# Patient Record
Sex: MALE | Race: WHITE | Employment: OTHER | URBAN - METROPOLITAN AREA
[De-identification: names, ages, dates, MRNs, and addresses within clinical notes are randomized per-mention and may not be internally consistent; named-entity substitution may affect disease eponyms.]

---

## 2018-01-01 ENCOUNTER — HOSPITAL ENCOUNTER (EMERGENCY)
Age: 73
Discharge: HOME | End: 2018-11-19
Attending: EMERGENCY MEDICINE
Payer: COMMERCIAL

## 2018-01-01 ENCOUNTER — APPOINTMENT (OUTPATIENT)
Dept: GENERAL RADIOLOGY | Age: 73
DRG: 432 | End: 2018-01-01
Attending: EMERGENCY MEDICINE
Payer: COMMERCIAL

## 2018-01-01 ENCOUNTER — APPOINTMENT (OUTPATIENT)
Dept: INTERVENTIONAL RADIOLOGY/VASCULAR | Age: 73
DRG: 432 | End: 2018-01-01
Attending: HOSPITALIST
Payer: COMMERCIAL

## 2018-01-01 ENCOUNTER — APPOINTMENT (OUTPATIENT)
Dept: ULTRASOUND IMAGING | Age: 73
DRG: 432 | End: 2018-01-01
Attending: HOSPITALIST
Payer: COMMERCIAL

## 2018-01-01 ENCOUNTER — HOSPITAL ENCOUNTER (INPATIENT)
Age: 73
LOS: 1 days | Discharge: ANOTHER HOSPITAL | DRG: 432 | End: 2018-12-29
Attending: EMERGENCY MEDICINE | Admitting: HOSPITALIST
Payer: COMMERCIAL

## 2018-01-01 ENCOUNTER — HOSPITAL ENCOUNTER (INPATIENT)
Age: 73
LOS: 3 days | Discharge: HOME | DRG: 432 | End: 2019-01-01
Attending: FAMILY MEDICINE | Admitting: INTERNAL MEDICINE
Payer: COMMERCIAL

## 2018-01-01 ENCOUNTER — APPOINTMENT (OUTPATIENT)
Dept: GENERAL RADIOLOGY | Age: 73
End: 2018-01-01
Attending: EMERGENCY MEDICINE
Payer: COMMERCIAL

## 2018-01-01 ENCOUNTER — HOSPITAL ENCOUNTER (OUTPATIENT)
Dept: LAB | Age: 73
Discharge: HOME | End: 2018-12-04
Payer: SUBSIDIZED

## 2018-01-01 VITALS
DIASTOLIC BLOOD PRESSURE: 55 MMHG | HEART RATE: 87 BPM | BODY MASS INDEX: 33.47 KG/M2 | SYSTOLIC BLOOD PRESSURE: 97 MMHG | TEMPERATURE: 98.1 F | RESPIRATION RATE: 18 BRPM | HEIGHT: 69 IN | WEIGHT: 226 LBS | OXYGEN SATURATION: 93 %

## 2018-01-01 VITALS
BODY MASS INDEX: 29.62 KG/M2 | DIASTOLIC BLOOD PRESSURE: 74 MMHG | WEIGHT: 200 LBS | TEMPERATURE: 99.1 F | OXYGEN SATURATION: 98 % | RESPIRATION RATE: 16 BRPM | HEART RATE: 62 BPM | SYSTOLIC BLOOD PRESSURE: 121 MMHG | HEIGHT: 69 IN

## 2018-01-01 DIAGNOSIS — K70.11 ASCITES DUE TO ALCOHOLIC HEPATITIS: ICD-10-CM

## 2018-01-01 DIAGNOSIS — R05.9 COUGH: ICD-10-CM

## 2018-01-01 DIAGNOSIS — R07.9 CHEST PAIN, UNSPECIFIED TYPE: Primary | ICD-10-CM

## 2018-01-01 DIAGNOSIS — R07.2 PRECORDIAL PAIN: ICD-10-CM

## 2018-01-01 DIAGNOSIS — E87.1 HYPONATREMIA: Primary | ICD-10-CM

## 2018-01-01 DIAGNOSIS — R01.1 MURMUR, CARDIAC: ICD-10-CM

## 2018-01-01 DIAGNOSIS — J18.9 PNEUMONIA OF LEFT LOWER LOBE DUE TO INFECTIOUS ORGANISM: ICD-10-CM

## 2018-01-01 DIAGNOSIS — K74.69 OTHER CIRRHOSIS OF LIVER (HCC): ICD-10-CM

## 2018-01-01 LAB
ALBUMIN SERPL-MCNC: 2.8 G/DL (ref 3.2–4.6)
ALBUMIN SERPL-MCNC: 2.8 G/DL (ref 3.2–4.6)
ALBUMIN SERPL-MCNC: 2.9 G/DL (ref 3.2–4.6)
ALBUMIN SERPL-MCNC: 3 G/DL (ref 3.2–4.6)
ALBUMIN SERPL-MCNC: 3.3 G/DL (ref 3.2–4.6)
ALBUMIN/GLOB SERPL: 0.6 {RATIO}
ALBUMIN/GLOB SERPL: 0.7 {RATIO}
ALBUMIN/GLOB SERPL: 0.8 {RATIO}
ALBUMIN/GLOB SERPL: 0.8 {RATIO}
ALBUMIN/GLOB SERPL: 0.9 {RATIO}
ALBUMIN/GLOB SERPL: 0.9 {RATIO} (ref 1.2–3.5)
ALBUMIN/GLOB SERPL: 1 {RATIO} (ref 1.2–3.5)
ALP SERPL-CCNC: 111 U/L (ref 50–136)
ALP SERPL-CCNC: 123 U/L (ref 50–136)
ALP SERPL-CCNC: 125 U/L (ref 50–136)
ALP SERPL-CCNC: 172 U/L (ref 50–136)
ALP SERPL-CCNC: 97 U/L (ref 50–136)
ALP SERPL-CCNC: 99 U/L (ref 50–136)
ALP SERPL-CCNC: 99 U/L (ref 50–136)
ALT SERPL-CCNC: 28 U/L (ref 12–65)
ALT SERPL-CCNC: 30 U/L (ref 12–65)
ALT SERPL-CCNC: 36 U/L (ref 12–65)
ALT SERPL-CCNC: 36 U/L (ref 12–65)
ALT SERPL-CCNC: 38 U/L (ref 12–65)
AMMONIA PLAS-SCNC: 17 UMOL/L (ref 24.9–68)
ANION GAP SERPL CALC-SCNC: 11 MMOL/L
ANION GAP SERPL CALC-SCNC: 11 MMOL/L
ANION GAP SERPL CALC-SCNC: 13 MMOL/L
ANION GAP SERPL CALC-SCNC: 7 MMOL/L (ref 7–16)
ANION GAP SERPL CALC-SCNC: 7 MMOL/L (ref 7–16)
ANION GAP SERPL CALC-SCNC: 8 MMOL/L
ANION GAP SERPL CALC-SCNC: 8 MMOL/L (ref 7–16)
ANION GAP SERPL CALC-SCNC: 9 MMOL/L
AST SERPL-CCNC: 31 U/L (ref 15–37)
AST SERPL-CCNC: 34 U/L (ref 15–37)
AST SERPL-CCNC: 34 U/L (ref 15–37)
AST SERPL-CCNC: 37 U/L (ref 15–37)
AST SERPL-CCNC: 48 U/L (ref 15–37)
AST SERPL-CCNC: 49 U/L (ref 15–37)
AST SERPL-CCNC: 50 U/L (ref 15–37)
ATRIAL RATE: 58 BPM
ATRIAL RATE: 72 BPM
BASOPHILS # BLD: 0 K/UL (ref 0–0.2)
BASOPHILS # BLD: 0 K/UL (ref 0–0.2)
BASOPHILS NFR BLD: 0 % (ref 0–2)
BASOPHILS NFR BLD: 0 % (ref 0–2)
BILIRUB SERPL-MCNC: 0.6 MG/DL (ref 0.2–1.1)
BILIRUB SERPL-MCNC: 0.7 MG/DL (ref 0.2–1.1)
BILIRUB SERPL-MCNC: 0.8 MG/DL (ref 0.2–1.1)
BILIRUB SERPL-MCNC: 0.9 MG/DL (ref 0.2–1.1)
BILIRUB SERPL-MCNC: 1 MG/DL (ref 0.2–1.1)
BILIRUB SERPL-MCNC: 1.2 MG/DL (ref 0.2–1.1)
BILIRUB SERPL-MCNC: 1.3 MG/DL (ref 0.2–1.1)
BNP SERPL-MCNC: 72 PG/ML
BUN SERPL-MCNC: 11 MG/DL (ref 8–23)
BUN SERPL-MCNC: 12 MG/DL (ref 8–23)
BUN SERPL-MCNC: 13 MG/DL (ref 8–23)
BUN SERPL-MCNC: 14 MG/DL (ref 8–23)
BUN SERPL-MCNC: 21 MG/DL (ref 8–23)
CALCIUM SERPL-MCNC: 7.8 MG/DL (ref 8.3–10.4)
CALCIUM SERPL-MCNC: 7.8 MG/DL (ref 8.3–10.4)
CALCIUM SERPL-MCNC: 8 MG/DL (ref 8.3–10.4)
CALCIUM SERPL-MCNC: 8.2 MG/DL (ref 8.3–10.4)
CALCIUM SERPL-MCNC: 8.5 MG/DL (ref 8.3–10.4)
CALCULATED P AXIS, ECG09: 10 DEGREES
CALCULATED P AXIS, ECG09: 70 DEGREES
CALCULATED R AXIS, ECG10: 31 DEGREES
CALCULATED R AXIS, ECG10: 9 DEGREES
CALCULATED T AXIS, ECG11: 117 DEGREES
CALCULATED T AXIS, ECG11: 28 DEGREES
CHLORIDE SERPL-SCNC: 83 MMOL/L (ref 98–107)
CHLORIDE SERPL-SCNC: 86 MMOL/L (ref 98–107)
CHLORIDE SERPL-SCNC: 87 MMOL/L (ref 98–107)
CHLORIDE SERPL-SCNC: 88 MMOL/L (ref 98–107)
CHLORIDE SERPL-SCNC: 89 MMOL/L (ref 98–107)
CHLORIDE SERPL-SCNC: 89 MMOL/L (ref 98–107)
CHLORIDE SERPL-SCNC: 92 MMOL/L (ref 98–107)
CHLORIDE SERPL-SCNC: 95 MMOL/L (ref 98–107)
CHLORIDE UR-SCNC: 86 MMOL/L
CHOLEST SERPL-MCNC: 160 MG/DL
CO2 SERPL-SCNC: 21 MMOL/L (ref 21–32)
CO2 SERPL-SCNC: 21 MMOL/L (ref 21–32)
CO2 SERPL-SCNC: 23 MMOL/L (ref 21–32)
CO2 SERPL-SCNC: 24 MMOL/L (ref 21–32)
CO2 SERPL-SCNC: 25 MMOL/L (ref 21–32)
CO2 SERPL-SCNC: 25 MMOL/L (ref 21–32)
CO2 SERPL-SCNC: 27 MMOL/L (ref 21–32)
CO2 SERPL-SCNC: 27 MMOL/L (ref 21–32)
CREAT SERPL-MCNC: 0.83 MG/DL (ref 0.8–1.5)
CREAT SERPL-MCNC: 0.87 MG/DL (ref 0.8–1.5)
CREAT SERPL-MCNC: 0.95 MG/DL (ref 0.8–1.5)
CREAT SERPL-MCNC: 0.97 MG/DL (ref 0.8–1.5)
CREAT SERPL-MCNC: 1.04 MG/DL (ref 0.8–1.5)
CREAT SERPL-MCNC: 1.08 MG/DL (ref 0.8–1.5)
CREAT SERPL-MCNC: 1.18 MG/DL (ref 0.8–1.5)
CREAT SERPL-MCNC: 1.35 MG/DL (ref 0.8–1.5)
CREAT UR-MCNC: 20 MG/DL
DIAGNOSIS, 93000: NORMAL
DIAGNOSIS, 93000: NORMAL
DIFFERENTIAL METHOD BLD: ABNORMAL
DIFFERENTIAL METHOD BLD: ABNORMAL
EOSINOPHIL # BLD: 0.1 K/UL (ref 0–0.8)
EOSINOPHIL # BLD: 0.1 K/UL (ref 0–0.8)
EOSINOPHIL #/AREA URNS HPF: NEGATIVE /[HPF]
EOSINOPHIL NFR BLD: 1 % (ref 0.5–7.8)
EOSINOPHIL NFR BLD: 2 % (ref 0.5–7.8)
ERYTHROCYTE [DISTWIDTH] IN BLOOD BY AUTOMATED COUNT: 19 % (ref 11.9–14.6)
ERYTHROCYTE [DISTWIDTH] IN BLOOD BY AUTOMATED COUNT: 19 % (ref 11.9–14.6)
ERYTHROCYTE [DISTWIDTH] IN BLOOD BY AUTOMATED COUNT: 19.2 % (ref 11.9–14.6)
ERYTHROCYTE [DISTWIDTH] IN BLOOD BY AUTOMATED COUNT: 20.1 %
GLOBULIN SER CALC-MCNC: 3.2 G/DL (ref 2.3–3.5)
GLOBULIN SER CALC-MCNC: 3.3 G/DL (ref 2.3–3.5)
GLOBULIN SER CALC-MCNC: 3.3 G/DL (ref 2.3–3.5)
GLOBULIN SER CALC-MCNC: 3.4 G/DL (ref 2.3–3.5)
GLOBULIN SER CALC-MCNC: 3.8 G/DL (ref 2.3–3.5)
GLOBULIN SER CALC-MCNC: 4.1 G/DL (ref 2.3–3.5)
GLOBULIN SER CALC-MCNC: 4.5 G/DL (ref 2.3–3.5)
GLUCOSE SERPL-MCNC: 121 MG/DL (ref 65–100)
GLUCOSE SERPL-MCNC: 131 MG/DL (ref 65–100)
GLUCOSE SERPL-MCNC: 133 MG/DL (ref 65–100)
GLUCOSE SERPL-MCNC: 133 MG/DL (ref 65–100)
GLUCOSE SERPL-MCNC: 139 MG/DL (ref 65–100)
GLUCOSE SERPL-MCNC: 173 MG/DL (ref 65–100)
GLUCOSE SERPL-MCNC: 98 MG/DL (ref 65–100)
GLUCOSE SERPL-MCNC: 99 MG/DL (ref 65–100)
HCT VFR BLD AUTO: 26 % (ref 41.1–50.3)
HCT VFR BLD AUTO: 26.4 % (ref 41.1–50.3)
HCT VFR BLD AUTO: 31.7 % (ref 41.1–50.3)
HCT VFR BLD AUTO: 32.7 % (ref 41.1–50.3)
HDLC SERPL-MCNC: 71 MG/DL (ref 40–60)
HDLC SERPL: 2.3 {RATIO}
HGB BLD-MCNC: 10.6 G/DL (ref 13.6–17.2)
HGB BLD-MCNC: 10.8 G/DL (ref 13.6–17.2)
HGB BLD-MCNC: 9.1 G/DL (ref 13.6–17.2)
HGB BLD-MCNC: 9.2 G/DL (ref 13.6–17.2)
IMM GRANULOCYTES # BLD: 0 K/UL (ref 0–0.5)
IMM GRANULOCYTES # BLD: 0.1 K/UL (ref 0–0.5)
IMM GRANULOCYTES NFR BLD AUTO: 1 % (ref 0–5)
IMM GRANULOCYTES NFR BLD AUTO: 1 % (ref 0–5)
INR PPP: 1.4
LDLC SERPL CALC-MCNC: 83.8 MG/DL
LIPID PROFILE,FLP: ABNORMAL
LYMPHOCYTES # BLD: 0.6 K/UL (ref 0.5–4.6)
LYMPHOCYTES # BLD: 1 K/UL (ref 0.5–4.6)
LYMPHOCYTES NFR BLD: 10 % (ref 13–44)
LYMPHOCYTES NFR BLD: 20 % (ref 13–44)
MCH RBC QN AUTO: 26.2 PG (ref 26.1–32.9)
MCH RBC QN AUTO: 28.6 PG (ref 26.1–32.9)
MCH RBC QN AUTO: 28.7 PG (ref 26.1–32.9)
MCH RBC QN AUTO: 29.8 PG (ref 26.1–32.9)
MCHC RBC AUTO-ENTMCNC: 32.4 G/DL (ref 31.4–35)
MCHC RBC AUTO-ENTMCNC: 34.1 G/DL (ref 31.4–35)
MCHC RBC AUTO-ENTMCNC: 34.8 G/DL (ref 31.4–35)
MCHC RBC AUTO-ENTMCNC: 35 G/DL (ref 31.4–35)
MCV RBC AUTO: 80.7 FL (ref 79.6–97.8)
MCV RBC AUTO: 82.2 FL (ref 79.6–97.8)
MCV RBC AUTO: 84.1 FL (ref 79.6–97.8)
MCV RBC AUTO: 85.2 FL (ref 79.6–97.8)
MONOCYTES # BLD: 0.9 K/UL (ref 0.1–1.3)
MONOCYTES # BLD: 1 K/UL (ref 0.1–1.3)
MONOCYTES NFR BLD: 16 % (ref 4–12)
MONOCYTES NFR BLD: 18 % (ref 4–12)
NEUTS SEG # BLD: 3 K/UL (ref 1.7–8.2)
NEUTS SEG # BLD: 4.2 K/UL (ref 1.7–8.2)
NEUTS SEG NFR BLD: 60 % (ref 43–78)
NEUTS SEG NFR BLD: 72 % (ref 43–78)
NRBC # BLD: 0 K/UL (ref 0–0.2)
OSMOLALITY UR: 238 MOSM/KG H2O
P-R INTERVAL, ECG05: 208 MS
P-R INTERVAL, ECG05: 210 MS
PLATELET # BLD AUTO: 118 K/UL (ref 150–450)
PLATELET # BLD AUTO: 119 K/UL (ref 150–450)
PLATELET # BLD AUTO: 121 K/UL (ref 150–450)
PLATELET # BLD AUTO: 144 K/UL (ref 150–450)
PMV BLD AUTO: 10.6 FL (ref 9.4–12.3)
PMV BLD AUTO: 11.1 FL (ref 9.4–12.3)
PMV BLD AUTO: 11.7 FL (ref 9.4–12.3)
PMV BLD AUTO: 12 FL (ref 9.4–12.3)
POTASSIUM SERPL-SCNC: 3.5 MMOL/L (ref 3.5–5.1)
POTASSIUM SERPL-SCNC: 3.8 MMOL/L (ref 3.5–5.1)
POTASSIUM SERPL-SCNC: 3.8 MMOL/L (ref 3.5–5.1)
POTASSIUM SERPL-SCNC: 4.1 MMOL/L (ref 3.5–5.1)
POTASSIUM SERPL-SCNC: 4.1 MMOL/L (ref 3.5–5.1)
POTASSIUM SERPL-SCNC: 4.4 MMOL/L (ref 3.5–5.1)
POTASSIUM SERPL-SCNC: 4.8 MMOL/L (ref 3.5–5.1)
POTASSIUM SERPL-SCNC: 4.9 MMOL/L (ref 3.5–5.1)
POTASSIUM UR-SCNC: 11 MMOL/L
PROCALCITONIN SERPL-MCNC: 0.1 NG/ML
PROT SERPL-MCNC: 6.2 G/DL
PROT SERPL-MCNC: 6.2 G/DL (ref 6.3–8.2)
PROT SERPL-MCNC: 6.3 G/DL
PROT SERPL-MCNC: 6.6 G/DL (ref 6.3–8.2)
PROT SERPL-MCNC: 6.8 G/DL
PROT SERPL-MCNC: 7 G/DL
PROT SERPL-MCNC: 7.3 G/DL
PROTHROMBIN TIME: 16.5 SEC (ref 11.7–14.5)
Q-T INTERVAL, ECG07: 366 MS
Q-T INTERVAL, ECG07: 418 MS
QRS DURATION, ECG06: 82 MS
QRS DURATION, ECG06: 84 MS
QTC CALCULATION (BEZET), ECG08: 400 MS
QTC CALCULATION (BEZET), ECG08: 410 MS
RBC # BLD AUTO: 3.05 M/UL (ref 4.23–5.6)
RBC # BLD AUTO: 3.21 M/UL (ref 4.23–5.6)
RBC # BLD AUTO: 3.77 M/UL (ref 4.23–5.6)
RBC # BLD AUTO: 4.05 M/UL (ref 4.23–5.6)
SODIUM SERPL-SCNC: 116 MMOL/L (ref 136–145)
SODIUM SERPL-SCNC: 118 MMOL/L (ref 136–145)
SODIUM SERPL-SCNC: 121 MMOL/L (ref 136–145)
SODIUM SERPL-SCNC: 122 MMOL/L (ref 136–145)
SODIUM SERPL-SCNC: 122 MMOL/L (ref 136–145)
SODIUM SERPL-SCNC: 126 MMOL/L (ref 136–145)
SODIUM SERPL-SCNC: 127 MMOL/L (ref 136–145)
SODIUM SERPL-SCNC: 131 MMOL/L (ref 136–145)
SODIUM UR-SCNC: 78 MMOL/L
TRIGL SERPL-MCNC: 26 MG/DL (ref 35–150)
TROPONIN I BLD-MCNC: 0 NG/ML (ref 0.02–0.05)
TROPONIN I BLD-MCNC: 0 NG/ML (ref 0.02–0.05)
TROPONIN I BLD-MCNC: 0.26 NG/ML (ref 0.02–0.05)
TROPONIN I SERPL-MCNC: <0.02 NG/ML (ref 0.02–0.05)
TROPONIN I SERPL-MCNC: <0.02 NG/ML (ref 0.02–0.05)
VENTRICULAR RATE, ECG03: 58 BPM
VENTRICULAR RATE, ECG03: 72 BPM
VLDLC SERPL CALC-MCNC: 5.2 MG/DL (ref 6–23)
WBC # BLD AUTO: 4.8 K/UL (ref 4.3–11.1)
WBC # BLD AUTO: 4.9 K/UL (ref 4.3–11.1)
WBC # BLD AUTO: 5 K/UL (ref 4.3–11.1)
WBC # BLD AUTO: 5.8 K/UL (ref 4.3–11.1)

## 2018-01-01 PROCEDURE — 85025 COMPLETE CBC W/AUTO DIFF WBC: CPT

## 2018-01-01 PROCEDURE — 97165 OT EVAL LOW COMPLEX 30 MIN: CPT

## 2018-01-01 PROCEDURE — 85027 COMPLETE CBC AUTOMATED: CPT

## 2018-01-01 PROCEDURE — 94640 AIRWAY INHALATION TREATMENT: CPT

## 2018-01-01 PROCEDURE — 97161 PT EVAL LOW COMPLEX 20 MIN: CPT

## 2018-01-01 PROCEDURE — 77030011256 HC DRSG MEPILEX <16IN NO BORD MOLN -A

## 2018-01-01 PROCEDURE — 74011000258 HC RX REV CODE- 258: Performed by: INTERNAL MEDICINE

## 2018-01-01 PROCEDURE — 82570 ASSAY OF URINE CREATININE: CPT

## 2018-01-01 PROCEDURE — 80053 COMPREHEN METABOLIC PANEL: CPT

## 2018-01-01 PROCEDURE — 71045 X-RAY EXAM CHEST 1 VIEW: CPT

## 2018-01-01 PROCEDURE — 87040 BLOOD CULTURE FOR BACTERIA: CPT

## 2018-01-01 PROCEDURE — 74011250637 HC RX REV CODE- 250/637: Performed by: HOSPITALIST

## 2018-01-01 PROCEDURE — 49083 ABD PARACENTESIS W/IMAGING: CPT

## 2018-01-01 PROCEDURE — 74011000250 HC RX REV CODE- 250: Performed by: HOSPITALIST

## 2018-01-01 PROCEDURE — 97530 THERAPEUTIC ACTIVITIES: CPT

## 2018-01-01 PROCEDURE — 85610 PROTHROMBIN TIME: CPT

## 2018-01-01 PROCEDURE — 74011000258 HC RX REV CODE- 258: Performed by: HOSPITALIST

## 2018-01-01 PROCEDURE — 74011250637 HC RX REV CODE- 250/637: Performed by: INTERNAL MEDICINE

## 2018-01-01 PROCEDURE — 77030020256 HC SOL INJ NACL 0.9%  500ML

## 2018-01-01 PROCEDURE — 82436 ASSAY OF URINE CHLORIDE: CPT

## 2018-01-01 PROCEDURE — 71046 X-RAY EXAM CHEST 2 VIEWS: CPT

## 2018-01-01 PROCEDURE — 65270000029 HC RM PRIVATE

## 2018-01-01 PROCEDURE — 76700 US EXAM ABDOM COMPLETE: CPT

## 2018-01-01 PROCEDURE — 77030037400 HC ADH TISS HI VISC EXOFIN CHMP -B

## 2018-01-01 PROCEDURE — 0W9G3ZZ DRAINAGE OF PERITONEAL CAVITY, PERCUTANEOUS APPROACH: ICD-10-PCS | Performed by: RADIOLOGY

## 2018-01-01 PROCEDURE — 74011250636 HC RX REV CODE- 250/636: Performed by: INTERNAL MEDICINE

## 2018-01-01 PROCEDURE — 77030012893

## 2018-01-01 PROCEDURE — P9047 ALBUMIN (HUMAN), 25%, 50ML: HCPCS | Performed by: PHYSICIAN ASSISTANT

## 2018-01-01 PROCEDURE — 83880 ASSAY OF NATRIURETIC PEPTIDE: CPT

## 2018-01-01 PROCEDURE — 74011250636 HC RX REV CODE- 250/636: Performed by: EMERGENCY MEDICINE

## 2018-01-01 PROCEDURE — 93005 ELECTROCARDIOGRAM TRACING: CPT | Performed by: EMERGENCY MEDICINE

## 2018-01-01 PROCEDURE — 36415 COLL VENOUS BLD VENIPUNCTURE: CPT

## 2018-01-01 PROCEDURE — 80048 BASIC METABOLIC PNL TOTAL CA: CPT

## 2018-01-01 PROCEDURE — 74011250637 HC RX REV CODE- 250/637: Performed by: PHYSICIAN ASSISTANT

## 2018-01-01 PROCEDURE — 87205 SMEAR GRAM STAIN: CPT

## 2018-01-01 PROCEDURE — 84484 ASSAY OF TROPONIN QUANT: CPT

## 2018-01-01 PROCEDURE — 74011250636 HC RX REV CODE- 250/636: Performed by: HOSPITALIST

## 2018-01-01 PROCEDURE — 82140 ASSAY OF AMMONIA: CPT

## 2018-01-01 PROCEDURE — C1729 CATH, DRAINAGE: HCPCS

## 2018-01-01 PROCEDURE — 83935 ASSAY OF URINE OSMOLALITY: CPT

## 2018-01-01 PROCEDURE — 94760 N-INVAS EAR/PLS OXIMETRY 1: CPT

## 2018-01-01 PROCEDURE — 99284 EMERGENCY DEPT VISIT MOD MDM: CPT | Performed by: EMERGENCY MEDICINE

## 2018-01-01 PROCEDURE — 74011250636 HC RX REV CODE- 250/636: Performed by: PHYSICIAN ASSISTANT

## 2018-01-01 PROCEDURE — P9047 ALBUMIN (HUMAN), 25%, 50ML: HCPCS | Performed by: HOSPITALIST

## 2018-01-01 PROCEDURE — 84295 ASSAY OF SERUM SODIUM: CPT

## 2018-01-01 PROCEDURE — 97164 PT RE-EVAL EST PLAN CARE: CPT

## 2018-01-01 PROCEDURE — 96374 THER/PROPH/DIAG INJ IV PUSH: CPT | Performed by: EMERGENCY MEDICINE

## 2018-01-01 PROCEDURE — 84133 ASSAY OF URINE POTASSIUM: CPT

## 2018-01-01 PROCEDURE — 74011000258 HC RX REV CODE- 258: Performed by: EMERGENCY MEDICINE

## 2018-01-01 PROCEDURE — 74011000302 HC RX REV CODE- 302: Performed by: HOSPITALIST

## 2018-01-01 PROCEDURE — 99285 EMERGENCY DEPT VISIT HI MDM: CPT | Performed by: EMERGENCY MEDICINE

## 2018-01-01 PROCEDURE — 97535 SELF CARE MNGMENT TRAINING: CPT

## 2018-01-01 PROCEDURE — 84300 ASSAY OF URINE SODIUM: CPT

## 2018-01-01 PROCEDURE — 86580 TB INTRADERMAL TEST: CPT | Performed by: HOSPITALIST

## 2018-01-01 PROCEDURE — 80061 LIPID PANEL: CPT

## 2018-01-01 PROCEDURE — 77030032490 HC SLV COMPR SCD KNE COVD -B

## 2018-01-01 PROCEDURE — 84145 PROCALCITONIN (PCT): CPT

## 2018-01-01 PROCEDURE — 77030034849

## 2018-01-01 RX ORDER — SODIUM CHLORIDE TAB 1 GM 1 G
1 TAB MISCELLANEOUS 4 TIMES DAILY
Status: DISCONTINUED | OUTPATIENT
Start: 2018-01-01 | End: 2018-01-01 | Stop reason: HOSPADM

## 2018-01-01 RX ORDER — ACETAMINOPHEN 325 MG/1
650 TABLET ORAL
Status: CANCELLED | OUTPATIENT
Start: 2018-01-01

## 2018-01-01 RX ORDER — SODIUM CHLORIDE 0.9 % (FLUSH) 0.9 %
5-10 SYRINGE (ML) INJECTION AS NEEDED
Status: DISCONTINUED | OUTPATIENT
Start: 2018-01-01 | End: 2018-01-01 | Stop reason: HOSPADM

## 2018-01-01 RX ORDER — NALOXONE HYDROCHLORIDE 0.4 MG/ML
0.4 INJECTION, SOLUTION INTRAMUSCULAR; INTRAVENOUS; SUBCUTANEOUS AS NEEDED
Status: DISCONTINUED | OUTPATIENT
Start: 2018-01-01 | End: 2019-01-01 | Stop reason: HOSPADM

## 2018-01-01 RX ORDER — SPIRONOLACTONE 25 MG/1
100 TABLET ORAL DAILY
Status: DISCONTINUED | OUTPATIENT
Start: 2018-01-01 | End: 2018-01-01 | Stop reason: HOSPADM

## 2018-01-01 RX ORDER — SODIUM CHLORIDE 0.9 % (FLUSH) 0.9 %
5-10 SYRINGE (ML) INJECTION AS NEEDED
Status: DISCONTINUED | OUTPATIENT
Start: 2018-01-01 | End: 2019-01-01 | Stop reason: HOSPADM

## 2018-01-01 RX ORDER — MORPHINE SULFATE 2 MG/ML
1 INJECTION, SOLUTION INTRAMUSCULAR; INTRAVENOUS
Status: CANCELLED | OUTPATIENT
Start: 2018-01-01

## 2018-01-01 RX ORDER — SPIRONOLACTONE 100 MG/1
200 TABLET, FILM COATED ORAL DAILY
Status: DISCONTINUED | OUTPATIENT
Start: 2018-01-01 | End: 2019-01-01 | Stop reason: HOSPADM

## 2018-01-01 RX ORDER — PHENAZOPYRIDINE HYDROCHLORIDE 95 MG/1
95 TABLET ORAL 3 TIMES DAILY
Status: DISCONTINUED | OUTPATIENT
Start: 2018-01-01 | End: 2018-01-01 | Stop reason: HOSPADM

## 2018-01-01 RX ORDER — LOSARTAN POTASSIUM 50 MG/1
50 TABLET ORAL DAILY
Status: CANCELLED | OUTPATIENT
Start: 2018-01-01

## 2018-01-01 RX ORDER — SODIUM CHLORIDE 0.9 % (FLUSH) 0.9 %
5-10 SYRINGE (ML) INJECTION EVERY 8 HOURS
Status: DISCONTINUED | OUTPATIENT
Start: 2018-01-01 | End: 2018-01-01 | Stop reason: HOSPADM

## 2018-01-01 RX ORDER — DOXAZOSIN 4 MG/1
4 TABLET ORAL DAILY
Status: DISCONTINUED | OUTPATIENT
Start: 2018-01-01 | End: 2018-01-01 | Stop reason: HOSPADM

## 2018-01-01 RX ORDER — SODIUM CHLORIDE 0.9 % (FLUSH) 0.9 %
5-10 SYRINGE (ML) INJECTION AS NEEDED
Status: CANCELLED | OUTPATIENT
Start: 2018-01-01

## 2018-01-01 RX ORDER — FUROSEMIDE 10 MG/ML
40 INJECTION INTRAMUSCULAR; INTRAVENOUS 2 TIMES DAILY
Status: DISCONTINUED | OUTPATIENT
Start: 2018-01-01 | End: 2018-01-01

## 2018-01-01 RX ORDER — SODIUM CHLORIDE TAB 1 GM 1 G
1 TAB MISCELLANEOUS 4 TIMES DAILY
Status: DISCONTINUED | OUTPATIENT
Start: 2018-01-01 | End: 2018-01-01

## 2018-01-01 RX ORDER — PANTOPRAZOLE SODIUM 40 MG/1
40 TABLET, DELAYED RELEASE ORAL EVERY 24 HOURS
Status: DISCONTINUED | OUTPATIENT
Start: 2018-01-01 | End: 2019-01-01 | Stop reason: HOSPADM

## 2018-01-01 RX ORDER — ONDANSETRON 2 MG/ML
4 INJECTION INTRAMUSCULAR; INTRAVENOUS
Status: DISCONTINUED | OUTPATIENT
Start: 2018-01-01 | End: 2018-01-01 | Stop reason: HOSPADM

## 2018-01-01 RX ORDER — ENOXAPARIN SODIUM 100 MG/ML
40 INJECTION SUBCUTANEOUS EVERY 24 HOURS
Status: DISCONTINUED | OUTPATIENT
Start: 2018-01-01 | End: 2019-01-01 | Stop reason: HOSPADM

## 2018-01-01 RX ORDER — ACETAMINOPHEN 325 MG/1
650 TABLET ORAL
Status: DISCONTINUED | OUTPATIENT
Start: 2018-01-01 | End: 2018-01-01 | Stop reason: HOSPADM

## 2018-01-01 RX ORDER — ALBUMIN HUMAN 250 G/1000ML
12.5 SOLUTION INTRAVENOUS EVERY 6 HOURS
Status: DISCONTINUED | OUTPATIENT
Start: 2018-01-01 | End: 2018-01-01 | Stop reason: HOSPADM

## 2018-01-01 RX ORDER — GUAIFENESIN/DEXTROMETHORPHAN 100-10MG/5
5 SYRUP ORAL
Status: CANCELLED | OUTPATIENT
Start: 2018-01-01

## 2018-01-01 RX ORDER — ALBUMIN HUMAN 250 G/1000ML
25 SOLUTION INTRAVENOUS ONCE
Status: COMPLETED | OUTPATIENT
Start: 2018-01-01 | End: 2018-01-01

## 2018-01-01 RX ORDER — GABAPENTIN 400 MG/1
400 CAPSULE ORAL 3 TIMES DAILY
Status: DISCONTINUED | OUTPATIENT
Start: 2018-01-01 | End: 2019-01-01 | Stop reason: HOSPADM

## 2018-01-01 RX ORDER — PANTOPRAZOLE SODIUM 40 MG/1
40 TABLET, DELAYED RELEASE ORAL EVERY 24 HOURS
Status: CANCELLED | OUTPATIENT
Start: 2018-01-01

## 2018-01-01 RX ORDER — NALOXONE HYDROCHLORIDE 0.4 MG/ML
0.4 INJECTION, SOLUTION INTRAMUSCULAR; INTRAVENOUS; SUBCUTANEOUS AS NEEDED
Status: DISCONTINUED | OUTPATIENT
Start: 2018-01-01 | End: 2018-01-01 | Stop reason: HOSPADM

## 2018-01-01 RX ORDER — DOXAZOSIN 4 MG/1
4 TABLET ORAL DAILY
Status: DISCONTINUED | OUTPATIENT
Start: 2018-01-01 | End: 2018-01-01

## 2018-01-01 RX ORDER — FUROSEMIDE 10 MG/ML
40 INJECTION INTRAMUSCULAR; INTRAVENOUS DAILY
Status: CANCELLED | OUTPATIENT
Start: 2018-01-01

## 2018-01-01 RX ORDER — LANOLIN ALCOHOL/MO/W.PET/CERES
27 CREAM (GRAM) TOPICAL
COMMUNITY

## 2018-01-01 RX ORDER — ALBUTEROL SULFATE 0.83 MG/ML
2.5 SOLUTION RESPIRATORY (INHALATION)
Status: CANCELLED | OUTPATIENT
Start: 2018-01-01

## 2018-01-01 RX ORDER — SODIUM CHLORIDE 0.9 % (FLUSH) 0.9 %
5-10 SYRINGE (ML) INJECTION EVERY 8 HOURS
Status: DISCONTINUED | OUTPATIENT
Start: 2018-01-01 | End: 2019-01-01 | Stop reason: HOSPADM

## 2018-01-01 RX ORDER — PHENAZOPYRIDINE HYDROCHLORIDE 95 MG/1
95 TABLET ORAL 3 TIMES DAILY
Status: DISCONTINUED | OUTPATIENT
Start: 2018-01-01 | End: 2019-01-01 | Stop reason: HOSPADM

## 2018-01-01 RX ORDER — SODIUM CHLORIDE TAB 1 GM 1 G
1 TAB MISCELLANEOUS 4 TIMES DAILY
Status: CANCELLED | OUTPATIENT
Start: 2018-01-01

## 2018-01-01 RX ORDER — POTASSIUM CHLORIDE 20 MEQ/1
40 TABLET, EXTENDED RELEASE ORAL DAILY
Status: DISCONTINUED | OUTPATIENT
Start: 2018-01-01 | End: 2019-01-01 | Stop reason: HOSPADM

## 2018-01-01 RX ORDER — FUROSEMIDE 10 MG/ML
40 INJECTION INTRAMUSCULAR; INTRAVENOUS DAILY
Status: DISCONTINUED | OUTPATIENT
Start: 2018-01-01 | End: 2018-01-01

## 2018-01-01 RX ORDER — LIDOCAINE HYDROCHLORIDE 10 MG/ML
1-20 INJECTION INFILTRATION; PERINEURAL ONCE
Status: ACTIVE | OUTPATIENT
Start: 2018-01-01 | End: 2018-01-01

## 2018-01-01 RX ORDER — PHENAZOPYRIDINE HYDROCHLORIDE 95 MG/1
95 TABLET ORAL 3 TIMES DAILY
Status: CANCELLED | OUTPATIENT
Start: 2018-01-01

## 2018-01-01 RX ORDER — ONDANSETRON 2 MG/ML
4 INJECTION INTRAMUSCULAR; INTRAVENOUS
Status: CANCELLED | OUTPATIENT
Start: 2018-01-01

## 2018-01-01 RX ORDER — MORPHINE SULFATE 2 MG/ML
1 INJECTION, SOLUTION INTRAMUSCULAR; INTRAVENOUS
Status: DISCONTINUED | OUTPATIENT
Start: 2018-01-01 | End: 2018-01-01 | Stop reason: HOSPADM

## 2018-01-01 RX ORDER — FUROSEMIDE 10 MG/ML
40 INJECTION INTRAMUSCULAR; INTRAVENOUS DAILY
Status: DISCONTINUED | OUTPATIENT
Start: 2018-01-01 | End: 2018-01-01 | Stop reason: HOSPADM

## 2018-01-01 RX ORDER — LIDOCAINE HYDROCHLORIDE 20 MG/ML
1-10 INJECTION, SOLUTION EPIDURAL; INFILTRATION; INTRACAUDAL; PERINEURAL ONCE
Status: COMPLETED | OUTPATIENT
Start: 2018-01-01 | End: 2018-01-01

## 2018-01-01 RX ORDER — FUROSEMIDE 40 MG/1
40 TABLET ORAL DAILY
Status: CANCELLED | OUTPATIENT
Start: 2018-01-01

## 2018-01-01 RX ORDER — FUROSEMIDE 40 MG/1
40 TABLET ORAL DAILY
Status: DISCONTINUED | OUTPATIENT
Start: 2018-01-01 | End: 2018-01-01

## 2018-01-01 RX ORDER — GABAPENTIN 400 MG/1
400 CAPSULE ORAL 3 TIMES DAILY
Status: CANCELLED | OUTPATIENT
Start: 2018-01-01

## 2018-01-01 RX ORDER — ALBUTEROL SULFATE 0.83 MG/ML
2.5 SOLUTION RESPIRATORY (INHALATION)
Status: DISCONTINUED | OUTPATIENT
Start: 2018-01-01 | End: 2019-01-01 | Stop reason: HOSPADM

## 2018-01-01 RX ORDER — FUROSEMIDE 10 MG/ML
40 INJECTION INTRAMUSCULAR; INTRAVENOUS ONCE
Status: COMPLETED | OUTPATIENT
Start: 2018-01-01 | End: 2018-01-01

## 2018-01-01 RX ORDER — MORPHINE SULFATE 2 MG/ML
1 INJECTION, SOLUTION INTRAMUSCULAR; INTRAVENOUS
Status: DISCONTINUED | OUTPATIENT
Start: 2018-01-01 | End: 2019-01-01 | Stop reason: HOSPADM

## 2018-01-01 RX ORDER — LEVALBUTEROL INHALATION SOLUTION 0.63 MG/3ML
0.63 SOLUTION RESPIRATORY (INHALATION)
Status: DISCONTINUED | OUTPATIENT
Start: 2018-01-01 | End: 2018-01-01 | Stop reason: CLARIF

## 2018-01-01 RX ORDER — DOXAZOSIN 4 MG/1
2 TABLET ORAL DAILY
Status: DISCONTINUED | OUTPATIENT
Start: 2018-01-01 | End: 2019-01-01 | Stop reason: HOSPADM

## 2018-01-01 RX ORDER — ALBUMIN HUMAN 250 G/1000ML
12.5 SOLUTION INTRAVENOUS EVERY 6 HOURS
Status: CANCELLED | OUTPATIENT
Start: 2018-01-01 | End: 2018-01-01

## 2018-01-01 RX ORDER — ALBUMIN HUMAN 250 G/1000ML
12.5 SOLUTION INTRAVENOUS EVERY 6 HOURS
Status: DISCONTINUED | OUTPATIENT
Start: 2018-01-01 | End: 2018-01-01

## 2018-01-01 RX ORDER — FUROSEMIDE 10 MG/ML
40 INJECTION INTRAMUSCULAR; INTRAVENOUS
Status: COMPLETED | OUTPATIENT
Start: 2018-01-01 | End: 2018-01-01

## 2018-01-01 RX ORDER — ACETAMINOPHEN 325 MG/1
650 TABLET ORAL
Status: DISCONTINUED | OUTPATIENT
Start: 2018-01-01 | End: 2019-01-01 | Stop reason: HOSPADM

## 2018-01-01 RX ORDER — NALOXONE HYDROCHLORIDE 0.4 MG/ML
0.4 INJECTION, SOLUTION INTRAMUSCULAR; INTRAVENOUS; SUBCUTANEOUS AS NEEDED
Status: CANCELLED | OUTPATIENT
Start: 2018-01-01

## 2018-01-01 RX ORDER — GUAIFENESIN/DEXTROMETHORPHAN 100-10MG/5
5 SYRUP ORAL
Status: DISCONTINUED | OUTPATIENT
Start: 2018-01-01 | End: 2019-01-01 | Stop reason: HOSPADM

## 2018-01-01 RX ORDER — SODIUM CHLORIDE 0.9 % (FLUSH) 0.9 %
5-10 SYRINGE (ML) INJECTION EVERY 8 HOURS
Status: CANCELLED | OUTPATIENT
Start: 2018-01-01

## 2018-01-01 RX ORDER — SPIRONOLACTONE 25 MG/1
100 TABLET ORAL DAILY
Status: CANCELLED | OUTPATIENT
Start: 2018-01-01

## 2018-01-01 RX ORDER — GUAIFENESIN/DEXTROMETHORPHAN 100-10MG/5
5 SYRUP ORAL
Status: DISCONTINUED | OUTPATIENT
Start: 2018-01-01 | End: 2018-01-01 | Stop reason: HOSPADM

## 2018-01-01 RX ORDER — GABAPENTIN 400 MG/1
400 CAPSULE ORAL 3 TIMES DAILY
Status: DISCONTINUED | OUTPATIENT
Start: 2018-01-01 | End: 2018-01-01 | Stop reason: HOSPADM

## 2018-01-01 RX ORDER — SPIRONOLACTONE 25 MG/1
100 TABLET ORAL DAILY
Status: DISCONTINUED | OUTPATIENT
Start: 2018-01-01 | End: 2018-01-01

## 2018-01-01 RX ORDER — DOXAZOSIN 4 MG/1
4 TABLET ORAL DAILY
Status: CANCELLED | OUTPATIENT
Start: 2018-01-01

## 2018-01-01 RX ORDER — ONDANSETRON 2 MG/ML
4 INJECTION INTRAMUSCULAR; INTRAVENOUS
Status: DISCONTINUED | OUTPATIENT
Start: 2018-01-01 | End: 2019-01-01 | Stop reason: HOSPADM

## 2018-01-01 RX ORDER — ALBUTEROL SULFATE 0.83 MG/ML
2.5 SOLUTION RESPIRATORY (INHALATION)
Status: DISCONTINUED | OUTPATIENT
Start: 2018-01-01 | End: 2018-01-01 | Stop reason: HOSPADM

## 2018-01-01 RX ORDER — PANTOPRAZOLE SODIUM 40 MG/1
40 TABLET, DELAYED RELEASE ORAL EVERY 24 HOURS
Status: DISCONTINUED | OUTPATIENT
Start: 2018-01-01 | End: 2018-01-01 | Stop reason: HOSPADM

## 2018-01-01 RX ADMIN — Medication 10 ML: at 20:46

## 2018-01-01 RX ADMIN — ALBUTEROL SULFATE 2.5 MG: 2.5 SOLUTION RESPIRATORY (INHALATION) at 13:57

## 2018-01-01 RX ADMIN — ALBUTEROL SULFATE 2.5 MG: 2.5 SOLUTION RESPIRATORY (INHALATION) at 14:32

## 2018-01-01 RX ADMIN — LACTULOSE 30 G: 20 SOLUTION ORAL at 18:19

## 2018-01-01 RX ADMIN — FUROSEMIDE 40 MG: 10 INJECTION, SOLUTION INTRAMUSCULAR; INTRAVENOUS at 09:02

## 2018-01-01 RX ADMIN — CEFTRIAXONE SODIUM 2 G: 2 INJECTION, POWDER, FOR SOLUTION INTRAMUSCULAR; INTRAVENOUS at 16:47

## 2018-01-01 RX ADMIN — URINARY PAIN RELIEF 95 MG: 95 TABLET ORAL at 09:49

## 2018-01-01 RX ADMIN — SODIUM CHLORIDE TAB 1 GM 1 G: 1 TAB at 00:46

## 2018-01-01 RX ADMIN — LIDOCAINE HYDROCHLORIDE 8 ML: 20 INJECTION, SOLUTION EPIDURAL; INFILTRATION; INTRACAUDAL; PERINEURAL at 11:32

## 2018-01-01 RX ADMIN — SPIRONOLACTONE 200 MG: 100 TABLET, FILM COATED ORAL at 09:50

## 2018-01-01 RX ADMIN — URINARY PAIN RELIEF 95 MG: 95 TABLET ORAL at 18:37

## 2018-01-01 RX ADMIN — TUBERCULIN PURIFIED PROTEIN DERIVATIVE 5 UNITS: 5 INJECTION, SOLUTION INTRADERMAL at 20:30

## 2018-01-01 RX ADMIN — FUROSEMIDE 10 MG/HR: 10 INJECTION, SOLUTION INTRAMUSCULAR; INTRAVENOUS at 07:55

## 2018-01-01 RX ADMIN — SPIRONOLACTONE 100 MG: 25 TABLET ORAL at 09:48

## 2018-01-01 RX ADMIN — CEFTRIAXONE SODIUM 2 G: 2 INJECTION, POWDER, FOR SOLUTION INTRAMUSCULAR; INTRAVENOUS at 15:38

## 2018-01-01 RX ADMIN — GABAPENTIN 400 MG: 400 CAPSULE ORAL at 21:51

## 2018-01-01 RX ADMIN — VANCOMYCIN HYDROCHLORIDE 2500 MG: 10 INJECTION, POWDER, LYOPHILIZED, FOR SOLUTION INTRAVENOUS at 14:59

## 2018-01-01 RX ADMIN — SODIUM CHLORIDE TAB 1 GM 1 G: 1 TAB at 20:45

## 2018-01-01 RX ADMIN — SODIUM CHLORIDE TAB 1 GM 1 G: 1 TAB at 09:02

## 2018-01-01 RX ADMIN — Medication 10 ML: at 05:22

## 2018-01-01 RX ADMIN — Medication 10 ML: at 06:38

## 2018-01-01 RX ADMIN — CEFTRIAXONE SODIUM 2 G: 2 INJECTION, POWDER, FOR SOLUTION INTRAMUSCULAR; INTRAVENOUS at 18:35

## 2018-01-01 RX ADMIN — ALBUTEROL SULFATE 2.5 MG: 2.5 SOLUTION RESPIRATORY (INHALATION) at 20:46

## 2018-01-01 RX ADMIN — SODIUM CHLORIDE TAB 1 GM 1 G: 1 TAB at 09:01

## 2018-01-01 RX ADMIN — Medication 10 ML: at 16:47

## 2018-01-01 RX ADMIN — DOXAZOSIN 4 MG: 4 TABLET ORAL at 09:02

## 2018-01-01 RX ADMIN — FUROSEMIDE 40 MG: 10 INJECTION, SOLUTION INTRAMUSCULAR; INTRAVENOUS at 20:27

## 2018-01-01 RX ADMIN — ALBUMIN (HUMAN) 12.5 G: 0.25 INJECTION, SOLUTION INTRAVENOUS at 05:53

## 2018-01-01 RX ADMIN — GABAPENTIN 400 MG: 400 CAPSULE ORAL at 18:20

## 2018-01-01 RX ADMIN — GABAPENTIN 400 MG: 400 CAPSULE ORAL at 18:36

## 2018-01-01 RX ADMIN — PANTOPRAZOLE SODIUM 40 MG: 40 TABLET, DELAYED RELEASE ORAL at 05:57

## 2018-01-01 RX ADMIN — ALBUMIN (HUMAN) 12.5 G: 0.25 INJECTION, SOLUTION INTRAVENOUS at 20:21

## 2018-01-01 RX ADMIN — DOXAZOSIN 2 MG: 4 TABLET ORAL at 09:50

## 2018-01-01 RX ADMIN — PANTOPRAZOLE SODIUM 40 MG: 40 TABLET, DELAYED RELEASE ORAL at 06:38

## 2018-01-01 RX ADMIN — SODIUM CHLORIDE TAB 1 GM 1 G: 1 TAB at 21:48

## 2018-01-01 RX ADMIN — URINARY PAIN RELIEF 95 MG: 95 TABLET ORAL at 16:36

## 2018-01-01 RX ADMIN — ALBUMIN (HUMAN) 12.5 G: 0.25 INJECTION, SOLUTION INTRAVENOUS at 09:03

## 2018-01-01 RX ADMIN — URINARY PAIN RELIEF 95 MG: 95 TABLET ORAL at 21:47

## 2018-01-01 RX ADMIN — ALBUMIN (HUMAN) 12.5 G: 0.25 INJECTION, SOLUTION INTRAVENOUS at 01:58

## 2018-01-01 RX ADMIN — ONDANSETRON 4 MG: 2 INJECTION INTRAMUSCULAR; INTRAVENOUS at 20:30

## 2018-01-01 RX ADMIN — Medication 10 ML: at 22:42

## 2018-01-01 RX ADMIN — Medication 10 ML: at 21:53

## 2018-01-01 RX ADMIN — GABAPENTIN 400 MG: 400 CAPSULE ORAL at 09:02

## 2018-01-01 RX ADMIN — LACTULOSE 30 G: 20 SOLUTION ORAL at 21:52

## 2018-01-01 RX ADMIN — ALBUTEROL SULFATE 2.5 MG: 2.5 SOLUTION RESPIRATORY (INHALATION) at 07:26

## 2018-01-01 RX ADMIN — AZITHROMYCIN MONOHYDRATE 500 MG: 500 INJECTION, POWDER, LYOPHILIZED, FOR SOLUTION INTRAVENOUS at 18:34

## 2018-01-01 RX ADMIN — Medication 5 ML: at 05:57

## 2018-01-01 RX ADMIN — FUROSEMIDE 40 MG: 10 INJECTION, SOLUTION INTRAMUSCULAR; INTRAVENOUS at 14:00

## 2018-01-01 RX ADMIN — LACTULOSE 30 G: 20 SOLUTION ORAL at 09:49

## 2018-01-01 RX ADMIN — SODIUM CHLORIDE 1000 ML: 900 INJECTION, SOLUTION INTRAVENOUS at 14:03

## 2018-01-01 RX ADMIN — FUROSEMIDE 10 MG/HR: 10 INJECTION, SOLUTION INTRAMUSCULAR; INTRAVENOUS at 13:47

## 2018-01-01 RX ADMIN — ALBUMIN (HUMAN) 12.5 G: 0.25 INJECTION, SOLUTION INTRAVENOUS at 09:08

## 2018-01-01 RX ADMIN — ALBUTEROL SULFATE 2.5 MG: 2.5 SOLUTION RESPIRATORY (INHALATION) at 07:13

## 2018-01-01 RX ADMIN — ALBUTEROL SULFATE 2.5 MG: 2.5 SOLUTION RESPIRATORY (INHALATION) at 19:52

## 2018-01-01 RX ADMIN — GABAPENTIN 400 MG: 400 CAPSULE ORAL at 21:48

## 2018-01-01 RX ADMIN — SODIUM CHLORIDE TAB 1 GM 1 G: 1 TAB at 13:04

## 2018-01-01 RX ADMIN — SODIUM CHLORIDE TAB 1 GM 1 G: 1 TAB at 09:49

## 2018-01-01 RX ADMIN — ALBUTEROL SULFATE 2.5 MG: 2.5 SOLUTION RESPIRATORY (INHALATION) at 02:21

## 2018-01-01 RX ADMIN — PANTOPRAZOLE SODIUM 40 MG: 40 TABLET, DELAYED RELEASE ORAL at 05:22

## 2018-01-01 RX ADMIN — ALBUTEROL SULFATE 2.5 MG: 2.5 SOLUTION RESPIRATORY (INHALATION) at 07:41

## 2018-01-01 RX ADMIN — POTASSIUM CHLORIDE 40 MEQ: 20 TABLET, EXTENDED RELEASE ORAL at 13:05

## 2018-01-01 RX ADMIN — ALBUTEROL SULFATE 2.5 MG: 2.5 SOLUTION RESPIRATORY (INHALATION) at 20:48

## 2018-01-01 RX ADMIN — LEVALBUTEROL HYDROCHLORIDE 0.63 MG: 0.63 SOLUTION RESPIRATORY (INHALATION) at 14:39

## 2018-01-01 RX ADMIN — GABAPENTIN 400 MG: 400 CAPSULE ORAL at 15:42

## 2018-01-01 RX ADMIN — DOXAZOSIN 4 MG: 4 TABLET ORAL at 09:01

## 2018-01-01 RX ADMIN — ALBUMIN (HUMAN) 12.5 G: 0.25 INJECTION, SOLUTION INTRAVENOUS at 20:03

## 2018-01-01 RX ADMIN — Medication 10 ML: at 13:04

## 2018-01-01 RX ADMIN — Medication 5 ML: at 14:00

## 2018-01-01 RX ADMIN — LACTULOSE 30 G: 20 SOLUTION ORAL at 18:34

## 2018-01-01 RX ADMIN — Medication 5 ML: at 21:49

## 2018-01-01 RX ADMIN — SPIRONOLACTONE 100 MG: 25 TABLET ORAL at 09:00

## 2018-01-01 RX ADMIN — SODIUM CHLORIDE TAB 1 GM 1 G: 1 TAB at 20:20

## 2018-01-01 RX ADMIN — PIPERACILLIN SODIUM,TAZOBACTAM SODIUM 4.5 G: 4; .5 INJECTION, POWDER, FOR SOLUTION INTRAVENOUS at 14:10

## 2018-01-01 RX ADMIN — URINARY PAIN RELIEF 95 MG: 95 TABLET ORAL at 20:46

## 2018-01-01 RX ADMIN — GABAPENTIN 400 MG: 400 CAPSULE ORAL at 09:50

## 2018-01-01 RX ADMIN — SODIUM CHLORIDE TAB 1 GM 1 G: 1 TAB at 13:42

## 2018-01-01 RX ADMIN — URINARY PAIN RELIEF 95 MG: 95 TABLET ORAL at 15:42

## 2018-01-01 RX ADMIN — ALBUTEROL SULFATE 2.5 MG: 2.5 SOLUTION RESPIRATORY (INHALATION) at 14:47

## 2018-01-01 RX ADMIN — URINARY PAIN RELIEF 95 MG: 95 TABLET ORAL at 09:00

## 2018-01-01 RX ADMIN — FUROSEMIDE 40 MG: 10 INJECTION, SOLUTION INTRAMUSCULAR; INTRAVENOUS at 09:04

## 2018-01-01 RX ADMIN — GABAPENTIN 400 MG: 400 CAPSULE ORAL at 16:35

## 2018-01-01 RX ADMIN — ALBUMIN (HUMAN) 12.5 G: 0.25 INJECTION, SOLUTION INTRAVENOUS at 13:42

## 2018-01-01 RX ADMIN — GABAPENTIN 400 MG: 400 CAPSULE ORAL at 09:01

## 2018-01-01 RX ADMIN — ALBUTEROL SULFATE 2.5 MG: 2.5 SOLUTION RESPIRATORY (INHALATION) at 22:31

## 2018-01-01 RX ADMIN — SODIUM CHLORIDE TAB 1 GM 1 G: 1 TAB at 18:36

## 2018-01-01 RX ADMIN — ALBUTEROL SULFATE 2.5 MG: 2.5 SOLUTION RESPIRATORY (INHALATION) at 01:51

## 2018-01-01 RX ADMIN — AZITHROMYCIN MONOHYDRATE 500 MG: 500 INJECTION, POWDER, LYOPHILIZED, FOR SOLUTION INTRAVENOUS at 16:44

## 2018-01-01 RX ADMIN — GABAPENTIN 400 MG: 400 CAPSULE ORAL at 20:46

## 2018-01-01 RX ADMIN — ENOXAPARIN SODIUM 40 MG: 40 INJECTION SUBCUTANEOUS at 13:03

## 2018-01-01 RX ADMIN — GABAPENTIN 400 MG: 400 CAPSULE ORAL at 21:49

## 2018-01-01 RX ADMIN — SODIUM CHLORIDE TAB 1 GM 1 G: 1 TAB at 18:20

## 2018-01-01 RX ADMIN — ALBUMIN (HUMAN) 25 G: 0.25 INJECTION, SOLUTION INTRAVENOUS at 14:29

## 2018-01-01 RX ADMIN — URINARY PAIN RELIEF 95 MG: 95 TABLET ORAL at 21:52

## 2018-01-01 RX ADMIN — LACTULOSE 30 G: 20 SOLUTION ORAL at 21:49

## 2018-01-01 RX ADMIN — LACTULOSE 30 G: 20 SOLUTION ORAL at 09:01

## 2018-09-14 ENCOUNTER — HOSPITAL ENCOUNTER (EMERGENCY)
Age: 73
Discharge: HOME | End: 2018-09-14
Attending: EMERGENCY MEDICINE
Payer: MEDICARE

## 2018-09-14 ENCOUNTER — APPOINTMENT (OUTPATIENT)
Dept: GENERAL RADIOLOGY | Age: 73
End: 2018-09-14
Attending: EMERGENCY MEDICINE
Payer: MEDICARE

## 2018-09-14 VITALS
HEART RATE: 66 BPM | BODY MASS INDEX: 34.07 KG/M2 | TEMPERATURE: 97.8 F | HEIGHT: 69 IN | DIASTOLIC BLOOD PRESSURE: 100 MMHG | WEIGHT: 230 LBS | RESPIRATION RATE: 18 BRPM | SYSTOLIC BLOOD PRESSURE: 190 MMHG | OXYGEN SATURATION: 98 %

## 2018-09-14 DIAGNOSIS — J18.9 PNEUMONIA OF LEFT LOWER LOBE DUE TO INFECTIOUS ORGANISM: Primary | ICD-10-CM

## 2018-09-14 DIAGNOSIS — K70.31 ALCOHOLIC CIRRHOSIS OF LIVER WITH ASCITES (HCC): ICD-10-CM

## 2018-09-14 LAB
ALBUMIN SERPL-MCNC: 3 G/DL (ref 3.2–4.6)
ALBUMIN/GLOB SERPL: 0.7 {RATIO}
ALP SERPL-CCNC: 163 U/L (ref 50–136)
ALT SERPL-CCNC: 40 U/L (ref 12–65)
ANION GAP SERPL CALC-SCNC: 8 MMOL/L
APTT PPP: 37.5 SEC (ref 23.2–35.3)
AST SERPL-CCNC: 52 U/L (ref 15–37)
BASOPHILS # BLD: 0.1 K/UL (ref 0–0.2)
BASOPHILS NFR BLD: 1 % (ref 0–2)
BILIRUB SERPL-MCNC: 0.8 MG/DL (ref 0.2–1.1)
BUN SERPL-MCNC: 8 MG/DL (ref 8–23)
CALCIUM SERPL-MCNC: 8.1 MG/DL (ref 8.3–10.4)
CHLORIDE SERPL-SCNC: 96 MMOL/L (ref 98–107)
CO2 SERPL-SCNC: 28 MMOL/L (ref 21–32)
CREAT SERPL-MCNC: 1.04 MG/DL (ref 0.8–1.5)
DIFFERENTIAL METHOD BLD: ABNORMAL
EOSINOPHIL # BLD: 0.2 K/UL (ref 0–0.8)
EOSINOPHIL NFR BLD: 3 % (ref 0.5–7.8)
ERYTHROCYTE [DISTWIDTH] IN BLOOD BY AUTOMATED COUNT: 20.6 %
GLOBULIN SER CALC-MCNC: 4.3 G/DL (ref 2.3–3.5)
GLUCOSE SERPL-MCNC: 97 MG/DL (ref 65–100)
HCT VFR BLD AUTO: 33.2 % (ref 41.1–50.3)
HGB BLD-MCNC: 10.1 G/DL (ref 13.6–17.2)
IMM GRANULOCYTES # BLD: 0 K/UL (ref 0–0.5)
IMM GRANULOCYTES NFR BLD AUTO: 0 % (ref 0–5)
INR PPP: 1.2
LACTATE BLD-SCNC: 0.7 MMOL/L (ref 0.5–1.9)
LYMPHOCYTES # BLD: 1.3 K/UL (ref 0.5–4.6)
LYMPHOCYTES NFR BLD: 20 % (ref 13–44)
MCH RBC QN AUTO: 23.7 PG (ref 26.1–32.9)
MCHC RBC AUTO-ENTMCNC: 30.4 G/DL (ref 31.4–35)
MCV RBC AUTO: 77.9 FL (ref 79.6–97.8)
MONOCYTES # BLD: 0.9 K/UL (ref 0.1–1.3)
MONOCYTES NFR BLD: 14 % (ref 4–12)
NEUTS SEG # BLD: 3.9 K/UL (ref 1.7–8.2)
NEUTS SEG NFR BLD: 62 % (ref 43–78)
NRBC # BLD: 0 K/UL (ref 0–0.2)
PLATELET # BLD AUTO: 120 K/UL (ref 150–450)
PMV BLD AUTO: ABNORMAL FL (ref 9.4–12.3)
POTASSIUM SERPL-SCNC: 4.4 MMOL/L (ref 3.5–5.1)
PROT SERPL-MCNC: 7.3 G/DL
PROTHROMBIN TIME: 15 SEC (ref 11.5–14.5)
RBC # BLD AUTO: 4.26 M/UL (ref 4.23–5.6)
SODIUM SERPL-SCNC: 132 MMOL/L (ref 136–145)
WBC # BLD AUTO: 6.4 K/UL (ref 4.3–11.1)

## 2018-09-14 PROCEDURE — 71046 X-RAY EXAM CHEST 2 VIEWS: CPT

## 2018-09-14 PROCEDURE — 80053 COMPREHEN METABOLIC PANEL: CPT

## 2018-09-14 PROCEDURE — 83605 ASSAY OF LACTIC ACID: CPT

## 2018-09-14 PROCEDURE — 85610 PROTHROMBIN TIME: CPT

## 2018-09-14 PROCEDURE — 74011250637 HC RX REV CODE- 250/637: Performed by: EMERGENCY MEDICINE

## 2018-09-14 PROCEDURE — 85025 COMPLETE CBC W/AUTO DIFF WBC: CPT

## 2018-09-14 PROCEDURE — 85730 THROMBOPLASTIN TIME PARTIAL: CPT

## 2018-09-14 PROCEDURE — 99284 EMERGENCY DEPT VISIT MOD MDM: CPT | Performed by: EMERGENCY MEDICINE

## 2018-09-14 RX ORDER — AMOXICILLIN AND CLAVULANATE POTASSIUM 875; 125 MG/1; MG/1
1 TABLET, FILM COATED ORAL 2 TIMES DAILY
Qty: 14 TAB | Refills: 0 | Status: SHIPPED | OUTPATIENT
Start: 2018-09-14 | End: 2018-09-21

## 2018-09-14 RX ORDER — BENZONATATE 100 MG/1
100-200 CAPSULE ORAL
Qty: 14 CAP | Refills: 0 | Status: SHIPPED | OUTPATIENT
Start: 2018-09-14 | End: 2018-09-21

## 2018-09-14 RX ORDER — GABAPENTIN 400 MG/1
400 CAPSULE ORAL 3 TIMES DAILY
COMMUNITY
End: 2018-01-01 | Stop reason: SDUPTHER

## 2018-09-14 RX ORDER — AMOXICILLIN AND CLAVULANATE POTASSIUM 875; 125 MG/1; MG/1
1 TABLET, FILM COATED ORAL
Status: COMPLETED | OUTPATIENT
Start: 2018-09-14 | End: 2018-09-14

## 2018-09-14 RX ADMIN — AMOXICILLIN AND CLAVULANATE POTASSIUM 1 TABLET: 875; 125 TABLET, FILM COATED ORAL at 21:58

## 2018-09-14 NOTE — ED TRIAGE NOTES
Pt is complaining of his bronchitis not getting any better. He was diagnosed with bronchitis 2 months. States he has had it for the last few years off and on.

## 2018-09-15 NOTE — ED PROVIDER NOTES
HPI Comments: 74-year-old gentleman presents with concerns about a cough with some chest congestion he says feels like his bronchitis. Patient says he went to an urgent care because of that and then was sent here for further evaluation. Patient notes that in early July he was diagnosed with liver cirrhosis from  Chronic alcohol use. He says at that time he stopped drinking on call and he ended up getting a paracentesis. He had his second paracentesis about 2 weeks ago. All that happened in [place] and the patient just moved up here about a week ago. He says he has not been taking any of his usual home medications because he did not feel well taking them. He denies any recent fevers or chills and says his cough has been nonproductive. Elements of this note were created using speech recognition software. As such, errors of speech recognition may be present. Patient is a 72 y.o. male presenting with cough and wheezing. The history is provided by the patient. Cough Associated symptoms include wheezing. Pertinent negatives include no chest pain, no chills, no eye redness, no headaches, no rhinorrhea, no sore throat, no myalgias, no shortness of breath, no nausea, no vomiting and no confusion. Wheezing Associated symptoms include cough. Pertinent negatives include no chest pain, no fever, no abdominal pain, no vomiting, no diarrhea, no dysuria, no headaches, no rhinorrhea, no sore throat and no rash. Past Medical History:  
Diagnosis Date  Bronchitis  Cirrhosis of liver with ascites ([address]  Hypertension  Ulcer of ileum   
 ulcer Past Surgical History:  
Procedure Laterality Date  HX APPENDECTOMY  HX ORTHOPAEDIC    
 carpal tunnel  HX ORTHOPAEDIC    
 back surgery History reviewed. No pertinent family history. Social History Social History  Marital status:    Spouse name: N/A  
 Number of children: N/A  
 Years of education: N/A  
 Occupational History  Not on file. Social History Main Topics  Smoking status: Never Smoker  Smokeless tobacco: Never Used  Alcohol use No  
 Drug use: No  
 Sexual activity: Not on file Other Topics Concern  Not on file Social History Narrative  No narrative on file ALLERGIES: Review of patient's allergies indicates no known allergies. Review of Systems Constitutional: Negative for chills, diaphoresis and fever. HENT: Negative for congestion, rhinorrhea and sore throat. Eyes: Negative for redness and visual disturbance. Respiratory: Positive for cough and wheezing. Negative for chest tightness and shortness of breath. Cardiovascular: Negative for chest pain and palpitations. Gastrointestinal: Positive for abdominal distention. Negative for abdominal pain, blood in stool, diarrhea, nausea and vomiting. Endocrine: Negative for polydipsia and polyuria. Genitourinary: Negative for dysuria and hematuria. Musculoskeletal: Negative for arthralgias, myalgias and neck stiffness. Skin: Negative for rash. Allergic/Immunologic: Negative for environmental allergies and food allergies. Neurological: Negative for dizziness, weakness and headaches. Hematological: Negative for adenopathy. Does not bruise/bleed easily. Psychiatric/Behavioral: Negative for confusion and sleep disturbance. The patient is not nervous/anxious. Vitals:  
 09/14/18 1938 Pulse: 70 Resp: 18 Temp: 97.8 °F (36.6 °C) SpO2: 97% Weight: 104.3 kg (230 lb) Height: 5' 9\" (1.753 m) Physical Exam  
Constitutional: He is oriented to person, place, and time. He appears well-developed and well-nourished. HENT:  
Head: Normocephalic and atraumatic. Eyes: Conjunctivae and EOM are normal. Pupils are equal, round, and reactive to light. Neck: Normal range of motion. Cardiovascular: Normal rate and regular rhythm. Pulmonary/Chest: Effort normal and breath sounds normal. No respiratory distress. He has no wheezes. He has no rales. He exhibits no tenderness. Abdominal: Soft. Bowel sounds are normal. There is no rebound and no guarding. Patient has ascites Musculoskeletal: Normal range of motion. He exhibits no edema or tenderness. Lymphadenopathy:  
  He has no cervical adenopathy. Neurological: He is alert and oriented to person, place, and time. Skin: Skin is warm and dry. Psychiatric: He has a normal mood and affect. Nursing note and vitals reviewed. MDM Number of Diagnoses or Management Options Diagnosis management comments: X-rays positive for a left lower lobe infiltrate. His blood work is essentially unremarkable. I will give him a dose of some antibodies in the ER and I will call [facility] to try to arrange some follow-up. He does have an appointment with a primary care doctor in about 2 weeks. I discussed the case with Dr. Camryn Bowman who kindly agreed to follow up with the patient in the office. ED Course Procedures

## 2018-09-15 NOTE — ED NOTES
I have reviewed discharge instructions with the patient. The patient verbalized understanding. Patient left ED via Discharge Method: ambulatory to Home with family. Opportunity for questions and clarification provided. Patient given 2 scripts. To continue your aftercare when you leave the hospital, you may receive an automated call from our care team to check in on how you are doing. This is a free service and part of our promise to provide the best care and service to meet your aftercare needs.  If you have questions, or wish to unsubscribe from this service please call [phone]. Thank you for Choosing our [facility] Emergency Department.

## 2018-09-15 NOTE — DISCHARGE INSTRUCTIONS
Return with any fevers, vomiting, difficulty breathing, worsening symptoms, or additional concerns. Resume your previously prescribed medications    Follow up with gastroenterology for further evaluation.

## 2018-10-08 PROBLEM — J18.9 PNEUMONIA OF LEFT LOWER LOBE DUE TO INFECTIOUS ORGANISM: Status: ACTIVE | Noted: 2018-01-01

## 2018-10-08 PROBLEM — K70.31 ALCOHOLIC CIRRHOSIS OF LIVER WITH ASCITES (HCC): Status: ACTIVE | Noted: 2018-01-01

## 2018-10-08 PROBLEM — I85.00 ESOPHAGEAL VARICES WITHOUT BLEEDING (HCC): Status: ACTIVE | Noted: 2018-01-01

## 2018-10-30 PROBLEM — I10 ESSENTIAL HYPERTENSION: Status: ACTIVE | Noted: 2018-01-01

## 2018-11-19 NOTE — ED NOTES
I have reviewed discharge instructions with the patient, spouse and caregiver. The patient, spouse and caregiver verbalized understanding. Patient left ED via Discharge Method: ambulatory to Home with (insert name of family/friend, self, transport wife). Opportunity for questions and clarification provided. Patient given 0 scripts. To continue your aftercare when you leave the hospital, you may receive an automated call from our care team to check in on how you are doing. This is a free service and part of our promise to provide the best care and service to meet your aftercare needs.  If you have questions, or wish to unsubscribe from this service please call [phone]. Thank you for Choosing our [facility] Emergency Department.

## 2018-11-19 NOTE — ED PROVIDER NOTES
Patient states that he started having chest pain around 2 hours ago. He states it started when he went to lay down to go to sleep. He describes it as a sharp left-sided pain that gets up to 8/10 at times. The pain comes and goes worse when he lies down and better when he sits up. The pain occasionally radiates to his left upper arm. He denies any assisted shortness of breath or nausea or diaphoresis. He denies similar pain in the past.  He was seen here approximately 2 months ago for pneumonia. He states he has had a cough for the past year. He continues to have a dry cough. He has a significant past medical history of cirrhosis with significant ascites for which he sees gastroenterology. Elements of this note were created using speech recognition software. As such, errors of speech recognition may be present. Past Medical History:  
Diagnosis Date  Bronchitis  [address]osis of liver with ascites ([facility] [address]  Hypertension  Ulcer of ileum   
 ulcer Past Surgical History:  
Procedure Laterality Date  HX APPENDECTOMY  HX ORTHOPAEDIC    
 carpal tunnel  HX ORTHOPAEDIC    
 back surgery No family history on file. Social History Socioeconomic History  Marital status:  Spouse name: Not on file  Number of children: Not on file  Years of education: Not on file  Highest education level: Not on file Social Needs  Financial resource strain: Not on file  Food insecurity - worry: Not on file  Food insecurity - inability: Not on file  Transportation needs - medical: Not on file  Transportation needs - non-medical: Not on file Occupational History  Not on file Tobacco Use  Smoking status: Never Smoker  Smokeless tobacco: Never Used Substance and Sexual Activity  Alcohol use: No  
 Drug use: No  
 Sexual activity: Not on file Other Topics Concern  Not on file Social History Narrative  Not on file ALLERGIES: Patient has no known allergies. Review of Systems Constitutional: Negative for chills and fever. Gastrointestinal: Negative for nausea and vomiting. All other systems reviewed and are negative. Vitals:  
 11/18/18 2215 BP: 117/63 Pulse: (!) 59 Resp: 20 Temp: 99.1 °F (37.3 °C) SpO2: 97% Weight: 90.7 kg (200 lb) Height: 5' 9\" (1.753 m) Physical Exam  
Constitutional: He is oriented to person, place, and time. He appears well-developed and well-nourished. HENT:  
Head: Normocephalic and atraumatic. Eyes: Conjunctivae are normal. Pupils are equal, round, and reactive to light. Neck: Normal range of motion. Neck supple. Cardiovascular: Normal rate and regular rhythm. Murmur heard. Pulmonary/Chest: Effort normal and breath sounds normal.  
Abdominal: Soft. Bowel sounds are normal. He exhibits distension. There is no tenderness. There is no rebound. Musculoskeletal: Normal range of motion. He exhibits no edema. Neurological: He is alert and oriented to person, place, and time. Skin: Skin is warm and dry. Nursing note and vitals reviewed. MDM Number of Diagnoses or Management Options Diagnosis management comments: 2:44 AM discussed results with the patient. Three-hour troponin was initially elevated but on repeat was normal.  We will repeat this test with a lab troponin. I have left his contact information with [facility] cardiology for close outpatient follow-up. If his repeat troponin is normal, he will be discharged home. If it is elevated, he will be transferred to [facility] for cardiac evaluation. Amount and/or Complexity of Data Reviewed Clinical lab tests: ordered and reviewed Tests in the radiology section of CPT®: ordered and reviewed Tests in the medicine section of CPT®: reviewed and ordered Discuss the patient with other providers: yes Risk of Complications, Morbidity, and/or Mortality Presenting problems: moderate Diagnostic procedures: moderate Management options: moderate Patient Progress Patient progress: stable Procedures

## 2018-11-19 NOTE — DISCHARGE INSTRUCTIONS
Follow-up with [facility]iology. They will call you later today to set up an appointment time. Return to the emergency department if your symptoms worsen.

## 2018-11-20 PROBLEM — R01.1 MURMUR, CARDIAC: Status: ACTIVE | Noted: 2018-01-01

## 2018-11-20 PROBLEM — R05.9 COUGH: Status: ACTIVE | Noted: 2018-01-01

## 2018-11-20 PROBLEM — R07.2 PRECORDIAL PAIN: Status: ACTIVE | Noted: 2018-01-01

## 2018-12-05 NOTE — PROGRESS NOTES
Called and informed pt of normal lab results. Pt voiced understanding. Pt then stated was going to have to cancel the stress test.  Could not walk on the treadmill. Informed pt he is scheduled for a nuclear stress test.  Can just tell the nurse doing test that he is not able to walk on treadmill and test will be changed to a lexiscan and he will be given a med through iv and will not have to walk on the treadmill.   Pt voiced understanding and stated will keep appointment./wc

## 2018-12-28 PROBLEM — R14.0 ABDOMINAL DISTENSION: Status: ACTIVE | Noted: 2018-01-01

## 2018-12-28 PROBLEM — E87.1 HYPONATREMIA: Status: ACTIVE | Noted: 2018-01-01

## 2018-12-28 PROBLEM — R18.8 ASCITES: Status: ACTIVE | Noted: 2018-01-01

## 2018-12-28 PROBLEM — J18.9 LEFT LOWER LOBE PNEUMONIA: Status: ACTIVE | Noted: 2018-01-01

## 2018-12-28 PROBLEM — J96.00 ACUTE RESPIRATORY FAILURE (HCC): Status: ACTIVE | Noted: 2018-01-01

## 2018-12-28 PROBLEM — K74.60 CIRRHOSIS (HCC): Status: ACTIVE | Noted: 2018-01-01

## 2018-12-28 NOTE — ED PROVIDER NOTES
11year-old male with a history of alcoholic cirrhosis presents with weight gain, swelling of both his legs and abdomen Family also reports that patient has been weak and falling morning last day or 2. Patient is supposed to increase his diuretic. However, due to his worsening symptoms. He was sent to the ER for further evaluation Patient has had prior paracentesis, [place] Recent course of Zithromax for left lower lobe infiltrate ineffective patient's cough and dyspnea on exertion or worse The history is provided by the patient, the spouse and a relative. Fatigue This is a recurrent problem. The current episode started more than 1 week ago. The problem has been gradually worsening. There was no focality noted. Primary symptoms include loss of balance. Pertinent negatives include no focal weakness, no memory loss, no movement disorder, no visual change and no mental status change. There has been no fever. Associated symptoms include nausea. Pertinent negatives include no shortness of breath, no chest pain, no vomiting, no altered mental status, no confusion and no headaches. Associated medical issues do not include trauma or seizures. Past Medical History:  
Diagnosis Date  Bronchitis  Cirrhosis of liver with ascites [address] [address]  Hypertension  Ulcer of ileum   
 ulcer Past Surgical History:  
Procedure Laterality Date  HX APPENDECTOMY  HX ORTHOPAEDIC    
 carpal tunnel  HX ORTHOPAEDIC    
 back surgery History reviewed. No pertinent family history. Social History Socioeconomic History  Marital status:  Spouse name: Not on file  Number of children: Not on file  Years of education: Not on file  Highest education level: Not on file Social Needs  Financial resource strain: Not on file  Food insecurity - worry: Not on file  Food insecurity - inability: Not on file  Transportation needs - medical: Not on file  Transportation needs - non-medical: Not on file Occupational History  Not on file Tobacco Use  Smoking status: Never Smoker  Smokeless tobacco: Never Used Substance and Sexual Activity  Alcohol use: No  
 Drug use: No  
 Sexual activity: Not on file Other Topics Concern  Not on file Social History Narrative  Not on file ALLERGIES: Hydromet [hydrocodone-homatropine] Review of Systems Constitutional: Positive for fatigue. Negative for activity change, chills, diaphoresis and fever. HENT: Negative for dental problem, hearing loss, nosebleeds, rhinorrhea and sore throat. Eyes: Negative for pain, discharge, redness and visual disturbance. Respiratory: Negative for cough, chest tightness and shortness of breath. Cardiovascular: Negative for chest pain, palpitations and leg swelling. Gastrointestinal: Positive for abdominal distention and nausea. Negative for abdominal pain, constipation, diarrhea and vomiting. Endocrine: Negative for cold intolerance, heat intolerance, polydipsia and polyuria. Genitourinary: Negative for dysuria and flank pain. Musculoskeletal: Positive for arthralgias and back pain. Negative for joint swelling, myalgias and neck pain. Skin: Negative for pallor and rash. Allergic/Immunologic: Negative for environmental allergies and food allergies. Neurological: Positive for loss of balance. Negative for dizziness, tremors, focal weakness, light-headedness, numbness and headaches. Hematological: Negative for adenopathy. Does not bruise/bleed easily. Psychiatric/Behavioral: Negative for confusion, dysphoric mood and memory loss. The patient is not nervous/anxious and is not hyperactive. All other systems reviewed and are negative. Vitals:  
 12/28/18 1224 BP: 114/70 Pulse: 76 Resp: 18 Temp: 97.7 °F (36.5 °C) SpO2: 98% Weight: 102.5 kg (226 lb) Height: 5' 9\" (1.753 m) Physical Exam  
Constitutional: He is oriented to person, place, and time. He appears well-developed and well-nourished. He appears distressed. HENT:  
Head: Normocephalic and atraumatic. Right Ear: External ear normal.  
Left Ear: External ear normal.  
Mouth/Throat: Oropharynx is clear and moist. No oropharyngeal exudate. Eyes: Conjunctivae and EOM are normal. Pupils are equal, round, and reactive to light. No scleral icterus. Neck: Normal range of motion. Neck supple. No JVD present. No thyromegaly present. Cardiovascular: Normal rate, regular rhythm, normal heart sounds and intact distal pulses. Exam reveals no gallop and no friction rub. No murmur heard. Pulmonary/Chest: Effort normal. No respiratory distress. He has decreased breath sounds. He has no wheezes. Abdominal: Soft. Bowel sounds are normal. He exhibits distension and ascites. There is no hepatosplenomegaly. There is no tenderness. Musculoskeletal: Normal range of motion. He exhibits no deformity. Right lower leg: He exhibits tenderness and edema. Left lower leg: He exhibits tenderness and edema. Neurological: He is alert and oriented to person, place, and time. No cranial nerve deficit or sensory deficit. He exhibits normal muscle tone. Coordination normal.  
Skin: Skin is warm and dry. Capillary refill takes less than 2 seconds. No rash noted. Psychiatric: He has a normal mood and affect. His behavior is normal. Judgment and thought content normal.  
Nursing note and vitals reviewed. MDM Number of Diagnoses or Management Options Ascites due to alcoholic hepatitis: established and worsening Hyponatremia: established and worsening Pneumonia of left lower lobe due to infectious organism [facility]): established and worsening Diagnosis management comments: EKG reviewed Sinus rhythm Normal intervals, normal axis Poor baseline No acute ischemic changes Amount and/or Complexity of Data Reviewed Clinical lab tests: ordered and reviewed Tests in the radiology section of CPT®: ordered and reviewed Tests in the medicine section of CPT®: ordered and reviewed Review and summarize past medical records: yes Discuss the patient with other providers: yes Independent visualization of images, tracings, or specimens: yes Risk of Complications, Morbidity, and/or Mortality Presenting problems: high Diagnostic procedures: moderate Management options: moderate General comments: Elements of this note have been dictated via voice recognition software. Text and phrases may be limited by the accuracy of the software. The chart has been reviewed, but errors may still be present. Patient Progress Patient progress: improved Procedures

## 2018-12-28 NOTE — H&P
HOSPITALIST H&P/CONSULTNAME:  Hussein Jiménez Age:  72 y.o. 
:   1945 MRN:   [id number] PCP: Aldo Hernandez MD 
Consulting MD: Treatment Team: Attending Provider: Justina Vann MD; Primary Nurse: Chaim Masters RN 
HPI:  
Patient is a 72years old male with pmhx of alcoholic cirrhosis diagnosed in 2018, HTN, bronchitis presented with gradual worsening of abdominal distension and shortness of breath for past several weeks. Pt reports of generalized weakness, malaise for months, getting worse recently which is associated with progressive increase in abdominal girth and difficulty in breathing. He also endorses extensive bilateral pedal edema. He is compliant with lasix and aldactone. As per the wife, pt drinks a lot of water. He has had 2 paracentesis in [place]. He recently moved with his daughter in [place]. He is following up with GI at [facility]. He denies fever, chills, nausea/vomiting, diarrhea, melena/heatemesis, chest pain, cough, rhinorrhea/URI, sore throat. He does c/o lightheadedness/dizziness, abdominal discomfort. In ER, Na was 116, CXR showing left lower lobe haziness concerning for effusion vs infiltrates. He is saturating at 97% on room air. Complete ROS done and is as stated in HPI or otherwise negative Past Medical History:  
Diagnosis Date  Bronchitis  Cirrhosis of liver with ascites ([facility] [address]  Hypertension  Ulcer of ileum   
 ulcer Past Surgical History:  
Procedure Laterality Date  HX APPENDECTOMY  HX ORTHOPAEDIC    
 carpal tunnel  HX ORTHOPAEDIC    
 back surgery Prior to Admission Medications Prescriptions Last Dose Informant Patient Reported? Taking?  
doxazosin (CARDURA) 4 mg tablet   No No  
Sig: Take 1 Tab by mouth daily. ferrous sulfate (IRON) 325 mg (65 mg iron) tablet   Yes Yes Sig: Take  by mouth Daily (before breakfast). furosemide (LASIX) 40 mg tablet   No No  
Sig: Take 1 Tab by mouth daily. gabapentin (NEURONTIN) 400 mg capsule   No No  
Sig: Take 1 Cap by mouth three (3) times daily. losartan (COZAAR) 50 mg tablet   No No  
Sig: TAKE 1 TABLET BY MOUTH DAILY pantoprazole (PROTONIX) 40 mg tablet   No No  
Sig: Take 1 Tab by mouth daily. spironolactone (ALDACTONE) 50 mg tablet   Yes No  
Sig: Take 150 mg by mouth daily. Facility-Administered Medications: None Allergies Allergen Reactions  Hydromet [Hydrocodone-Homatropine] Nausea Only Dry heaves Social History Tobacco Use  Smoking status: Never Smoker  Smokeless tobacco: Never Used Substance Use Topics  Alcohol use: No  
  
History reviewed. No pertinent family history. Objective:  
 
Visit Vitals /73 Pulse 69 Temp 97.7 °F (36.5 °C) Resp 18 Ht 5' 9\" (1.753 m) Wt 102.5 kg (226 lb) SpO2 98% BMI 33.37 kg/m² Temp (24hrs), Av.7 °F (36.5 °C), Min:97.7 °F (36.5 °C), Max:97.7 °F (36.5 °C) Oxygen Therapy O2 Sat (%): 98 % (18 1224) O2 Device: Room air (18 1224) Physical Exam: 
General:    Alert, awake, lethargic, mild distress due to distended belly Head:   Normocephalic, without obvious abnormality, atraumatic. Nose:  Nares normal. No drainage or sinus tenderness. Lungs:   Clear except at bases with crackles L>R, no wheezing Heart:   Regular rate and rhythm,  no murmur, rub or gallop. Abdomen:   Massively distended, non tender, organomegaly could not be appreciated, fluid thrill++, dull percussion, BS+ Extremities: 3+ pitting pedal edema all the way up to mid thigh Skin:     Texture, turgor normal. No rashes or lesions. Not Jaundiced Neurologic: GCS 15, no motor or sensory deficits, CN 2-12 intact Psych:             AOx3, mood and affect flat Data Review:  
Recent Results (from the past 24 hour(s)) CBC WITH AUTOMATED DIFF Collection Time: 18 12:44 PM  
Result Value Ref Range WBC 5.8 4.3 - 11.1 K/uL  
 RBC 3.77 (L) 4.23 - 5.6 M/uL HGB 10.8 (L) 13.6 - 17.2 g/dL HCT 31.7 (L) 41.1 - 50.3 % MCV 84.1 79.6 - 97.8 FL  
 MCH 28.6 26.1 - 32.9 PG  
 MCHC 34.1 31.4 - 35.0 g/dL  
 RDW 19.2 (H) 11.9 - 14.6 % PLATELET 138 (L) 885 - 450 K/uL MPV 10.6 9.4 - 12.3 FL ABSOLUTE NRBC 0.00 0.0 - 0.2 K/uL  
 DF AUTOMATED NEUTROPHILS 72 43 - 78 % LYMPHOCYTES 10 (L) 13 - 44 % MONOCYTES 16 (H) 4.0 - 12.0 % EOSINOPHILS 1 0.5 - 7.8 % BASOPHILS 0 0.0 - 2.0 % IMMATURE GRANULOCYTES 1 0.0 - 5.0 %  
 ABS. NEUTROPHILS 4.2 1.7 - 8.2 K/UL  
 ABS. LYMPHOCYTES 0.6 0.5 - 4.6 K/UL  
 ABS. MONOCYTES 1.0 0.1 - 1.3 K/UL  
 ABS. EOSINOPHILS 0.1 0.0 - 0.8 K/UL  
 ABS. BASOPHILS 0.0 0.0 - 0.2 K/UL  
 ABS. IMM. GRANS. 0.1 0.0 - 0.5 K/UL METABOLIC PANEL, COMPREHENSIVE Collection Time: 12/28/18 12:44 PM  
Result Value Ref Range Sodium 116 (LL) 136 - 145 mmol/L Potassium 4.9 3.5 - 5.1 mmol/L Chloride 83 (L) 98 - 107 mmol/L  
 CO2 25 21 - 32 mmol/L Anion gap 8 mmol/L Glucose 133 (H) 65 - 100 mg/dL BUN 12 8 - 23 MG/DL Creatinine 0.97 0.8 - 1.5 MG/DL  
 GFR est AA >60 >60 ml/min/1.73m2 GFR est non-AA >60 ml/min/1.73m2 Calcium 8.5 8.3 - 10.4 MG/DL Bilirubin, total 1.2 (H) 0.2 - 1.1 MG/DL  
 ALT (SGPT) 38 12 - 65 U/L  
 AST (SGOT) 48 (H) 15 - 37 U/L Alk. phosphatase 125 50 - 136 U/L Protein, total 7.0 g/dL Albumin 2.9 (L) 3.2 - 4.6 g/dL Globulin 4.1 (H) 2.3 - 3.5 g/dL A-G Ratio 0.7 TROPONIN I Collection Time: 12/28/18 12:44 PM  
Result Value Ref Range Troponin-I, Qt. <0.02 (L) 0.02 - 0.05 NG/ML  
EKG, 12 LEAD, INITIAL Collection Time: 12/28/18  1:05 PM  
Result Value Ref Range Ventricular Rate 72 BPM  
 Atrial Rate 72 BPM  
 P-R Interval 208 ms QRS Duration 82 ms Q-T Interval 366 ms  
 QTC Calculation (Bezet) 400 ms Calculated P Axis 10 degrees Calculated R Axis 9 degrees Calculated T Axis 117 degrees  Diagnosis    
  !! AGE AND GENDER SPECIFIC ECG ANALYSIS !! 
 Normal sinus rhythm Low voltage QRS Nonspecific ST and T wave abnormality Abnormal ECG When compared with ECG of 18-NOV-2018 22:19, No significant change was found Imaging Demetra Mortimer Theora Gentile All diagnostic imaging personally reviewed by me. Two view chest 
  
History: sob,  recent chest x ray x 1 week ago showing possible pneumonia. 
  
Comparison: 11/18/2018 
  
Findings: The heart and mediastinal silhouette are normal in size and 
configuration. There is dense left lower lobe opacity suggesting airspace 
disease and small left pleural effusion. These findings have progressed since 
the previous exam. The right lung appears grossly clear. The pulmonary 
vascularity is within normal limits. The visualized osseous structures are 
unremarkable. 
  
IMPRESSION Impression: Increasing left lower lobe opacity as described. Assessment and Plan: Active Hospital Problems Diagnosis Date Noted  Acute respiratory failure ([address] 12/28/2018  Abdominal distension 12/28/2018  Cirrhosis ([address] 12/28/2018  Hyponatremia 12/28/2018  Ascites 12/28/2018  Left lower lobe pneumonia ([address] 12/28/2018 PLAN · Left lower lobe haziness, likely to be pleural effusion as pt has crackles on exam. Pt denies fever, chills, cough/sputum, URI. · Will check Procalcitonin. For now, will continue IV Rocephin and Azithromycin. · Place a ding cath. · Start IV albumin q6h with IV lasix 40 mg BID and aldactone. · Monitor strict I&Os · Pt has hypo osmolar hyponatremia with serum osmolality of 244. F/u with urine lytes and urine osmolality. Hyponatremia is likely from combination of diuretics and excess free water intake. Will also place on free water restriction. Start salt tablets QID with regular diet. · F/u with US abdomen. IR consult for paracentesis, likely will be on Monday, 12/31/18. · GI consult for AM 
· Ammonia level is 17. Will continue with lactulose. · Will hold losartan since pt will be aggressively diuresed, to prevent NATE · Continue other home meds as reconciled in [facility] · DVT prophylaxis with ESTHER hose · Prn tylenol for mild pain/fever · Prn zofran for nausea/vomiting · Prn opioids for pain Code Status: full High risk Anticipated discharge: >2MN Discussed case with nursing supervisor to initiate process for SFD transfer as pt will need to go SFD for IR guided paracentesis, it would be better if [facility]. 
 
Signed By: Kike Gallegos MD   
 December 28, 2018

## 2018-12-28 NOTE — PROGRESS NOTES
Pt arrived to room and assessment completed at this time. Oriented pt to room/unit. VS done and are stable. SCD's on to BLE's. PIV intact and abx currently infusing. Pt c/o no pain. Abd distended. Call light in reach.

## 2018-12-28 NOTE — ED NOTES
TRANSFER - OUT REPORT: 
 
Verbal report given to Adrián cruz on Jory Fleming  being transferred to [facility] for routine progression of care Report consisted of patients Situation, Background, Assessment and  
Recommendations(SBAR). Information from the following report(s) SBAR, ED Summary and MAR was reviewed with the receiving nurse. Lines:  
Peripheral IV 12/28/18 Right Antecubital (Active) Site Assessment Clean, dry, & intact 12/28/2018 12:41 PM  
Phlebitis Assessment 0 12/28/2018 12:41 PM  
Infiltration Assessment 0 12/28/2018 12:41 PM  
   
Peripheral IV 12/28/18 Left Antecubital (Active) Site Assessment Clean, dry, & intact 12/28/2018  2:05 PM  
Phlebitis Assessment 0 12/28/2018  2:05 PM  
Infiltration Assessment 0 12/28/2018  2:05 PM  
Dressing Status Clean, dry, & intact 12/28/2018  2:05 PM  
Dressing Type Transparent 12/28/2018  2:05 PM  
Hub Color/Line Status Pink 12/28/2018  2:05 PM  
Action Taken Blood drawn 12/28/2018  2:05 PM  
   
Peripheral IV 12/28/18 Left Forearm (Active) Site Assessment Clean, dry, & intact 12/28/2018  2:06 PM  
Phlebitis Assessment 0 12/28/2018  2:06 PM  
Infiltration Assessment 0 12/28/2018  2:06 PM  
Dressing Status Clean, dry, & intact 12/28/2018  2:06 PM  
Dressing Type Transparent 12/28/2018  2:06 PM  
Hub Color/Line Status Pink 12/28/2018  2:06 PM  
Action Taken Blood drawn 12/28/2018  2:06 PM  
  
 
Opportunity for questions and clarification was provided. Patient transported with: 
 [employer]

## 2018-12-28 NOTE — ED TRIAGE NOTES
Pt presents to the ED with increased edema in legs and ascites. Pt reports he has cirrhosis of the liver and increased fluid retention x 1 week,  Has gained 20 lbs in the past week.

## 2018-12-28 NOTE — PROGRESS NOTES
TRANSFER - IN REPORT: 
 
Verbal report received from Sunil Smalls RN(name) on Rohini Longoria  being received from ED(unit) for routine progression of care Report consisted of patients Situation, Background, Assessment and  
Recommendations(SBAR). Information from the following report(s) SBAR, Kardex, ED Summary, Intake/Output, MAR and Recent Results was reviewed with the receiving nurse. Opportunity for questions and clarification was provided. Assessment completed upon patients arrival to unit and care assumed.

## 2018-12-29 NOTE — PROGRESS NOTES
Socioevironmental: 
SW met with patient who states that he lives with his wife Bhavin Lazo ([phone]) who is at the bedside, and has a daughter Mayra Jones) and brother who live locally. Patient states that he drives, is independent at home, and does not require assistance.     
   
Ambulation: 
Patient states that he intends to begin ambulating with a cane as his brother purchased him one for [employer]. Patient states that he's had several falls, states his \"knees give out. \" Patient denies any major injuries. Patient states that he does have a shower chair at home that was also a gift for the holidays.  
  
Primary Care: 
Patient sees Dr. Víctor Hammond for primary care.   
  
Needs:  
Patient to be evaluated by PT and OT while inpatient. SW discussed [facility] services and defined \"homebound. \" Patient and wife agree that he does not meet criteria, and would prefer an order to seek outpatient PT services. LEN compiled a list of PT providers in [facility]. Patient has a hx of ETOH abuse, but has reportedly been sober for a few months. LEN provided the patient with a packet containing PT centers in his area as well as an order for OP PT. In the packet SW included resources for ETOH recovery to aid in his continued sobriety. Patient had wife and daughter at the bedside so SW did not mention resources as it's unclear as to whether they're aware. Patient will transfer DT today per MD. DOMINGO NealW  [email or web address]

## 2018-12-29 NOTE — DISCHARGE SUMMARY
Hospitalist Discharge Summary Patient ID: 
Lul Branch [id number] 
75 y.o. 
1945 Admit date: 12/28/2018 12:46 PM 
Discharge date and time: 12/29/2018 Attending: Lindsay Garcia MD 
PCP:  Fred Kong MD 
Treatment Team: Attending Provider: Lindsay Garcia MD; Care Manager: Cole Will Principal Diagnosis: Hyponatremia Alcoholic cirrhosis Abdominal distension Left lower lobe pneumonia Active Problems: 
  Acute respiratory failure ([address] (12/28/2018) Abdominal distension (12/28/2018) Cirrhosis ([address] (12/28/2018) Hyponatremia (12/28/2018) Ascites (12/28/2018) Left lower lobe pneumonia ([address] (12/28/2018) Hospital Course: 
Please refer to the admission H&P for details of presentation. In summary, the patient is 72years old male with pmhx of alcoholic cirrhosis diagnosed in 07/2018, HTN, bronchitis admitted on 12/28 with hypo-osmolar hyponatremia along with ascites/abdominal distension and left LL PNA. In ER, Na was 116, CXR showing left lower lobe haziness concerning for effusion vs infiltrates. He is saturating at 97% on room air. Pt was started on IV albumin q6h along with IV lasix and oral aldactone. Pt has diuresed well since admission, is negative 3.5 ltrs fluid balance. Lasix can be switched to oral in next 24-48 hours if BP is borderline. Pt was also started on salt tablets, hyponatremia is improving, 122  (latest). Pt will need GI eval and IR guided paracentesis. Pt can possibly be discharged on Monday (12/31) following paracentesis. Pt will need oral lasix and aldactone on discharge. Pt is hemodynamically stable for transfer. Pt is on IV azithro and rocephin for possible LLL PNA. CXR should be repeated in AM, if LLL opacity has improved, it's likely pleural effusion from massive ascites. Rest of the hospital course has been uneventful so far.  
 
 
 
Significant Diagnostic Studies:  
Abdominal Ultrasound 
  
 INDICATION:  evaluate for ascites 
  
FINDINGS: There is a cirrhotic liver morphology with perihepatic ascites. The 
gallbladder wall is thickened. This measures 5 mm. No gallstones. There is no 
bile duct dilatation. The common bile duct measures 5 mm. The main portal vein 
demonstrates hepatopedal flow. 
  
The right kidney measures 9.1 cm in length. The left kidney measures 10.6 cm. There is a simple cyst within the left kidney measuring 5.6 x 5.6 x 5.5 cm There 
is no hydronephrosis. There is no evidence of a renal mass. There is no ascites. The aorta and inferior vena cava are normal in caliber. 
  
4 quadrant ascites noted. 
  
IMPRESSION IMPRESSION:  
1. Cirrhotic liver morphology with 4 quadrant ascites. 2. Thickening of the gallbladder wall, which could be the sequela of portal 
venous hypertension. 3. Simple cyst left kidney. Two view chest 
  
History: sob,  recent chest x ray x 1 week ago showing possible pneumonia. 
  
Comparison: 11/18/2018 
  
Findings: The heart and mediastinal silhouette are normal in size and 
configuration. There is dense left lower lobe opacity suggesting airspace 
disease and small left pleural effusion. These findings have progressed since 
the previous exam. The right lung appears grossly clear. The pulmonary 
vascularity is within normal limits. The visualized osseous structures are 
unremarkable. 
  
IMPRESSION Impression: Increasing left lower lobe opacity as described. Labs: Results:  
   
Chemistry Recent Labs  
  12/29/18 
1555 12/29/18 
9715 12/28/18 
1638 * 98 133* * 121* 118* K 4.1 4.1 4.8  
CL 88* 87* 86* CO2 21 23 21 BUN 14 11 12 CREA 1.18 0.83 0.87 CA 7.8* 8.0* 8.2* AGAP 13 11 11  97 123 TP 6.3 6.2 6.8 ALB 3.0* 2.8* 3.0*  
GLOB 3.3 3.4 3.8* AGRAT 0.9 0.8 0.8 CBC w/Diff Recent Labs  
  12/29/18 
0713 12/28/18 
1244 WBC 4.9 5.8  
RBC 3.21* 3.77* HGB 9.2* 10.8* HCT 26.4* 31.7* * 144* GRANS  --  72  
LYMPH  --  10* EOS  --  1 Cardiac Enzymes No results for input(s): CPK, CKND1, JUAN ALBERTO in the last 72 hours. No lab exists for component: Cory Saldivar Coagulation No results for input(s): PTP, INR, APTT in the last 72 hours. No lab exists for component: INREXT Lipid Panel Lab Results Component Value Date/Time Cholesterol, total 160 12/04/2018 10:12 AM  
 HDL Cholesterol 71 (H) 12/04/2018 10:12 AM  
 LDL, calculated 83.8 12/04/2018 10:12 AM  
 VLDL, calculated 5.2 (L) 12/04/2018 10:12 AM  
 Triglyceride 26 (L) 12/04/2018 10:12 AM  
 CHOL/HDL Ratio 2.3 12/04/2018 10:12 AM  
  
BNP No results for input(s): BNPP in the last 72 hours. Liver Enzymes Recent Labs  
  12/29/18 
1555 TP 6.3 ALB 3.0*  
 SGOT 37 Thyroid Studies No results found for: T4, T3U, TSH, TSHEXT Discharge Exam: 
Visit Vitals BP 97/55 (BP 1 Location: Right arm, BP Patient Position: At rest;Head of bed elevated (Comment degrees)) Pulse 87 Temp 98.1 °F (36.7 °C) Resp 18 Ht 5' 9\" (1.753 m) Wt 102.5 kg (226 lb) SpO2 93% BMI 33.37 kg/m² General:          Alert, awake, NAD Head:               Normocephalic, without obvious abnormality, atraumatic. Nose:               Nares normal. No drainage or sinus tenderness. Lungs:             Clear except at bases with crackles L>R, no wheezing Heart:              Regular rate and rhythm,  no murmur, rub or gallop. Abdomen:        distended, non tender, organomegaly could not be appreciated, fluid thrill++, dull percussion, BS+ Extremities:     2+ pitting pedal edema Skin:                Texture, turgor normal. No rashes or lesions. Not Jaundiced Neurologic:      GCS 15, no motor or sensory deficits, CN 2-12 intact Psych:             AOx3, mood and affect flat Disposition: SFD Discharge Condition: stable Patient Instructions:  
Current Discharge Medication List  
  
 
 
Activity: Activity as tolerated Diet: Regular Diet Wound Care: None needed Follow-up ·   Follow up with Hospitalist and GI in [facility] Time spent to discharge patient 35 minutes Signed: 
Celio Juarez MD 
12/29/2018 
4:49 PM

## 2018-12-29 NOTE — PROGRESS NOTES
HOSPITALIST Progress note NAME:  Tomeka Singleton Age:  72 y.o. 
:   1945 MRN:   [id number] PCP: Austen Mike MD 
Consulting MD: Treatment Team: Attending Provider: Lars Louise MD 
SUBJECTIVE:  
Patient is a 72years old male with pmhx of alcoholic cirrhosis diagnosed in 2018, HTN, bronchitis admitted on  with hypo-osmolar hyponatremia along with ascites/abdominal distension and left LL PNA. In ER, Na was 116, CXR showing left lower lobe haziness concerning for effusion vs infiltrates. He is saturating at 97% on room air. : 
Pt seen at bedside. Reports feeling better. No respiratory distress or chest pain/abdominal pain. No fever, chills, nausea/vomiting, diarrhea. No overnight events. Pt reports improvement in abdominal distension and pedal edema. 10 point ROS negative except what mentioned above. Prior to Admission Medications Prescriptions Last Dose Informant Patient Reported? Taking?  
doxazosin (CARDURA) 4 mg tablet   No No  
Sig: Take 1 Tab by mouth daily. ferrous sulfate (IRON) 325 mg (65 mg iron) tablet   Yes Yes Sig: Take  by mouth Daily (before breakfast). furosemide (LASIX) 40 mg tablet   No No  
Sig: Take 1 Tab by mouth daily. gabapentin (NEURONTIN) 400 mg capsule   No No  
Sig: Take 1 Cap by mouth three (3) times daily. losartan (COZAAR) 50 mg tablet   No No  
Sig: TAKE 1 TABLET BY MOUTH DAILY pantoprazole (PROTONIX) 40 mg tablet   No No  
Sig: Take 1 Tab by mouth daily. spironolactone (ALDACTONE) 50 mg tablet   Yes No  
Sig: Take 150 mg by mouth daily. Facility-Administered Medications: None Objective:  
 
Visit Vitals /64 (BP 1 Location: Left arm, BP Patient Position: At rest) Pulse 78 Temp 98.3 °F (36.8 °C) Resp 18 Ht 5' 9\" (1.753 m) Wt 102.5 kg (226 lb) SpO2 94% BMI 33.37 kg/m² Temp (24hrs), Av.9 °F (36.6 °C), Min:97.2 °F (36.2 °C), Max:98.3 °F (36.8 °C) Oxygen Therapy O2 Sat (%): 94 % (12/29/18 0327) Pulse via Oximetry: 82 beats per minute (12/28/18 1456) O2 Device: Room air (12/28/18 1545) Physical Exam: 
General:    Alert, awake, NAD Head:   Normocephalic, without obvious abnormality, atraumatic. Nose:  Nares normal. No drainage or sinus tenderness. Lungs:   Clear except at bases with crackles L>R, no wheezing Heart:   Regular rate and rhythm,  no murmur, rub or gallop. Abdomen:   Massively distended, non tender, organomegaly could not be appreciated, fluid thrill++, dull percussion, BS+ Extremities: 2+ pitting pedal edema all the way up to mid thigh Skin:     Texture, turgor normal. No rashes or lesions. Not Jaundiced Neurologic: GCS 15, no motor or sensory deficits, CN 2-12 intact Psych:             AOx3, mood and affect flat Data Review:  
Recent Results (from the past 24 hour(s)) CBC WITH AUTOMATED DIFF Collection Time: 12/28/18 12:44 PM  
Result Value Ref Range WBC 5.8 4.3 - 11.1 K/uL  
 RBC 3.77 (L) 4.23 - 5.6 M/uL  
 HGB 10.8 (L) 13.6 - 17.2 g/dL HCT 31.7 (L) 41.1 - 50.3 % MCV 84.1 79.6 - 97.8 FL  
 MCH 28.6 26.1 - 32.9 PG  
 MCHC 34.1 31.4 - 35.0 g/dL  
 RDW 19.2 (H) 11.9 - 14.6 % PLATELET 690 (L) 445 - 450 K/uL MPV 10.6 9.4 - 12.3 FL ABSOLUTE NRBC 0.00 0.0 - 0.2 K/uL  
 DF AUTOMATED NEUTROPHILS 72 43 - 78 % LYMPHOCYTES 10 (L) 13 - 44 % MONOCYTES 16 (H) 4.0 - 12.0 % EOSINOPHILS 1 0.5 - 7.8 % BASOPHILS 0 0.0 - 2.0 % IMMATURE GRANULOCYTES 1 0.0 - 5.0 %  
 ABS. NEUTROPHILS 4.2 1.7 - 8.2 K/UL  
 ABS. LYMPHOCYTES 0.6 0.5 - 4.6 K/UL  
 ABS. MONOCYTES 1.0 0.1 - 1.3 K/UL  
 ABS. EOSINOPHILS 0.1 0.0 - 0.8 K/UL  
 ABS. BASOPHILS 0.0 0.0 - 0.2 K/UL  
 ABS. IMM. GRANS. 0.1 0.0 - 0.5 K/UL METABOLIC PANEL, COMPREHENSIVE Collection Time: 12/28/18 12:44 PM  
Result Value Ref Range Sodium 116 (LL) 136 - 145 mmol/L Potassium 4.9 3.5 - 5.1 mmol/L  Chloride 83 (L) 98 - 107 mmol/L  
 CO2 25 21 - 32 mmol/L  
 Anion gap 8 mmol/L Glucose 133 (H) 65 - 100 mg/dL BUN 12 8 - 23 MG/DL Creatinine 0.97 0.8 - 1.5 MG/DL  
 GFR est AA >60 >60 ml/min/1.73m2 GFR est non-AA >60 ml/min/1.73m2 Calcium 8.5 8.3 - 10.4 MG/DL Bilirubin, total 1.2 (H) 0.2 - 1.1 MG/DL  
 ALT (SGPT) 38 12 - 65 U/L  
 AST (SGOT) 48 (H) 15 - 37 U/L Alk. phosphatase 125 50 - 136 U/L Protein, total 7.0 g/dL Albumin 2.9 (L) 3.2 - 4.6 g/dL Globulin 4.1 (H) 2.3 - 3.5 g/dL A-G Ratio 0.7 TROPONIN I Collection Time: 12/28/18 12:44 PM  
Result Value Ref Range Troponin-I, Qt. <0.02 (L) 0.02 - 0.05 NG/ML  
PROCALCITONIN Collection Time: 12/28/18 12:44 PM  
Result Value Ref Range Procalcitonin 0.1 ng/mL EKG, 12 LEAD, INITIAL Collection Time: 12/28/18  1:05 PM  
Result Value Ref Range Ventricular Rate 72 BPM  
 Atrial Rate 72 BPM  
 P-R Interval 208 ms QRS Duration 82 ms Q-T Interval 366 ms  
 QTC Calculation (Bezet) 400 ms Calculated P Axis 10 degrees Calculated R Axis 9 degrees Calculated T Axis 117 degrees Diagnosis    
  !! AGE AND GENDER SPECIFIC ECG ANALYSIS !! Normal sinus rhythm Low voltage QRS Nonspecific ST and T wave abnormality Abnormal ECG When compared with ECG of 18-NOV-2018 22:19, No significant change was found Confirmed by FLORA CHARLES ()Brisa ([id number]) on 12/28/2018 4:37:18 PM 
  
AMMONIA Collection Time: 12/28/18  1:58 PM  
Result Value Ref Range Ammonia 17 (L) 24.9 - 68 UMOL/L  
CULTURE, BLOOD Collection Time: 12/28/18  1:59 PM  
Result Value Ref Range Special Requests: FOREARM 
LEFT Culture result: PENDING   
CHLORIDE, URINE RANDOM Collection Time: 12/28/18  3:01 PM  
Result Value Ref Range Chloride,urine random 86 MMOL/L  
OSMOLALITY, UR Collection Time: 12/28/18  3:01 PM  
Result Value Ref Range Osmolality,urine 238 MOSM/kg H2O  
POTASSIUM, UR, RANDOM Collection Time: 12/28/18  3:01 PM  
Result Value Ref Range Potassium urine, random 11 MMOL/L  
CREATININE, UR, RANDOM Collection Time: 12/28/18  3:01 PM  
Result Value Ref Range Creatinine, urine 20.00 mg/dL SODIUM, UR, RANDOM Collection Time: 12/28/18  3:01 PM  
Result Value Ref Range Sodium,urine random 78 MMOL/L  
EOSINOPHILS, URINE Collection Time: 12/28/18  3:01 PM  
Result Value Ref Range Eosinophils,urine NEGATIVE  NEG    
METABOLIC PANEL, COMPREHENSIVE Collection Time: 12/28/18  4:38 PM  
Result Value Ref Range Sodium 118 (LL) 136 - 145 mmol/L Potassium 4.8 3.5 - 5.1 mmol/L Chloride 86 (L) 98 - 107 mmol/L  
 CO2 21 21 - 32 mmol/L Anion gap 11 mmol/L Glucose 133 (H) 65 - 100 mg/dL BUN 12 8 - 23 MG/DL Creatinine 0.87 0.8 - 1.5 MG/DL  
 GFR est AA >60 >60 ml/min/1.73m2 GFR est non-AA >60 ml/min/1.73m2 Calcium 8.2 (L) 8.3 - 10.4 MG/DL Bilirubin, total 1.3 (H) 0.2 - 1.1 MG/DL  
 ALT (SGPT) 36 12 - 65 U/L  
 AST (SGOT) 50 (H) 15 - 37 U/L Alk. phosphatase 123 50 - 136 U/L Protein, total 6.8 g/dL Albumin 3.0 (L) 3.2 - 4.6 g/dL Globulin 3.8 (H) 2.3 - 3.5 g/dL A-G Ratio 0.8 Imaging [facility] [place] Lockhart Piggs All diagnostic imaging personally reviewed by me. Two view chest 
  
History: sob,  recent chest x ray x 1 week ago showing possible pneumonia. 
  
Comparison: 11/18/2018 
  
Findings: The heart and mediastinal silhouette are normal in size and 
configuration. There is dense left lower lobe opacity suggesting airspace 
disease and small left pleural effusion. These findings have progressed since 
the previous exam. The right lung appears grossly clear. The pulmonary 
vascularity is within normal limits. The visualized osseous structures are 
unremarkable. 
  
IMPRESSION Impression: Increasing left lower lobe opacity as described. Assessment and Plan: Active Hospital Problems Diagnosis Date Noted  Acute respiratory failure ([address] 12/28/2018  Abdominal distension 12/28/2018  Cirrhosis ([facility] [address] 12/28/2018  Hyponatremia 12/28/2018  Ascites 12/28/2018  Left lower lobe pneumonia ([facility] [address] 12/28/2018 PLAN 
· Plan to transfer to [facility] for further care and IR guided paracentesis. Paracentesis won't be done until 12/31/18 · Clinically better, continue with IV lasix, albumin and oral aldactone. · Net fluid balance negative 2.8 ltrs. · Continue IV rocephin and azithro, D2 
· procal < 0.1 · Continue ding cath. Will give pyridium · Monitor strict I&Os · Sodium is improving, 121 today. SIADH r/o. Urine lytes and osmolality, suggestive of diuretic induced hyponatremia. Continue salt tablets · US ab showed gross ascites with thickened GB wall as a sequela of portal venous HTN 
· GI consult for today · Ammonia level is 17. Continue lactulose · Continue other home meds as reconciled in [facility] · DVT prophylaxis with ESTHER hose · Prn tylenol for mild pain/fever · Prn zofran for nausea/vomiting · Prn opioids for pain Code Status: full High risk Anticipated discharge:  Pending Signed By: Jessica Estrada MD   
 December 29, 2018

## 2018-12-29 NOTE — PROGRESS NOTES
Problem: Self Care Deficits Care Plan (Adult) Goal: *Acute Goals and Plan of Care (Insert Text) 1. Patient will complete lower body bathing and dressing with supervision and adaptive equipment as needed. 2. Patient will complete toileting with supervision. 3. Patient will tolerate 30 minutes of OT treatment with no rest breaks to increase activity tolerance for ADLs. 4. Patient will complete functional transfers with supervision and adaptive equipment as needed. Timeframe: 7 visits OCCUPATIONAL THERAPY: Initial Assessment and Treatment Day: 1st 12/29/2018INPATIENT: Hospital Day: 2 Payor: Lidia Ley / Plan: [id number] / Product Type: Managed Care Medicare /  
  
NAME/AGE/GENDER: Jory Fleming is a 72 y.o. male PRIMARY DIAGNOSIS:  Acute respiratory failure ([address] Hyponatremia Abdominal distension Cirrhosis ([address] Ascites Left lower lobe pneumonia ([facility] [address] <principal problem not specified> <principal problem not specified> 
 
  
ICD-10: Treatment Diagnosis:  
 · Generalized Muscle Weakness (M62.81) · Other lack of cordination (R27.8) Precautions/Allergies: 
   Hydromet [hydrocodone-homatropine] ASSESSMENT:  
Mr. Jefry Peralta presents with fluid overload secondary to liver cirrhosis. He is moving with SBA despite significant fluid in LE's. He has minor deficits in balance and activity tolerance and will benefit from an ADL tx session tomorrow. He will be able to return home at discharge with family. Likely will not need OT at home. Initiate OT POC. This section established at most recent assessment PROBLEM LIST (Impairments causing functional limitations): 1. Decreased Strength 2. Decreased ADL/Functional Activities 3. Decreased Transfer Abilities 4. Decreased Activity Tolerance INTERVENTIONS PLANNED: (Benefits and precautions of occupational therapy have been discussed with the patient.) 1. Activities of daily living training 2. Neuromuscular re-eduation 3. Therapeutic activity 4. Therapeutic exercise TREATMENT PLAN: Frequency/Duration: Follow patient 1-2tx to address above goals. Rehabilitation Potential For Stated Goals: Excellent RECOMMENDED REHABILITATION/EQUIPMENT: (at time of discharge pending progress): Due to the probability of continued deficits (see above) this patient will not likely need continued skilled occupational therapy after discharge. Equipment:  
? None at this time OCCUPATIONAL PROFILE AND HISTORY:  
History of Present Injury/Illness (Reason for Referral): 
See H&P Past Medical History/Comorbidities:  
Mr. Anne Sargent  has a past medical history of Bronchitis, Cirrhosis of liver with ascites ([facility] [address] Hypertension, and Ulcer of ileum. Mr. Anne Sargent  has a past surgical history that includes hx orthopaedic; hx orthopaedic; and hx appendectomy. Social History/Living Environment:  
Home Environment: Private residence # Steps to Enter: 0 One/Two Story Residence: One story Living Alone: No 
Support Systems: Family member(s), Child(melissa) Patient Expects to be Discharged to[de-identified] Private residence Current DME Used/Available at Home: [place], straight, Walker, Shower chair, Grab bars Prior Level of Function/Work/Activity: 
Independent prior. Number of Personal Factors/Comorbidities that affect the Plan of Care: Brief history (0):  LOW COMPLEXITY ASSESSMENT OF OCCUPATIONAL PERFORMANCE[de-identified]  
Activities of Daily Living:  
Basic ADLs (From Assessment) Complex ADLs (From Assessment) Feeding: Independent Oral Facial Hygiene/Grooming: Setup Bathing: Contact guard assistance Upper Body Dressing: Supervision Lower Body Dressing: Stand-by assistance Toileting: Stand by assistance Grooming/Bathing/Dressing Activities of Daily Living Functional Transfers Bathroom Mobility: Stand-by assistance Toilet Transfer : Stand-by assistance Shower Transfer: Minimum assistance Bed/Mat Mobility Supine to Sit: Stand-by assistance Sit to Stand: Contact guard assistance Bed to Chair: Contact guard assistance Most Recent Physical Functioning:  
Gross Assessment: 
  
         
  
Posture: 
  
Balance: 
Sitting: Intact Standing: With support Bed Mobility: 
Supine to Sit: Stand-by assistance Wheelchair Mobility: 
  
Transfers: 
Sit to Stand: Contact guard assistance Stand to Sit: Contact guard assistance Bed to Chair: Contact guard assistance Patient Vitals for the past 6 hrs: 
 BP BP Patient Position SpO2 Pulse 18 0741   96 % 74  
18 0801 99/61 At rest;Head of bed elevated (Comment degrees) 92 % 79  
18 1209 99/58 At rest;Head of bed elevated (Comment degrees) 93 % 87 Mental Status Neurologic State: Alert Orientation Level: Oriented X4 Cognition: Appropriate decision making Perception: Appears intact Perseveration: No perseveration noted Physical Skills Involved: 1. Range of Motion 2. Balance 3. Strength Cognitive Skills Affected (resulting in the inability to perform in a timely and safe manner): 
1. [facility] Psychosocial Skills Affected: 1. wfl Number of elements that affect the Plan of Care: 1-3:  LOW COMPLEXITY CLINICAL DECISION MAKIN hospitals [address] [id number] AM-PAC 6 Clicks Daily Activity Inpatient Short Form How much help from another person does the patient currently need. .. Total A Lot A Little None 1. Putting on and taking off regular lower body clothing? [] 1   [] 2   [x] 3   [] 4  
2. Bathing (including washing, rinsing, drying)? [] 1   [] 2   [x] 3   [] 4  
3. Toileting, which includes using toilet, bedpan or urinal?   [] 1   [] 2   [x] 3   [] 4  
4. Putting on and taking off regular upper body clothing? [] 1   [] 2   [] 3   [x] 4  
5. Taking care of personal grooming such as brushing teeth? [] 1   [] 2   [] 3   [x] 4  
6. Eating meals? [] 1   [] 2   [] 3   [x] 4 © 2007, Trustees of [address] [place] [address] [address] [address], under license to [employer]. All rights reserved Score:  Initial: 21 Most Recent: X (Date: -- ) Interpretation of Tool:  Represents activities that are increasingly more difficult (i.e. Bed mobility, Transfers, Gait). Score 24 23 22-20 19-15 14-10 9-7 6 Modifier CH CI CJ CK CL CM CN   
 
? Self Care:  
  - CURRENT STATUS: CJ - 20%-39% impaired, limited or restricted  - GOAL STATUS: CI - 1%-19% impaired, limited or restricted  - D/C STATUS:  ---------------To be determined--------------- Payor: Stella Woods / Plan: Yina Taylor / Product Type: Managed Care Medicare /   
 
Medical Necessity:    
· Skilled intervention continues to be required due to Deficits listed above. Reason for Services/Other Comments: 
· Patient continues to require skilled intervention due to fluid overload. Use of outcome tool(s) and clinical judgement create a POC that gives a: MODERATE COMPLEXITY  
 
 
 
TREATMENT:  
(In addition to Assessment/Re-Assessment sessions the following treatments were rendered) Pre-treatment Symptoms/Complaints:   
Pain: Initial:  
Pain Intensity 1: 0  Post Session:  0 Self Care: (10): Procedure(s) (per grid) utilized to improve and/or restore self-care/home management as related to dressing, bathing, toileting and grooming. Required minimal verbal cueing to facilitate activities of daily living skills. Initial evaluation 10 minimal assist. 
 
Braces/Orthotics/Lines/Etc:  
· O2 Device: Room air Treatment/Session Assessment:   
· Response to Treatment:  Good · Interdisciplinary Collaboration:  
o Physical Therapist 
o Occupational Therapist 
· After treatment position/precautions:  
o Call light within reach 
o RN notified 
o Family at bedside · Compliance with Program/Exercises: Compliant all of the time. · Recommendations/Intent for next treatment session:   \"Next visit will focus on advancements to more challenging activities and reduction in assistance provided\". Total Treatment Duration: OT Patient Time In/Time Out Time In: 4535 Time Out: 9425 Bowman Nurse, OT

## 2018-12-29 NOTE — PROGRESS NOTES
Family at bedside. Respirations present, non-labored. Abdomen distended, active BS. [employer] draining at bedside. Denies needs or pain at this time. Aware to call if needs arise.

## 2018-12-29 NOTE — PROGRESS NOTES
Pt c/o nausea. Dr. Yeny Ltot called and orders received for Zofran 4mg prn and will be given IV at this time. Will continue to monitor.

## 2018-12-29 NOTE — H&P
HOSPITALIST H&P/CONSULT  NAME:  Jacqueline Kruse   Age:  72 y.o.  :   1945   MRN:   [id number]  PCP: Sunday Rossi MD  Consulting MD:  Treatment Team: Attending Provider: Asher Nguyen MD  HPI:   Patient is a 72years old male with pmhx of alcoholic cirrhosis diagnosed in 2018, HTN, bronchitis admitted on  with hypo-osmolar hyponatremia along with ascites/abdominal distension and left LL PNA. In ER, Na was 116, CXR showing left lower lobe haziness concerning for effusion vs infiltrates. He is saturating at 97% on room air. Pt was started on IV albumin q6h along with IV lasix and oral aldactone. Pt has diuresed well since admission, is negative 3.5 ltrs fluid balance. Lasix can be switched to oral in next 24-48 hours if BP is borderline. Pt was also started on salt tablets, hyponatremia is improving, 122  (latest). Pt will need GI eval and IR guided paracentesis. Pt can possibly be discharged on Monday () following paracentesis. Pt will need oral lasix and aldactone on discharge. Pt is hemodynamically stable for transfer. Pt is on IV azithro and rocephin for possible LLL PNA. CXR should be repeated in AM, if LLL opacity has improved, it's likely pleural effusion from massive ascites. Complete ROS done and is as stated in HPI or otherwise negative  Past Medical History:   Diagnosis Date    Bronchitis     Cirrhosis of liver with ascites ([address]     Hypertension     Ulcer of ileum     ulcer      Past Surgical History:   Procedure Laterality Date    HX APPENDECTOMY      HX ORTHOPAEDIC      carpal tunnel    HX ORTHOPAEDIC      back surgery      Cannot display prior to admission medications because the patient has not been admitted in this contact.      Allergies   Allergen Reactions    Hydromet [Hydrocodone-Homatropine] Nausea Only     Dry heaves      Social History     Tobacco Use    Smoking status: Never Smoker    Smokeless tobacco: Never Used   Substance Use Topics  Alcohol use: No      No family history on file. Objective: There were no vitals taken for this visit. Temp (24hrs), Av.6 °F (36.4 °C), Min:96.8 °F (36 °C), Max:98.3 °F (36.8 °C)       Physical Exam:  General:          Alert, awake, NAD  Head:               Normocephalic, without obvious abnormality, atraumatic. Nose:               Nares normal. No drainage or sinus tenderness. Lungs:             Clear except at bases with crackles L>R, no wheezing  Heart:              Regular rate and rhythm,  no murmur, rub or gallop. Abdomen:        distended, non tender, organomegaly could not be appreciated, fluid thrill++, dull percussion, BS+   Extremities:     2+ pitting pedal edema   Skin:                Texture, turgor normal. No rashes or lesions.  Not Jaundiced  Neurologic:      GCS 15, no motor or sensory deficits, CN 2-12 intact  Psych:             AOx3, mood and affect flat       Data Review:   Recent Results (from the past 24 hour(s))   CBC W/O DIFF    Collection Time: 18  7:13 AM   Result Value Ref Range    WBC 4.9 4.3 - 11.1 K/uL    RBC 3.21 (L) 4.23 - 5.6 M/uL    HGB 9.2 (L) 13.6 - 17.2 g/dL    HCT 26.4 (L) 41.1 - 50.3 %    MCV 82.2 79.6 - 97.8 FL    MCH 28.7 26.1 - 32.9 PG    MCHC 34.8 31.4 - 35.0 g/dL    RDW 19.0 (H) 11.9 - 14.6 %    PLATELET 985 (L) 598 - 450 K/uL    MPV 11.7 9.4 - 12.3 FL    ABSOLUTE NRBC 0.00 0.0 - 0.2 K/uL   METABOLIC PANEL, COMPREHENSIVE    Collection Time: 18  7:13 AM   Result Value Ref Range    Sodium 121 (LL) 136 - 145 mmol/L    Potassium 4.1 3.5 - 5.1 mmol/L    Chloride 87 (L) 98 - 107 mmol/L    CO2 23 21 - 32 mmol/L    Anion gap 11 mmol/L    Glucose 98 65 - 100 mg/dL    BUN 11 8 - 23 MG/DL    Creatinine 0.83 0.8 - 1.5 MG/DL    GFR est AA >60 >60 ml/min/1.73m2    GFR est non-AA >60 ml/min/1.73m2    Calcium 8.0 (L) 8.3 - 10.4 MG/DL    Bilirubin, total 1.0 0.2 - 1.1 MG/DL    ALT (SGPT) 30 12 - 65 U/L    AST (SGOT) 34 15 - 37 U/L    Alk.  phosphatase 97 50 - 136 U/L    Protein, total 6.2 g/dL    Albumin 2.8 (L) 3.2 - 4.6 g/dL    Globulin 3.4 2.3 - 3.5 g/dL    A-G Ratio 0.8     METABOLIC PANEL, COMPREHENSIVE    Collection Time: 12/29/18  3:55 PM   Result Value Ref Range    Sodium 122 (LL) 136 - 145 mmol/L    Potassium 4.1 3.5 - 5.1 mmol/L    Chloride 88 (L) 98 - 107 mmol/L    CO2 21 21 - 32 mmol/L    Anion gap 13 mmol/L    Glucose 139 (H) 65 - 100 mg/dL    BUN 14 8 - 23 MG/DL    Creatinine 1.18 0.8 - 1.5 MG/DL    GFR est AA >60 >60 ml/min/1.73m2    GFR est non-AA >60 ml/min/1.73m2    Calcium 7.8 (L) 8.3 - 10.4 MG/DL    Bilirubin, total 0.8 0.2 - 1.1 MG/DL    ALT (SGPT) 30 12 - 65 U/L    AST (SGOT) 37 15 - 37 U/L    Alk. phosphatase 111 50 - 136 U/L    Protein, total 6.3 g/dL    Albumin 3.0 (L) 3.2 - 4.6 g/dL    Globulin 3.3 2.3 - 3.5 g/dL    A-G Ratio 0.9       Imaging /Procedures /Studies   All diagnostic imaging personally reviewed by me. Abdominal Ultrasound     INDICATION:  evaluate for ascites     FINDINGS: There is a cirrhotic liver morphology with perihepatic ascites. The  gallbladder wall is thickened. This measures 5 mm. No gallstones. There is no  bile duct dilatation. The common bile duct measures 5 mm. The main portal vein  demonstrates hepatopedal flow.     The right kidney measures 9.1 cm in length. The left kidney measures 10.6 cm. There is a simple cyst within the left kidney measuring 5.6 x 5.6 x 5.5 cm There  is no hydronephrosis. There is no evidence of a renal mass. There is no ascites. The aorta and inferior vena cava are normal in caliber.     4 quadrant ascites noted.     IMPRESSION  IMPRESSION:   1. Cirrhotic liver morphology with 4 quadrant ascites. 2. Thickening of the gallbladder wall, which could be the sequela of portal  venous hypertension. 3. Simple cyst left kidney.        Two view chest     History: sob,  recent chest x ray x 1 week ago showing possible pneumonia.     Comparison: 11/18/2018     Findings:  The heart and mediastinal silhouette are normal in size and  configuration. There is dense left lower lobe opacity suggesting airspace  disease and small left pleural effusion. These findings have progressed since  the previous exam. The right lung appears grossly clear. The pulmonary  vascularity is within normal limits. The visualized osseous structures are  unremarkable.     IMPRESSION  Impression: Increasing left lower lobe opacity as described. Assessment and Plan:   - Hypo-Osmolar Hyponatremia  - Alcoholic cirrhosis  - Abdominal distension  - Left lower lobe pneumonia          PLAN  · Plan to transfer to [facility] for further care and IR guided paracentesis. Paracentesis won't be done until 12/31/18  · Clinically better, continue with IV lasix, albumin and oral aldactone. · Net fluid balance negative 3.5 ltrs. · Continue IV rocephin and azithro, D2  · procal < 0.1     · Continue ding cath. Will give pyridium  · Monitor strict I&Os  · Sodium is improving, 122 today. SIADH r/o. Urine lytes and osmolality, suggestive of diuretic induced hyponatremia. Continue salt tablets  · US ab showed gross ascites with thickened GB wall as a sequela of portal venous HTN  · GI consult for today  · Ammonia level is 17. Continue lactulose  · Continue other home meds as reconciled in [facility]  · DVT prophylaxis with ESTHER hose  · Prn tylenol for mild pain/fever  · Prn zofran for nausea/vomiting  · Prn opioids for pain     Code Status: full  High risk     Anticipated discharge:  can be discharged home following paracentesis.     Signed By: Sadiq Ruiz MD     December 29, 2018

## 2018-12-29 NOTE — PROGRESS NOTES
Problem: Mobility Impaired (Adult and Pediatric) Goal: *Acute Goals and Plan of Care (Insert Text) DISCHARGE GOALS: 
(1.)Mr. Anne Sargent will move from supine to sit and sit to supine  in bed with INDEPENDENT within 5 treatment day(s). (2.)Mr. Anne Sargent will transfer from bed to chair and chair to bed with MODIFIED INDEPENDENCE using the least restrictive device within 5 treatment day(s). (3.)Mr. Anne Sargent will ambulate with MODIFIED INDEPENDENCE for 200 feet with the least restrictive device within 5 treatment day(s). ________________________________________________________________________________________________ PHYSICAL THERAPY: Initial Assessment 12/29/2018INPATIENT: Hospital Day: 2 Payor: Verta Ellis / Plan: [id number] / Product Type: Managed Care Medicare /  
  
NAME/AGE/GENDER: Hussein Jiménez is a 72 y.o. male PRIMARY DIAGNOSIS: Acute respiratory failure ([facility] [address] Hyponatremia Abdominal distension Cirrhosis ([facility] [address] Ascites Left lower lobe pneumonia ([facility] [address] <principal problem not specified> <principal problem not specified> 
 
  
ICD-10: Treatment Diagnosis:  
 · Generalized Muscle Weakness (M62.81) · Difficulty in walking, Not elsewhere classified (R26.2) Precaution/Allergies: 
Hydromet [hydrocodone-homatropine] ASSESSMENT:  
Mr. Anne Sargent presents with above diagnosis. Patient demonstrates generalized weakness affecting mobility. He reports a history of falls secondary to his L knee giving out. Patient just received a cane and plans on using it when he gets home. He also has access to a rolling walker. Patient would benefit from therapy to improve his strength and mobility. He would benefit from outpatient PT follow-up to further improve his mobility. This section established at most recent assessment PROBLEM LIST (Impairments causing functional limitations): 1. Decreased Strength 2. Decreased ADL/Functional Activities 3. Decreased Transfer Abilities 4. Decreased Ambulation Ability/Technique 5. Decreased Balance INTERVENTIONS PLANNED: (Benefits and precautions of physical therapy have been discussed with the patient.) 1. Bed Mobility 2. Family Education 3. Gait Training 4. Home Exercise Program (HEP) 5. Therapeutic Activites 6. Therapeutic Exercise/Strengthening TREATMENT PLAN: Frequency/Duration: daily for duration of hospital stay Rehabilitation Potential For Stated Goals: Good RECOMMENDED REHABILITATION/EQUIPMENT: (at time of discharge pending progress): Due to the probability of continued deficits (see above) this patient will likely need continued skilled physical therapy after discharge. Equipment:  
? None at this time HISTORY:  
History of Present Injury/Illness (Reason for Referral): 
Patient is a 72years old male with pmhx of alcoholic cirrhosis diagnosed in 07/2018, HTN, bronchitis presented with gradual worsening of abdominal distension and shortness of breath for past several weeks. Pt reports of generalized weakness, malaise for months, getting worse recently which is associated with progressive increase in abdominal girth and difficulty in breathing. He also endorses extensive bilateral pedal edema. He is compliant with lasix and aldactone. As per the wife, pt drinks a lot of water. He has had 2 paracentesis in [place]. He recently moved with his daughter in [place]. He is following up with GI at [facility]. He denies fever, chills, nausea/vomiting, diarrhea, melena/heatemesis, chest pain, cough, rhinorrhea/URI, sore throat. He does c/o lightheadedness/dizziness, abdominal discomfort. In ER, Na was 116, CXR showing left lower lobe haziness concerning for effusion vs infiltrates. He is saturating at 97% on room air.  
 
 
Past Medical History/Comorbidities:  
Mr. Paulo Kovacs  has a past medical history of Bronchitis, Cirrhosis of liver with ascites ([address] Hypertension, and Ulcer of ileum. Mr. Demario Oliva  has a past surgical history that includes hx orthopaedic; hx orthopaedic; and hx appendectomy. Social History/Living Environment:  
Home Environment: Private residence # Steps to Enter: 0 One/Two Story Residence: One story Living Alone: No 
Support Systems: Child(melissa), Family member(s), Spouse/Significant Other/Partner Patient Expects to be Discharged to[de-identified] Private residence Current DME Used/Available at Home: Aliyah Foster, quad, Shower chair, Walker, rolling Prior Level of Function/Work/Activity: 
Independent but history of falls. Number of Personal Factors/Comorbidities that affect the Plan of Care: 1-2: MODERATE COMPLEXITY EXAMINATION:  
Most Recent Physical Functioning:  
Gross Assessment: 
AROM: Generally decreased, functional 
Strength: Generally decreased, functional 
Coordination: Generally decreased, functional 
         
  
Posture: 
  
Balance: 
Sitting: Intact Standing: With support Bed Mobility: 
Supine to Sit: Stand-by assistance Scooting: Stand-by assistance Wheelchair Mobility: 
  
Transfers: 
Sit to Stand: Stand-by assistance Stand to Sit: Stand-by assistance Gait: 
  
Base of Support: Center of gravity altered Speed/Aleyda: Slow Step Length: Left shortened;Right shortened Distance (ft): 125 Feet (ft) Assistive Device: Cane, straight Ambulation - Level of Assistance: Stand-by assistance Interventions: Safety awareness training;Verbal cues Body Structures Involved: 1. Muscles Body Functions Affected: 1. Movement Related Activities and Participation Affected: 1. Mobility Number of elements that affect the Plan of Care: 3: MODERATE COMPLEXITY CLINICAL PRESENTATION:  
Presentation: Stable and uncomplicated: LOW COMPLEXITY CLINICAL DECISION MAKING:  
[employer] 6 Clicks Basic Mobility Inpatient Short Form How much difficulty does the patient currently have. .. Unable A Lot A Little None 1.  Turning over in bed (including adjusting bedclothes, sheets and blankets)? [] 1   [] 2   [x] 3   [] 4  
2. Sitting down on and standing up from a chair with arms ( e.g., wheelchair, bedside commode, etc.)   [] 1   [] 2   [x] 3   [] 4  
3. Moving from lying on back to sitting on the side of the bed? [] 1   [] 2   [x] 3   [] 4 How much help from another person does the patient currently need. .. Total A Lot A Little None 4. Moving to and from a bed to a chair (including a wheelchair)? [] 1   [] 2   [x] 3   [] 4  
5. Need to walk in hospital room? [] 1   [] 2   [x] 3   [] 4  
6. Climbing 3-5 steps with a railing? [] 1   [] 2   [x] 3   [] 4  
© 2007, Trustees of [employer], under license to [employer]. All rights reserved Score:  Initial: 18 Most Recent: X (Date: -- ) Interpretation of Tool:  Represents activities that are increasingly more difficult (i.e. Bed mobility, Transfers, Gait). Score 24 23 22-20 19-15 14-10 9-7 6 Modifier CH CI CJ CK CL CM CN   
 
? Mobility - Walking and Moving Around:  
  - CURRENT STATUS: CK - 40%-59% impaired, limited or restricted  - GOAL STATUS: CJ - 20%-39% impaired, limited or restricted  - D/C STATUS:  ---------------To be determined--------------- Payor: Bautista Corral / Plan: Sowmya Sorto / Product Type: [employer] Medicare /   
 
Medical Necessity:    
· Patient is expected to demonstrate progress in strength, balance and coordination to improve safety during activity. Reason for Services/Other Comments: 
· Patient continues to require skilled intervention due to generalized weakness affecting balance and mobility. Use of outcome tool(s) and clinical judgement create a POC that gives a: Clear prediction of patient's progress: LOW COMPLEXITY  
  
 
 
 
TREATMENT:  
(In addition to Assessment/Re-Assessment sessions the following treatments were rendered) Pre-treatment Symptoms/Complaints:  Patient agreeable to PT. Pain: Initial:  
Pain Intensity 1: 0  Post Session:  0 Assessment/Reassessment only, no treatment provided today Braces/Orthotics/Lines/Etc:  
· ding catheter · O2 Device: Room air Treatment/Session Assessment:   
· Response to Treatment:  Patient participated well. Reports he felt better with the cane. · Interdisciplinary Collaboration:  
o Physical Therapist 
o Registered Nurse · After treatment position/precautions:  
o Bed/Chair-wheels locked 
o Bed in low position 
o Caregiver at bedside 
o Call light within reach 
o Family at bedside 
o sitting edge of bed · Compliance with Program/Exercises: Compliant all of the time · Recommendations/Intent for next treatment session: \"Next visit will focus on advancements to more challenging activities and reduction in assistance provided\". Total Treatment Duration: PT Patient Time In/Time Out Time In: 1130 Time Out: 1145 Natalie Alfaro PT

## 2018-12-30 PROBLEM — K74.60 CIRRHOSIS (HCC): Chronic | Status: ACTIVE | Noted: 2018-01-01

## 2018-12-30 PROBLEM — J18.9 PNEUMONIA: Status: ACTIVE | Noted: 2018-01-01

## 2018-12-30 PROBLEM — J96.00 ACUTE RESPIRATORY FAILURE (HCC): Status: RESOLVED | Noted: 2018-01-01 | Resolved: 2018-01-01

## 2018-12-30 NOTE — PROGRESS NOTES
Visit with patient to build rapport with . Patient is calm. Receptive to  presence. Encouraged and assured patient of our continued care.     Ced Rees,  Staff   C: [phone]  /  [email or web address]

## 2018-12-30 NOTE — CONSULTS
[facility] Consult Note       Primary GI Physician: Dr. Urmila Kidd    Referring Provider:  Dr. Lore Cabello    Consult Date:  12/30/2018    Admit Date:  12/29/2018    Chief Complaint:  ascites    Subjective:     History of Present Illness:  Patient is a 72 y.o. male with PMH of EtOH cirrhosis, varices and ascites, who is seen in consultation at the request of Dr. Lore Cabello for ascites. He was diagnosed with cirrhosis secondary to EtOh in [place] last year. Last EtOH was 7/2018. He was admitted to [facility] 12/28/18 with worsening abdominal distention and shortness of breath for the last several weeks. In ER, he was hyponatremic with Na of 116 and CXR revealed LLL haziness concerning for effusion vs infiltrates and he was saturating at 97% on RA. Ultrasound on 12/28/18 showed cirrhotic appearing liver with 4 quadrant ascites, simple left renal cyst and thickening of GB wall which could be sequelae of portal hypertension. He is currently on aldactone 100 mg daily and Lasix 40 mg IV daily. Also receiving albumin infusion. Hgb 9.1, platelet count 183G, white count 4800, sodium today 121, BUN 13, creatinine 0.95, t. Bili 0.7, albumin 3, ALT 28, AST 31, alk phos 99. Patient reports having had two prior paracenteses--both in [place] and most recently in 8/2018 at which time 6 L of fluid was reportedly removed. He claims to adhere to low sodium diet and was on lasix 40 and aldactone 50 at home. He denies nausea, vomiting and abdominal pain although he complains of abdominal distention/tightness. Having diarrhea now on lactulose (which he was not on at home and denies h/o HE). Had 5 liquid stools yesterday. No hematochezia or melena. One BM this am.      Hist last EGD was in [place] in 6/2018 showing nonbleeding esophageal varices and PUD. His last colo was at the [place] in [place] about 8 years ago and normal per patient with 10 year follow up recommended.     PMH:  Past Medical History:   Diagnosis Date    Bronchitis     Cirrhosis of liver with ascites ([facility] [address]     Hypertension     Ulcer of ileum     ulcer       PSH:  Past Surgical History:   Procedure Laterality Date    HX APPENDECTOMY      HX ORTHOPAEDIC      carpal tunnel    HX ORTHOPAEDIC      back surgery       Allergies: Allergies   Allergen Reactions    Hydromet [Hydrocodone-Homatropine] Nausea Only     Dry heaves       Home Medications:  Prior to Admission medications    Medication Sig Start Date End Date Taking? Authorizing Provider   ferrous sulfate (IRON) 325 mg (65 mg iron) tablet Take 27 mg by mouth Daily (before breakfast). Other, MD Sophia   losartan (COZAAR) 50 mg tablet TAKE 1 TABLET BY MOUTH DAILY 11/20/18   Marifer Brody MD   doxazosin (CARDURA) 4 mg tablet Take 1 Tab by mouth daily. 11/19/18   Susan Kruger MD   pantoprazole (PROTONIX) 40 mg tablet Take 1 Tab by mouth daily. 11/19/18   Susan Kruger MD   gabapentin (NEURONTIN) 400 mg capsule Take 1 Cap by mouth three (3) times daily. Patient taking differently: Take 400 mg by mouth three (3) times daily. 11/12/18   Susan Kruger MD   furosemide (LASIX) 40 mg tablet Take 1 Tab by mouth daily. Patient taking differently: Take 80 mg by mouth daily. 10/22/18   Susan Kruger MD   spironolactone (ALDACTONE) 50 mg tablet Take 200 mg by mouth daily.     Provider, Historical       Hospital Medications:  Current Facility-Administered Medications   Medication Dose Route Frequency    acetaminophen (TYLENOL) tablet 650 mg  650 mg Oral Q6H PRN    morphine injection 1 mg  1 mg IntraVENous Q4H PRN    naloxone (NARCAN) injection 0.4 mg  0.4 mg IntraVENous PRN    sodium chloride (NS) flush 5-10 mL  5-10 mL IntraVENous Q8H    sodium chloride (NS) flush 5-10 mL  5-10 mL IntraVENous PRN    albumin human 25% (BUMINATE) solution 12.5 g  12.5 g IntraVENous Q6H    albuterol (PROVENTIL VENTOLIN) nebulizer solution 2.5 mg  2.5 mg Nebulization Q6H RT    azithromycin (ZITHROMAX) 500 mg in 0.9% sodium chloride (MBP/ADV) 250 mL  500 mg IntraVENous Q24H    cefTRIAXone (ROCEPHIN) 2 g in 0.9% sodium chloride (MBP/ADV) 50 mL  2 g IntraVENous Q24H    doxazosin (CARDURA) tablet 4 mg  4 mg Oral DAILY    furosemide (LASIX) injection 40 mg  40 mg IntraVENous DAILY    gabapentin (NEURONTIN) capsule 400 mg  400 mg Oral TID    guaiFENesin-dextromethorphan (ROBITUSSIN DM) 100-10 mg/5 mL syrup 5 mL  5 mL Oral Q6H PRN    lactulose (CHRONULAC) solution 30 g  30 g Oral TID    ondansetron (ZOFRAN) injection 4 mg  4 mg IntraVENous Q4H PRN    pantoprazole (PROTONIX) tablet 40 mg  40 mg Oral Q24H    phenazopyridine (PYRIDIUM) tab 95 mg  95 mg Oral TID    sodium chloride tablet 1 g  1 g Oral QID    spironolactone (ALDACTONE) tablet 100 mg  100 mg Oral DAILY       Social History:  Social History     Tobacco Use    Smoking status: Never Smoker    Smokeless tobacco: Never Used   Substance Use Topics    Alcohol use: No           Family History:  No family history of liver disease, colon cancer or polyps    Review of Systems:  A detailed 10 system ROS is obtained, with pertinent positives as listed above and in admission H&P. All others are negative. Diet:  1800 calorie    Objective:     Physical Exam:  Vitals:  Visit Vitals  /76   Pulse 78   Temp 97.9 °F (36.6 °C)   Resp 18   SpO2 94%     Gen:  Pt is alert, cooperative, no acute distress  Skin:  Extremities and face reveal no rashes. HEENT: Sclerae anicteric. Extra-occular muscles are intact. No oral ulcers. No abnormal pigmentation of the lips. The neck is supple. Cardiovascular: Regular rate and rhythm. No murmurs, gallops, or rubs. Respiratory:  Comfortable breathing with no accessory muscle use. Clear breath sounds anteriorly with no wheezes, rales, or rhonchi. GI:  Abdomen significantly distended and firm but not tense. Nontender  Nromal bowel sounds.   Tympanic anteriorly to percussion  Rectal:  Deferred  Musculoskeletal:  3+ JENELLE bilaterally  Neurological:  Gross memory appears intact. Patient is alert and oriented. Psychiatric:  Mood appears appropriate with judgement intact. Laboratory:    Recent Labs     12/30/18  0836 12/30/18  0528 12/29/18  1555 12/29/18  0713 12/28/18  1638 12/28/18  1244   WBC  --  4.8  --  4.9  --  5.8   HGB  --  9.1*  --  9.2*  --  10.8*   HCT  --  26.0*  --  26.4*  --  31.7*   PLT  --  118*  --  119*  --  144*   MCV  --  85.2  --  82.2  --  84.1   * 121* 122* 121* 118* 116*   K 3.8 3.8 4.1 4.1 4.8 4.9   CL 89* 89* 88* 87* 86* 83*   CO2 24 25 21 23 21 25   BUN 12 13 14 11 12 12   CREA 1.04 0.95 1.18 0.83 0.87 0.97   CA 8.0* 8.0* 7.8* 8.0* 8.2* 8.5   * 121* 139* 98 133* 133*   AP 99 99 111 97 123 125   SGOT 34 31 37 34 50* 48*   ALT 30 28 30 30 36 38   TBILI 0.9 0.7 0.8 1.0 1.3* 1.2*   ALB 3.3 3.0* 3.0* 2.8* 3.0* 2.9*   TP 6.6 6.2* 6.3 6.2 6.8 7.0          Assessment:     Principal Problem:    Left lower lobe pneumonia (HCC) (12/28/2018)    Active Problems:    Acute respiratory failure ([address] (12/28/2018)      Abdominal distension (12/28/2018)      Cirrhosis (HCC) (12/28/2018)      Hyponatremia (12/28/2018)      Ascites (12/28/2018)    73 y/o male with EtOH cirrhosis (abstinent since 7/2018) with ascites and EV is admitted with worsening abdominal distention/JENELLE, SOB with possible infiltrate on CXR, hyponatremia and 4 quadrant ascites on ultrasound. Plan:   1) agree with LVP. Anticipated for tomorrow, Monday  2) will discuss possibility of increasing diuretics dosage with Dr. Dot Artis. He is hyponatremic but normal renal function  3) change to 2 gram sodium diet  4) decrease lactulose secondary to diarrhea. Goal is 3 soft/loose stools per day. However, he denies any h/o HE. Is this needed? 5) check coags (INR 1.2 in 9/2018)    Patient is seen and examined in collaboration with Dr. Merlin Arts. Assessment and plan as per Dr. Dot Artis.   ROCIO Young

## 2018-12-30 NOTE — PROGRESS NOTES
Hospitalist Progress Note     Admit Date:  2018  6:00 PM   Name:  Lul Branch   Age:  72 y.o.  :  1945   MRN:  [id number]   PCP:  Fred Kong MD  Treatment Team: Attending Provider: Ash Locktet MD; Consulting Provider: ROCIO De Los Santos    Subjective:   Patient is a 72years old male with pmhx of alcoholic cirrhosis diagnosed in 2018, HTN, bronchitis admitted on  with hypo-osmolar hyponatremia with Na 116, along with ascites/abdominal distension and LLL PNA.  - pt reports abd soreness/tenseness worse today. Still SOB. No cough. No CP      Objective:     Patient Vitals for the past 24 hrs:   Temp Pulse Resp BP SpO2   18 0815 97.9 °F (36.6 °C) 78 18 118/76 94 %   18 0726 -- -- -- -- 96 %   18 0221 -- -- -- -- 94 %   18 2325 98.7 °F (37.1 °C) 82 18 119/66 95 %   18 2046 -- -- -- -- 96 %   18 1807 98.2 °F (36.8 °C) 81 18 115/62 96 %     Oxygen Therapy  O2 Sat (%): 94 % (18 0815)  Pulse via Oximetry: 76 beats per minute (18 0726)  O2 Device: Room air (18 0726)  No intake or output data in the 24 hours ending 18 1104    *Note that automatically entered I/Os may not be accurate; dependent on patient compliance with collection and accurate  by assistants. General:    Well nourished. Alert. CV:   RRR. No murmur, rub, or gallop. Lungs:   Decreased L base  Abdomen:   Semifirm, distended. No tenderness  Extremities: Warm and dry. No cyanosis. 3+ pitting edema BLE  Skin:     No rashes or jaundice.    Neuro:  No gross focal deficits    Data Review:  I have reviewed all labs, meds, telemetry events, and studies from the last 24 hours:    Recent Results (from the past 24 hour(s))   METABOLIC PANEL, COMPREHENSIVE    Collection Time: 18  3:55 PM   Result Value Ref Range    Sodium 122 (LL) 136 - 145 mmol/L    Potassium 4.1 3.5 - 5.1 mmol/L    Chloride 88 (L) 98 - 107 mmol/L    CO2 21 21 - 32 mmol/L    Anion gap 13 mmol/L    Glucose 139 (H) 65 - 100 mg/dL    BUN 14 8 - 23 MG/DL    Creatinine 1.18 0.8 - 1.5 MG/DL    GFR est AA >60 >60 ml/min/1.73m2    GFR est non-AA >60 ml/min/1.73m2    Calcium 7.8 (L) 8.3 - 10.4 MG/DL    Bilirubin, total 0.8 0.2 - 1.1 MG/DL    ALT (SGPT) 30 12 - 65 U/L    AST (SGOT) 37 15 - 37 U/L    Alk. phosphatase 111 50 - 136 U/L    Protein, total 6.3 g/dL    Albumin 3.0 (L) 3.2 - 4.6 g/dL    Globulin 3.3 2.3 - 3.5 g/dL    A-G Ratio 0.9     CBC W/O DIFF    Collection Time: 12/30/18  5:28 AM   Result Value Ref Range    WBC 4.8 4.3 - 11.1 K/uL    RBC 3.05 (L) 4.23 - 5.6 M/uL    HGB 9.1 (L) 13.6 - 17.2 g/dL    HCT 26.0 (L) 41.1 - 50.3 %    MCV 85.2 79.6 - 97.8 FL    MCH 29.8 26.1 - 32.9 PG    MCHC 35.0 31.4 - 35.0 g/dL    RDW 19.0 (H) 11.9 - 14.6 %    PLATELET 426 (L) 658 - 450 K/uL    MPV 11.1 9.4 - 12.3 FL    ABSOLUTE NRBC 0.00 0.0 - 0.2 K/uL   METABOLIC PANEL, COMPREHENSIVE    Collection Time: 12/30/18  5:28 AM   Result Value Ref Range    Sodium 121 (LL) 136 - 145 mmol/L    Potassium 3.8 3.5 - 5.1 mmol/L    Chloride 89 (L) 98 - 107 mmol/L    CO2 25 21 - 32 mmol/L    Anion gap 7 7 - 16 mmol/L    Glucose 121 (H) 65 - 100 mg/dL    BUN 13 8 - 23 MG/DL    Creatinine 0.95 0.8 - 1.5 MG/DL    GFR est AA >60 >60 ml/min/1.73m2    GFR est non-AA >60 >60 ml/min/1.73m2    Calcium 8.0 (L) 8.3 - 10.4 MG/DL    Bilirubin, total 0.7 0.2 - 1.1 MG/DL    ALT (SGPT) 28 12 - 65 U/L    AST (SGOT) 31 15 - 37 U/L    Alk.  phosphatase 99 50 - 136 U/L    Protein, total 6.2 (L) 6.3 - 8.2 g/dL    Albumin 3.0 (L) 3.2 - 4.6 g/dL    Globulin 3.2 2.3 - 3.5 g/dL    A-G Ratio 0.9 (L) 1.2 - 3.5     METABOLIC PANEL, COMPREHENSIVE    Collection Time: 12/30/18  8:36 AM   Result Value Ref Range    Sodium 121 (LL) 136 - 145 mmol/L    Potassium 3.8 3.5 - 5.1 mmol/L    Chloride 89 (L) 98 - 107 mmol/L    CO2 24 21 - 32 mmol/L    Anion gap 8 7 - 16 mmol/L    Glucose 173 (H) 65 - 100 mg/dL    BUN 12 8 - 23 MG/DL    Creatinine 1.04 0.8 - 1.5 MG/DL    GFR est AA >60 >60 ml/min/1.73m2    GFR est non-AA >60 >60 ml/min/1.73m2    Calcium 8.0 (L) 8.3 - 10.4 MG/DL    Bilirubin, total 0.9 0.2 - 1.1 MG/DL    ALT (SGPT) 30 12 - 65 U/L    AST (SGOT) 34 15 - 37 U/L    Alk. phosphatase 99 50 - 136 U/L    Protein, total 6.6 6.3 - 8.2 g/dL    Albumin 3.3 3.2 - 4.6 g/dL    Globulin 3.3 2.3 - 3.5 g/dL    A-G Ratio 1.0 (L) 1.2 - 3.5          All Micro Results     None          No results found for this visit on 12/29/18. Current Meds:  Current Facility-Administered Medications   Medication Dose Route Frequency    [START ON 12/31/2018] spironolactone (ALDACTONE) tablet 200 mg  200 mg Oral DAILY    [START ON 12/31/2018] furosemide (LASIX) tablet 40 mg  40 mg Oral DAILY    acetaminophen (TYLENOL) tablet 650 mg  650 mg Oral Q6H PRN    morphine injection 1 mg  1 mg IntraVENous Q4H PRN    naloxone (NARCAN) injection 0.4 mg  0.4 mg IntraVENous PRN    sodium chloride (NS) flush 5-10 mL  5-10 mL IntraVENous Q8H    sodium chloride (NS) flush 5-10 mL  5-10 mL IntraVENous PRN    albuterol (PROVENTIL VENTOLIN) nebulizer solution 2.5 mg  2.5 mg Nebulization Q6H RT    azithromycin (ZITHROMAX) 500 mg in 0.9% sodium chloride (MBP/ADV) 250 mL  500 mg IntraVENous Q24H    cefTRIAXone (ROCEPHIN) 2 g in 0.9% sodium chloride (MBP/ADV) 50 mL  2 g IntraVENous Q24H    doxazosin (CARDURA) tablet 4 mg  4 mg Oral DAILY    gabapentin (NEURONTIN) capsule 400 mg  400 mg Oral TID    guaiFENesin-dextromethorphan (ROBITUSSIN DM) 100-10 mg/5 mL syrup 5 mL  5 mL Oral Q6H PRN    lactulose (CHRONULAC) solution 30 g  30 g Oral TID    ondansetron (ZOFRAN) injection 4 mg  4 mg IntraVENous Q4H PRN    pantoprazole (PROTONIX) tablet 40 mg  40 mg Oral Q24H    phenazopyridine (PYRIDIUM) tab 95 mg  95 mg Oral TID    sodium chloride tablet 1 g  1 g Oral QID       Other Studies (last 24 hours):  No results found.     Assessment and Plan:     Hospital Problems as of 12/30/2018 Date Reviewed: 12/21/2018          Codes Class Noted - Resolved POA    Abdominal distension ICD-10-CM: R14.0  ICD-9-CM: 787.3  12/28/2018 - Present Yes        Cirrhosis ([facility] [address] (Chronic) ICD-10-CM: K74.60  ICD-9-CM: 571.5  12/28/2018 - Present Yes        * (Principal) Hyponatremia ICD-10-CM: E87.1  ICD-9-CM: 276.1  12/28/2018 - Present Yes        Ascites ICD-10-CM: R18.8  ICD-9-CM: 789.59  12/28/2018 - Present Yes        Left lower lobe pneumonia ([facility] ICD-10-CM: J18.1  ICD-9-CM: 832  12/28/2018 - Present Yes        RESOLVED: Acute respiratory failure ([facility] ICD-10-CM: J96.00  ICD-9-CM: 518.81  12/28/2018 - 12/30/2018 Yes              Plan:  · Ascites from cirrhosis-   · GI following  · LVP planned. It may be that he needs regularly scheduled paracenteses as outpatient to avoid hospital admissions. · Cont lasix, aldactone  · Defer lactulose to GI    · Hypervolemic HypoNa  · I am going to try a lasix drip as he is grossly overloaded. Monitor Na level and renal function. · Decrease cardura to make BP more permitting for diuresis  · Was started on Na tabs by admitter. Watch Na levels closely. · Cont ding for now, strict I/Os    · Possible LLL PNA  · Vs progressing effusion. Cont rocephin and azithro. · Follow up CXR as outpatient after discharge also    · HTN  · Well controlled.     · Reduced cardura to 2mg    DC planning/Dispo:  Home when improved  Diet:  DIET REGULAR  DVT ppx:  lovenox    Signed:  Cayla Castaneda MD

## 2018-12-31 NOTE — PROGRESS NOTES
Follow up note. According to chart, had 8.6L out via LVP. BP is lower now than it has been. Did get albumin. Will stop lasix.   I do not feel comfortable discharging him today despite his request.  Can go in AM if he remains stable

## 2018-12-31 NOTE — PROGRESS NOTES
IR Nurse Pre-Procedure Checklist Part 2          Consent signed: Yes    H&P complete:  Not Applicable    Antibiotics: Not applicable    Airway/Mallampati Done: Not applicable    Shaved: Yes    Pregnancy Form:Not applicable    Patient Position: Yes    MD Side: Yes     Biopsy Worksheet: Not applicable    Specimen Medium: Not applicable

## 2018-12-31 NOTE — PROGRESS NOTES
GI DAILY PROGRESS NOTE/Sign Off Note    Admit Date:  12/29/2018    Today's Date:  12/31/2018    CC:  Ascites    Subjective:     Patient was seen in IR. He had some nausea after medication this morning, with small amount of liquid emesis. He denies any bleeding. He had left sided abd sticking pain yesterday off and on, but not recurred today. He had 2 BMs this morning without bleeding. He is awaiting paracentesis. He states he also had a paracentesis in July and Sept in [place]. He saw Dr. Romayne Listen in Sept and has a pending follow up with him in Feb 2019. His family is at the bedside. Medications:   Current Facility-Administered Medications   Medication Dose Route Frequency    potassium chloride (K-DUR, KLOR-CON) SR tablet 40 mEq  40 mEq Oral DAILY    spironolactone (ALDACTONE) tablet 200 mg  200 mg Oral DAILY    lactulose (CHRONULAC) solution 30 g  30 g Oral BID    enoxaparin (LOVENOX) injection 40 mg  40 mg SubCUTAneous Q24H    furosemide (LASIX) 100 mg in 0.9% sodium chloride 100 mL infusion  10 mg/hr IntraVENous CONTINUOUS    doxazosin (CARDURA) tablet 2 mg  2 mg Oral DAILY    acetaminophen (TYLENOL) tablet 650 mg  650 mg Oral Q6H PRN    morphine injection 1 mg  1 mg IntraVENous Q4H PRN    naloxone (NARCAN) injection 0.4 mg  0.4 mg IntraVENous PRN    sodium chloride (NS) flush 5-10 mL  5-10 mL IntraVENous Q8H    sodium chloride (NS) flush 5-10 mL  5-10 mL IntraVENous PRN    albuterol (PROVENTIL VENTOLIN) nebulizer solution 2.5 mg  2.5 mg Nebulization Q6H RT    gabapentin (NEURONTIN) capsule 400 mg  400 mg Oral TID    guaiFENesin-dextromethorphan (ROBITUSSIN DM) 100-10 mg/5 mL syrup 5 mL  5 mL Oral Q6H PRN    ondansetron (ZOFRAN) injection 4 mg  4 mg IntraVENous Q4H PRN    pantoprazole (PROTONIX) tablet 40 mg  40 mg Oral Q24H    phenazopyridine (PYRIDIUM) tab 95 mg  95 mg Oral TID       Review of Systems:  ROS was obtained, with pertinent positives as listed above.   No chest pain or SOB.    Diet:  Regular, 2 gm Na+    Objective:   Vitals:  Visit Vitals  /66 (BP 1 Location: Left arm, BP Patient Position: At rest)   Pulse 82   Temp 97.5 °F (36.4 °C)   Resp 20   Wt 102.5 kg (225 lb 15.5 oz)   SpO2 92%   BMI 33.37 kg/m²     Intake/Output:  12/31 0701 - 12/31 1900  In: -   Out: 400 [Urine:400]  12/29 1901 - 12/31 0700  In: -   Out: 3800 [Urine:3800]  Exam:  General appearance: alert, cooperative, no distress, lying in bed in x-ray  Lungs: coarse BS to auscultation bilaterally anteriorly  Heart: regular rate and rhythm  Abdomen: distended with firmness, non-tender.  Bowel sounds normal. No masses, no organomegaly  Neuro:  alert and oriented x 3    Data Review (Labs):    Recent Labs     12/31/18  0549 12/30/18  1937 12/30/18  1155 12/30/18  0836 12/30/18  0528 12/29/18  1555 12/29/18  0713 12/28/18  1638 12/28/18  1244   WBC  --   --   --   --  4.8  --  4.9  --  5.8   HGB  --   --   --   --  9.1*  --  9.2*  --  10.8*   HCT  --   --   --   --  26.0*  --  26.4*  --  31.7*   PLT  --   --   --   --  118*  --  119*  --  144*   MCV  --   --   --   --  85.2  --  82.2  --  84.1   * 127* 122* 121* 121* 122* 121* 118* 116*   K 3.5  --   --  3.8 3.8 4.1 4.1 4.8 4.9   CL 92*  --   --  89* 89* 88* 87* 86* 83*   CO2 27  --   --  24 25 21 23 21 25   BUN 12  --   --  12 13 14 11 12 12   CREA 1.08  --   --  1.04 0.95 1.18 0.83 0.87 0.97   CA 7.8*  --   --  8.0* 8.0* 7.8* 8.0* 8.2* 8.5   *  --   --  173* 121* 139* 98 133* 133*   AP  --   --   --  99 99 111 97 123 125   SGOT  --   --   --  34 31 37 34 50* 48*   ALT  --   --   --  30 28 30 30 36 38   TBILI  --   --   --  0.9 0.7 0.8 1.0 1.3* 1.2*   ALB  --   --   --  3.3 3.0* 3.0* 2.8* 3.0* 2.9*   TP  --   --   --  6.6 6.2* 6.3 6.2 6.8 7.0   PTP  --   --  16.5*  --   --   --   --   --   --    INR  --   --  1.4  --   --   --   --   --   --      Assessment:     Principal Problem:    Hyponatremia (12/28/2018)    Active Problems:    Abdominal distension (12/28/2018)      Cirrhosis ([facility] [address] (12/28/2018)      Ascites (12/28/2018)      Left lower lobe pneumonia ([address] (12/28/2018)    73 yo male with PMH of EtOH cirrhosis (abstinent since 7/2018) with ascites and EV, who was seen in consultation 30 Dec 2018 for ascites, after admitted with worsening abdominal distention/JENELLE, SOB with possible infiltrate on CXR, hyponatremia, and 4 quadrant ascites on ultrasound. His lasix 40mg daily and aldactone 50mg daily were just recently increased to 80mg daily and 200mg daily, though he had not started those doses prior to admission. His last LVP was in Sept 2018, prior to moving here from [facility]. Paracentesis pending today. Na+ is improving. Plan:     -Supportive care, maintain electrolytes. -Paracentesis pending.  -2 gm Na+ diet. -Decrease lactulose secondary to diarrhea - goal is 3 soft/loose stools per day. However, he denies any h/o HE. Is this needed?  -Will need to increase his diuretics, though would wait to ensure his Na improves first.  -Follow.  -He has pending outpatient follow up with Dr. Janice Soto in Feb 2019. Patient is seen and examined in collaboration with Dr. Anette Baumgarten. Assessment and plan as per Dr. Nancy Powell. ROCIO Valladares-C  [facility]    I have seen and examined the patient, and I have directed and agreed to the plan as described. The patient has cirrhosis with ascites. He presented with severe hyponatremia that has improved. I am comfortable with discharge. I recommend that he be discharged on his baseline dose of diuretics due to the hyponatremia, not increase the doses. I discussed both 2g Na restriction and 1.5L H20 restriction (due to hyponatremia). He is on lactulose for unclear reasons. There is no h/o confusion or a diagnosis of hepatic encephalopathy, and lactulose is indicated for the treatment of this condition, not prevention.   Also, he has excessive diarrhea as a result of lactulose, so I am stopping this.    He sees Dr. Rodriguez Conley and has follow up scheduled. We will be available as needed.     Fiona Wilkes MD

## 2018-12-31 NOTE — PROGRESS NOTES
Problem: Falls - Risk of  Goal: *Absence of Falls  Document Nithya Fall Risk and appropriate interventions in the flowsheet.   Outcome: Progressing Towards Goal  Fall Risk Interventions:  Mobility Interventions: Bed/chair exit alarm, Patient to call before getting OOB         Medication Interventions: Bed/chair exit alarm, Patient to call before getting OOB, Teach patient to arise slowly    Elimination Interventions: Bed/chair exit alarm, Call light in reach

## 2018-12-31 NOTE — PROGRESS NOTES
Problem: Mobility Impaired (Adult and Pediatric)  Goal: *Acute Goals and Plan of Care (Insert Text)  ST. Patient will perform bed mobility with INDEPENDENCE within 3 days. 2. Patient will transfer bed to chair with STAND BY ASSISTANCE within 3 days. 3. Patient will ambulate 150+ using least restrictive assistive device and STAND BY ASSISTANCE within 3 days. 4. Patient will tolerate 15+ minutes of therapeutic activity/exercise and/or neuromuscular re-education while maintaining stable vitals to improve functional strength and activity tolerance within 3 days. LT. Patient will transfer bed to chair with MODIFIED INDEPENDENCE within 7 days. 2. Patient will demonstrate FAIR+ DYNAMIC STANDING balance within 7 day(s). 3. Patient will ambulate 300+ using least restrictive assistive device and STAND BY ASSISTANCE within 7 days. 4. Patient will tolerate 25+ minutes of therapeutic activity/exercise and/or neuromuscular re-education while maintaining stable vitals to improve functional strength and activity tolerance within 7 days. PHYSICAL THERAPY: Re-evaluation, Treatment Day: Day of Assessment, AM 2018  INPATIENT: Hospital Day: 3  Payor: Tabitha Seip / Plan: Rosalva Schirmer / Product Type: [employer] /      NAME/AGE/GENDER: Clarisa Son is a 72 y.o. male   PRIMARY DIAGNOSIS: cirrhosis  Pneumonia Hyponatremia Hyponatremia       ICD-10: Treatment Diagnosis:    · Generalized Muscle Weakness (M62.81)  · Difficulty in walking, Not elsewhere classified (R26.2)   Precaution/Allergies:  Hydromet [hydrocodone-homatropine]      ASSESSMENT:     Mr. Tammie Tolentino is a 72year old male admitted with cirrhosis, pneumonia, and hyponatremia. Patient seen this AM for physical therapy re-evaluation: presents supine in bed, oriented x4, and endorses 0/10 pain.  Patient lives with his spouse in a single story residence where he endorses independence with ADLs, ambulates with a SPC, and endorses recent frequent falls, attributing them to B LE swelling. Today, generalized weakness appreciated in B LEs 4/5, decreased ankle ROM secondary to B LE pitting edema, and sensation remains intact to light touch B LEs. Patient performed bed mobility with SBA, transfers with CGA, and ambulation with within room to chair x30ft with RW and close CGA. Demonstrated a slow, step-to gait pattern with decreased dynamic balance and decreased B LE foot placement coordination. Educated patient on elevation of B LEs as well as ankle pumps/circles with teach back method. Mr. Moo Berger presents with decreased functional mobility and balance/gait status from baseline. Recommend continued skilled PT services to address stated deficits. Will follow and progress toward stated goals during acute stay. This section established at most recent assessment   PROBLEM LIST (Impairments causing functional limitations):  1. Decreased Strength  2. Decreased Transfer Abilities  3. Decreased Ambulation Ability/Technique  4. Decreased Balance  5. Decreased Activity Tolerance  6. Decreased Flexibility/Joint Mobility  7. Decreased Knowledge of Precautions  8. Decreased Gooding with Home Exercise Program   INTERVENTIONS PLANNED: (Benefits and precautions of physical therapy have been discussed with the patient.)  1. Balance Exercise  2. Bed Mobility  3. Gait Training  4. Group Therapy  5. Home Exercise Program (HEP)  6. Range of Motion (ROM)  7. Therapeutic Activites  8. Therapeutic Exercise/Strengthening  9. Transfer Training     TREATMENT PLAN: Frequency/Duration: 3 times a week for duration of hospital stay  Rehabilitation Potential For Stated Goals: Good     RECOMMENDED REHABILITATION/EQUIPMENT: (at time of discharge pending progress): Due to the probability of continued deficits (see above) this patient will likely need continued skilled physical therapy after discharge.   Equipment:    None at this time HISTORY:   History of Present Injury/Illness (Reason for Referral):  Weakness; Difficulty in walking  Past Medical History/Comorbidities:   Mr. Sidra Pablo  has a past medical history of Bronchitis, Cirrhosis of liver with ascites ([address] Hypertension, and Ulcer of ileum. Mr. Sidra Pablo  has a past surgical history that includes hx orthopaedic; hx orthopaedic; and hx appendectomy. Social History/Living Environment:   Home Environment: Private residence  One/Two Story Residence: One story  Living Alone: No  Support Systems: Spouse/Significant Other/Partner  Patient Expects to be Discharged to[de-identified] Private residence  Current DME Used/Available at Home: Charmaine Spark, straight, Walker, rolling  Prior Level of Function/Work/Activity:  Patient lives with his spouse in a single story residence where he endorses independence with ADLs, ambulates with a SPC, and endorses recent frequent falls, attributing them to B LE swelling. Number of Personal Factors/Comorbidities that affect the Plan of Care: 0: LOW COMPLEXITY   EXAMINATION:   Most Recent Physical Functioning:   Gross Assessment:  AROM: Generally decreased, functional  Strength: Generally decreased, functional  Coordination: Generally decreased, functional  Sensation: Intact               Posture:  Posture (WDL): Exceptions to WDL  Posture Assessment: Forward head  Balance:  Sitting: Intact  Standing: Impaired  Standing - Static: Fair  Standing - Dynamic : Fair Bed Mobility:  Supine to Sit: Stand-by assistance  Scooting: Stand-by assistance  Wheelchair Mobility:     Transfers:  Sit to Stand: Contact guard assistance  Stand to Sit: Contact guard assistance  Bed to Chair: Contact guard assistance  Gait:     Base of Support: Center of gravity altered  Speed/Aleyda: Shuffled; Slow  Step Length: Right shortened;Left shortened  Gait Abnormalities: Decreased step clearance;Shuffling gait; Step to gait;Trunk sway increased  Distance (ft): 20 Feet (ft)  Assistive Device: Tena Yuen rolling  Ambulation - Level of Assistance: Contact guard assistance  Interventions: Safety awareness training; Tactile cues; Verbal cues      Body Structures Involved:  1. Metabolic  2. Muscles Body Functions Affected:  1. Movement Related Activities and Participation Affected:  1. Mobility  2. Self Care   Number of elements that affect the Plan of Care: 4+: HIGH COMPLEXITY   CLINICAL PRESENTATION:   Presentation: Evolving clinical presentation with changing clinical characteristics: MODERATE COMPLEXITY   CLINICAL DECISION MAKIN [facility] Inpatient Short Form  How much difficulty does the patient currently have. .. Unable A Lot A Little None   1. Turning over in bed (including adjusting bedclothes, sheets and blankets)? [] 1   [] 2   [] 3   [x] 4   2. Sitting down on and standing up from a chair with arms ( e.g., wheelchair, bedside commode, etc.)   [] 1   [] 2   [x] 3   [] 4   3. Moving from lying on back to sitting on the side of the bed? [] 1   [] 2   [x] 3   [] 4   How much help from another person does the patient currently need. .. Total A Lot A Little None   4. Moving to and from a bed to a chair (including a wheelchair)? [] 1   [] 2   [x] 3   [] 4   5. Need to walk in hospital room? [] 1   [] 2   [x] 3   [] 4   6. Climbing 3-5 steps with a railing? [] 1   [] 2   [x] 3   [] 4   © , Trustees of [address] [place] [address], under license to [employer]. All rights reserved      Score:  Initial: 19 Most Recent: 19 (Date: 18)    Interpretation of Tool:  Represents activities that are increasingly more difficult (i.e. Bed mobility, Transfers, Gait). Score 24 23 22-20 19-15 14-10 9-7 6     Modifier CH CI CJ CK CL CM CN      ?  Mobility - Walking and Moving Around:     - CURRENT STATUS: CK - 40%-59% impaired, limited or restricted    - GOAL STATUS: CI - 1%-19% impaired, limited or restricted    - D/C STATUS:  ---------------To be determined---------------  Payor: Yasmany Arvizu / Plan: Ed Im / Product Type: Managed Care Medicare /      Medical Necessity:     · Patient demonstrates good rehab potential due to higher previous functional level. Reason for Services/Other Comments:  · Patient continues to require skilled intervention due to decreased functional mobility and functional activity tolerance from baseline. Use of outcome tool(s) and clinical judgement create a POC that gives a: Clear prediction of patient's progress: LOW COMPLEXITY            TREATMENT:   (In addition to Assessment/Re-Assessment sessions the following treatments were rendered)   Pre-treatment Symptoms/Complaints:    Pain: Initial:   Pain Intensity 1: 0  Post Session:  0/10     Initial Evaluation (10 Minutes)  Therapeutic Activity: (    10 Minutes): Therapeutic activities including bed mobility, transfer training, balance training, safety awareness training, ambulation on level ground x30ft, and patient education to improve mobility, strength and balance. Required minimal Safety awareness training; Tactile cues; Verbal cues to promote static and dynamic balance in standing. Braces/Orthotics/Lines/Etc:   · IV  · ding catheter  · O2 Device: Room air  Treatment/Session Assessment:    · Response to Treatment:  See above. · Interdisciplinary Collaboration:   o Physical Therapist  o Registered Nurse  · After treatment position/precautions:   o Up in chair  o Bed in low position  o Call light within reach  o RN notified  o Family at bedside  o RN notified of patient status; spouse attending at bedside; needs within reach; educated patient on falls precautions and call for assistance; verbalized understanding   · Compliance with Program/Exercises: Will assess as treatment progresses  · Recommendations/Intent for next treatment session:   \"Next visit will focus on advancements to more challenging activities and reduction in assistance provided\".     Total Treatment Duration:  PT Patient Time In/Time Out  Time In: 0910  Time Out: 200 [facility], T

## 2018-12-31 NOTE — PROGRESS NOTES
Occupational Therapy Note:  OT orders received, chart reviewed, and re-evaluation attempted. Patient off the floor to IR at this time. Will check back as schedule permits and patient is available to initiate occupational therapy evaluation.    Thank you for this consult,   Kassandra Gaston, OTR/L

## 2018-12-31 NOTE — PROGRESS NOTES
Interventional Radiology Post Paracentesis Note    12/31/2018    Procedure(s): Ultrasound guided Therapeutic Paracentesis Performed with 8 [place] catheter total volume 8600 ml. Preliminary Findings: medium yellow and cloudy. Complications: None    Plan:  Observation, check labs if drawn.           Chest X-Ray:  no    Full dictated report to follow    Signed By: Annabella Rios RN

## 2018-12-31 NOTE — PROGRESS NOTES
Hospitalist Progress Note     Admit Date:  2018  6:00 PM   Name:  Patrick Wright   Age:  72 y.o.  :  1945   MRN:  [id number]   PCP:  Roberto Chahal MD  Treatment Team: Attending Provider: Jeimy Wayne MD; Consulting Provider: ROCIO Barber; Utilization Review: Binh Lemons RN    Subjective:   Patient is a 72years old male with pmhx of alcoholic cirrhosis diagnosed in 2018, HTN, bronchitis admitted on  with hypo-osmolar hyponatremia with Na 116, along with ascites/abdominal distension and LLL PNA.  - Seen/examined. Pt wants to go home. Says he feels better. HypoNa was hypervolemic, is improving with lasix gtt and he is diuresing. He likely does need higher lasix and aldactone dose at discharge along with KCl and close follow up.   If his Na improves at next check, his BP and other vitals remain stable after LVP, I would be more comfortable with discharge today; but most likely will need to stay another day if he is agreeable      Objective:     Patient Vitals for the past 24 hrs:   Temp Pulse Resp BP SpO2   18 0739 97.5 °F (36.4 °C) 82 20 115/66 92 %   18 0713 -- -- -- -- 92 %   18 0433 98.7 °F (37.1 °C) 81 18 112/71 91 %   18 0151 -- -- -- -- 95 %   18 2319 98.6 °F (37 °C) 81 18 107/71 --   18 2048 -- -- -- -- 97 %   18 2021 98.5 °F (36.9 °C) 82 18 110/66 92 %   18 1630 97.9 °F (36.6 °C) 81 18 114/67 94 %   18 1432 -- -- -- -- 94 %   18 1230 98.1 °F (36.7 °C) 82 18 108/72 95 %     Oxygen Therapy  O2 Sat (%): 92 % (18 0739)  Pulse via Oximetry: 75 beats per minute (18)  O2 Device: Room air (18)    Intake/Output Summary (Last 24 hours) at 2018 1016  Last data filed at 2018 1009  Gross per 24 hour   Intake --   Output 4200 ml   Net -4200 ml       *Note that automatically entered I/Os may not be accurate; dependent on patient compliance with collection and accurate  by assistants. General:    Well nourished. Alert. CV:   RRR. No murmur, rub, or gallop. Lungs:   Decreased L base  Abdomen:   Semifirm, distended. No tenderness  Extremities: Warm and dry. No cyanosis. 3+ pitting edema BLE  Skin:     No rashes or jaundice. Neuro:  No gross focal deficits    Data Review:  I have reviewed all labs, meds, telemetry events, and studies from the last 24 hours:    Recent Results (from the past 24 hour(s))   SODIUM    Collection Time: 12/30/18 11:55 AM   Result Value Ref Range    Sodium 122 (LL) 136 - 145 mmol/L   PROTHROMBIN TIME + INR    Collection Time: 12/30/18 11:55 AM   Result Value Ref Range    Prothrombin time 16.5 (H) 11.7 - 14.5 sec    INR 1.4     SODIUM    Collection Time: 12/30/18  7:37 PM   Result Value Ref Range    Sodium 127 (L) 136 - 175 mmol/L   METABOLIC PANEL, BASIC    Collection Time: 12/31/18  5:49 AM   Result Value Ref Range    Sodium 126 (L) 136 - 145 mmol/L    Potassium 3.5 3.5 - 5.1 mmol/L    Chloride 92 (L) 98 - 107 mmol/L    CO2 27 21 - 32 mmol/L    Anion gap 7 7 - 16 mmol/L    Glucose 131 (H) 65 - 100 mg/dL    BUN 12 8 - 23 MG/DL    Creatinine 1.08 0.8 - 1.5 MG/DL    GFR est AA >60 >60 ml/min/1.73m2    GFR est non-AA >60 >60 ml/min/1.73m2    Calcium 7.8 (L) 8.3 - 10.4 MG/DL        All Micro Results     None          No results found for this visit on 12/29/18.     Current Meds:  Current Facility-Administered Medications   Medication Dose Route Frequency    potassium chloride (K-DUR, KLOR-CON) SR tablet 40 mEq  40 mEq Oral DAILY    spironolactone (ALDACTONE) tablet 200 mg  200 mg Oral DAILY    lactulose (CHRONULAC) solution 30 g  30 g Oral BID    enoxaparin (LOVENOX) injection 40 mg  40 mg SubCUTAneous Q24H    furosemide (LASIX) 100 mg in 0.9% sodium chloride 100 mL infusion  10 mg/hr IntraVENous CONTINUOUS    doxazosin (CARDURA) tablet 2 mg  2 mg Oral DAILY    acetaminophen (TYLENOL) tablet 650 mg  650 mg Oral Q6H PRN    morphine injection 1 mg  1 mg IntraVENous Q4H PRN    naloxone (NARCAN) injection 0.4 mg  0.4 mg IntraVENous PRN    sodium chloride (NS) flush 5-10 mL  5-10 mL IntraVENous Q8H    sodium chloride (NS) flush 5-10 mL  5-10 mL IntraVENous PRN    albuterol (PROVENTIL VENTOLIN) nebulizer solution 2.5 mg  2.5 mg Nebulization Q6H RT    azithromycin (ZITHROMAX) 500 mg in 0.9% sodium chloride (MBP/ADV) 250 mL  500 mg IntraVENous Q24H    cefTRIAXone (ROCEPHIN) 2 g in 0.9% sodium chloride (MBP/ADV) 50 mL  2 g IntraVENous Q24H    gabapentin (NEURONTIN) capsule 400 mg  400 mg Oral TID    guaiFENesin-dextromethorphan (ROBITUSSIN DM) 100-10 mg/5 mL syrup 5 mL  5 mL Oral Q6H PRN    ondansetron (ZOFRAN) injection 4 mg  4 mg IntraVENous Q4H PRN    pantoprazole (PROTONIX) tablet 40 mg  40 mg Oral Q24H    phenazopyridine (PYRIDIUM) tab 95 mg  95 mg Oral TID    sodium chloride tablet 1 g  1 g Oral QID       Other Studies (last 24 hours):  No results found. Assessment and Plan:     Hospital Problems as of 12/31/2018 Date Reviewed: 12/21/2018          Codes Class Noted - Resolved POA    Abdominal distension ICD-10-CM: R14.0  ICD-9-CM: 787.3  12/28/2018 - Present Yes        Cirrhosis ([facility] (Chronic) ICD-10-CM: K74.60  ICD-9-CM: 571.5  12/28/2018 - Present Yes        * (Principal) Hyponatremia ICD-10-CM: E87.1  ICD-9-CM: 276.1  12/28/2018 - Present Yes        Ascites ICD-10-CM: R18.8  ICD-9-CM: 789.59  12/28/2018 - Present Yes        Left lower lobe pneumonia ([facility] ICD-10-CM: J18.1  ICD-9-CM: 323  12/28/2018 - Present Yes        RESOLVED: Acute respiratory failure ([facility] ICD-10-CM: J96.00  ICD-9-CM: 518.81  12/28/2018 - 12/30/2018 Yes              Plan:  · Ascites from cirrhosis-   · GI following  · LVP today  · Cont lasix gtt, aldactone  · Defer lactulose to GI    · Hypervolemic HypoNa  · Cont lasix gtt  · Decreased cardura 12/30 to make BP more permitting for diuresis  · Was started on Na tabs by admitter.   Will stop  · Cont toña for now, strict I/Os    · Possible LLL PNA  · I doubt infection. More likely effusion. Stop abx. PCT was wnl    · HTN  · Well controlled.     · Reduced cardura to 2mg 12/30    DC planning/Dispo:  Home when improved  Diet:  DIET REGULAR  DVT ppx:  lovenox    Signed:  Delight Hodgkin, MD

## 2018-12-31 NOTE — PROGRESS NOTES
Therapy recommends outpt therapy to be setup at time of DC pt agreeable to outpt PT/OT with [address] [place] [address] [facility], orders faxed to centralized scheduling.

## 2018-12-31 NOTE — PROGRESS NOTES
TRANSFER - OUT REPORT:    Verbal report given to ZENAIDA Squires on Yuliya Walker  being transferred [phone] for routine progression of care       Report consisted of patients Situation, Background, Assessment and   Recommendations(SBAR). Information from the following report(s) SBAR, Procedure Summary and Intake/Output was reviewed with the receiving nurse. Lines:   Peripheral IV 12/28/18 Right Antecubital (Active)   Site Assessment Clean, dry, & intact 12/30/2018  8:31 PM   Phlebitis Assessment 0 12/30/2018  8:31 PM   Infiltration Assessment 0 12/30/2018  8:31 PM   Dressing Status Clean, dry, & intact 12/30/2018  8:31 PM   Dressing Type Transparent;Tape 12/30/2018  8:31 PM   Hub Color/Line Status Flushed;Patent 12/30/2018  8:31 PM       Peripheral IV 12/28/18 Left Antecubital (Active)   Site Assessment Clean, dry, & intact 12/30/2018  8:31 PM   Phlebitis Assessment 0 12/30/2018  8:31 PM   Infiltration Assessment 0 12/30/2018  8:31 PM   Dressing Status Clean, dry, & intact 12/30/2018  8:31 PM   Dressing Type Transparent;Tape 12/30/2018  8:31 PM   Hub Color/Line Status Flushed;Patent 12/30/2018  8:31 PM   Action Taken Blood drawn 12/28/2018  2:05 PM       Peripheral IV 12/28/18 Left Forearm (Active)   Site Assessment Clean, dry, & intact 12/30/2018  8:31 PM   Phlebitis Assessment 0 12/30/2018  8:31 PM   Infiltration Assessment 0 12/30/2018  8:31 PM   Dressing Status Clean, dry, & intact 12/30/2018  8:31 PM   Dressing Type Transparent;Tape 12/30/2018  8:31 PM   Hub Color/Line Status Patent; Flushed 12/30/2018  8:31 PM   Action Taken Blood drawn 12/28/2018  2:06 PM        Opportunity for questions and clarification was provided. ZENAIDA Squires verbalized understanding for Albumin orders and to hold today's scheduled Lovenox. Patient transported with:   Tech  Chart   Family at bedside.

## 2019-01-01 ENCOUNTER — HOSPITAL ENCOUNTER (OUTPATIENT)
Dept: INTERVENTIONAL RADIOLOGY/VASCULAR | Age: 74
Discharge: HOME | End: 2019-05-02
Attending: PHYSICIAN ASSISTANT
Payer: COMMERCIAL

## 2019-01-01 ENCOUNTER — APPOINTMENT (OUTPATIENT)
Dept: MRI IMAGING | Age: 74
DRG: 682 | End: 2019-01-01
Attending: HOSPITALIST
Payer: COMMERCIAL

## 2019-01-01 ENCOUNTER — PATIENT OUTREACH (OUTPATIENT)
Dept: CASE MANAGEMENT | Age: 74
End: 2019-01-01

## 2019-01-01 ENCOUNTER — APPOINTMENT (OUTPATIENT)
Dept: GENERAL RADIOLOGY | Age: 74
End: 2019-01-01
Attending: EMERGENCY MEDICINE
Payer: OTHER MISCELLANEOUS

## 2019-01-01 ENCOUNTER — HOSPITAL ENCOUNTER (OUTPATIENT)
Dept: INTERVENTIONAL RADIOLOGY/VASCULAR | Age: 74
Discharge: HOME | End: 2019-06-26
Attending: NURSE PRACTITIONER
Payer: COMMERCIAL

## 2019-01-01 ENCOUNTER — HOSPITAL ENCOUNTER (OUTPATIENT)
Dept: INTERVENTIONAL RADIOLOGY/VASCULAR | Age: 74
Discharge: HOME | End: 2019-02-08
Attending: PHYSICIAN ASSISTANT
Payer: COMMERCIAL

## 2019-01-01 ENCOUNTER — HOSPITAL ENCOUNTER (OUTPATIENT)
Dept: LAB | Age: 74
Discharge: HOME | End: 2019-04-23
Payer: COMMERCIAL

## 2019-01-01 ENCOUNTER — APPOINTMENT (OUTPATIENT)
Dept: GENERAL RADIOLOGY | Age: 74
DRG: 433 | End: 2019-01-01
Attending: EMERGENCY MEDICINE
Payer: COMMERCIAL

## 2019-01-01 ENCOUNTER — APPOINTMENT (OUTPATIENT)
Dept: GENERAL RADIOLOGY | Age: 74
DRG: 433 | End: 2019-01-01
Attending: INTERNAL MEDICINE
Payer: COMMERCIAL

## 2019-01-01 ENCOUNTER — HOSPITAL ENCOUNTER (OUTPATIENT)
Dept: INTERVENTIONAL RADIOLOGY/VASCULAR | Age: 74
Discharge: HOME | End: 2019-02-06
Attending: PHYSICIAN ASSISTANT

## 2019-01-01 ENCOUNTER — HOSPITAL ENCOUNTER (OUTPATIENT)
Dept: INTERVENTIONAL RADIOLOGY/VASCULAR | Age: 74
Discharge: HOME | End: 2019-05-13
Attending: PHYSICIAN ASSISTANT
Payer: COMMERCIAL

## 2019-01-01 ENCOUNTER — HOSPITAL ENCOUNTER (OUTPATIENT)
Dept: LAB | Age: 74
Discharge: HOME | End: 2019-02-28
Attending: PHYSICIAN ASSISTANT
Payer: COMMERCIAL

## 2019-01-01 ENCOUNTER — HOSPITAL ENCOUNTER (OUTPATIENT)
Dept: CT IMAGING | Age: 74
Discharge: HOME | End: 2019-01-24
Attending: NURSE PRACTITIONER
Payer: COMMERCIAL

## 2019-01-01 ENCOUNTER — HOSPITAL ENCOUNTER (EMERGENCY)
Age: 74
Discharge: HOME | End: 2019-09-22
Attending: EMERGENCY MEDICINE | Admitting: EMERGENCY MEDICINE
Payer: OTHER MISCELLANEOUS

## 2019-01-01 ENCOUNTER — APPOINTMENT (OUTPATIENT)
Dept: INTERVENTIONAL RADIOLOGY/VASCULAR | Age: 74
DRG: 433 | End: 2019-01-01
Attending: INTERNAL MEDICINE
Payer: COMMERCIAL

## 2019-01-01 ENCOUNTER — APPOINTMENT (OUTPATIENT)
Dept: INTERVENTIONAL RADIOLOGY/VASCULAR | Age: 74
DRG: 682 | End: 2019-01-01
Attending: INTERNAL MEDICINE
Payer: COMMERCIAL

## 2019-01-01 ENCOUNTER — HOSPITAL ENCOUNTER (OUTPATIENT)
Dept: INTERVENTIONAL RADIOLOGY/VASCULAR | Age: 74
Discharge: HOME | End: 2019-07-10
Attending: NURSE PRACTITIONER
Payer: COMMERCIAL

## 2019-01-01 ENCOUNTER — APPOINTMENT (OUTPATIENT)
Dept: CT IMAGING | Age: 74
End: 2019-01-01
Attending: EMERGENCY MEDICINE
Payer: OTHER MISCELLANEOUS

## 2019-01-01 ENCOUNTER — HOSPITAL ENCOUNTER (OUTPATIENT)
Dept: LAB | Age: 74
Discharge: HOME | End: 2019-02-05
Payer: COMMERCIAL

## 2019-01-01 ENCOUNTER — HOSPITAL ENCOUNTER (OUTPATIENT)
Dept: INTERVENTIONAL RADIOLOGY/VASCULAR | Age: 74
Discharge: HOME | DRG: 682 | End: 2019-03-25
Attending: PHYSICIAN ASSISTANT
Payer: COMMERCIAL

## 2019-01-01 ENCOUNTER — HOSPITAL ENCOUNTER (EMERGENCY)
Age: 74
Discharge: HOME | End: 2019-09-10
Attending: EMERGENCY MEDICINE
Payer: OTHER MISCELLANEOUS

## 2019-01-01 ENCOUNTER — HOSPITAL ENCOUNTER (OUTPATIENT)
Dept: ULTRASOUND IMAGING | Age: 74
Discharge: HOME | DRG: 682 | End: 2019-03-25
Attending: PHYSICIAN ASSISTANT
Payer: COMMERCIAL

## 2019-01-01 ENCOUNTER — APPOINTMENT (OUTPATIENT)
Dept: ULTRASOUND IMAGING | Age: 74
End: 2019-01-01
Attending: EMERGENCY MEDICINE
Payer: OTHER MISCELLANEOUS

## 2019-01-01 ENCOUNTER — APPOINTMENT (OUTPATIENT)
Dept: CT IMAGING | Age: 74
DRG: 682 | End: 2019-01-01
Attending: HOSPITALIST
Payer: COMMERCIAL

## 2019-01-01 ENCOUNTER — HOSPITAL ENCOUNTER (OUTPATIENT)
Dept: INTERVENTIONAL RADIOLOGY/VASCULAR | Age: 74
Discharge: HOME | End: 2019-03-06
Attending: PHYSICIAN ASSISTANT
Payer: COMMERCIAL

## 2019-01-01 ENCOUNTER — APPOINTMENT (OUTPATIENT)
Dept: ULTRASOUND IMAGING | Age: 74
DRG: 433 | End: 2019-01-01
Attending: INTERNAL MEDICINE
Payer: COMMERCIAL

## 2019-01-01 ENCOUNTER — HOSPITAL ENCOUNTER (OUTPATIENT)
Dept: INTERVENTIONAL RADIOLOGY/VASCULAR | Age: 74
Discharge: HOME | End: 2019-02-21
Attending: PHYSICIAN ASSISTANT
Payer: COMMERCIAL

## 2019-01-01 ENCOUNTER — APPOINTMENT (OUTPATIENT)
Dept: ULTRASOUND IMAGING | Age: 74
End: 2019-01-01
Attending: PHYSICIAN ASSISTANT
Payer: COMMERCIAL

## 2019-01-01 ENCOUNTER — HOSPITAL ENCOUNTER (OUTPATIENT)
Age: 74
Setting detail: OUTPATIENT SURGERY
Discharge: HOME | End: 2019-02-05
Attending: INTERNAL MEDICINE | Admitting: INTERNAL MEDICINE
Payer: COMMERCIAL

## 2019-01-01 ENCOUNTER — APPOINTMENT (OUTPATIENT)
Dept: CT IMAGING | Age: 74
DRG: 682 | End: 2019-01-01
Attending: EMERGENCY MEDICINE
Payer: COMMERCIAL

## 2019-01-01 ENCOUNTER — APPOINTMENT (OUTPATIENT)
Dept: GENERAL RADIOLOGY | Age: 74
DRG: 682 | End: 2019-01-01
Attending: EMERGENCY MEDICINE
Payer: COMMERCIAL

## 2019-01-01 ENCOUNTER — APPOINTMENT (OUTPATIENT)
Dept: ULTRASOUND IMAGING | Age: 74
End: 2019-01-01
Attending: PHYSICIAN ASSISTANT
Payer: OTHER MISCELLANEOUS

## 2019-01-01 ENCOUNTER — HOSPITAL ENCOUNTER (INPATIENT)
Age: 74
LOS: 9 days | Discharge: HOME HEALTH | DRG: 433 | End: 2019-05-29
Attending: EMERGENCY MEDICINE | Admitting: INTERNAL MEDICINE
Payer: COMMERCIAL

## 2019-01-01 ENCOUNTER — HOSPITAL ENCOUNTER (OUTPATIENT)
Dept: LAB | Age: 74
Discharge: HOME | End: 2019-04-11
Payer: COMMERCIAL

## 2019-01-01 ENCOUNTER — HOSPITAL ENCOUNTER (INPATIENT)
Age: 74
LOS: 6 days | Discharge: ANOTHER HOSPITAL | DRG: 682 | End: 2019-04-01
Attending: EMERGENCY MEDICINE | Admitting: FAMILY MEDICINE
Payer: COMMERCIAL

## 2019-01-01 ENCOUNTER — HOSPITAL ENCOUNTER (OUTPATIENT)
Dept: INTERVENTIONAL RADIOLOGY/VASCULAR | Age: 74
Discharge: HOME | End: 2019-04-19
Attending: PHYSICIAN ASSISTANT
Payer: COMMERCIAL

## 2019-01-01 ENCOUNTER — HOSPITAL ENCOUNTER (OUTPATIENT)
Dept: INTERVENTIONAL RADIOLOGY/VASCULAR | Age: 74
Discharge: HOME | End: 2019-06-14
Attending: NURSE PRACTITIONER
Payer: COMMERCIAL

## 2019-01-01 VITALS
SYSTOLIC BLOOD PRESSURE: 116 MMHG | HEART RATE: 72 BPM | RESPIRATION RATE: 18 BRPM | TEMPERATURE: 97.9 F | BODY MASS INDEX: 29.62 KG/M2 | WEIGHT: 200 LBS | OXYGEN SATURATION: 98 % | DIASTOLIC BLOOD PRESSURE: 69 MMHG | HEIGHT: 69 IN

## 2019-01-01 VITALS
DIASTOLIC BLOOD PRESSURE: 65 MMHG | SYSTOLIC BLOOD PRESSURE: 110 MMHG | HEART RATE: 73 BPM | OXYGEN SATURATION: 99 % | RESPIRATION RATE: 14 BRPM | TEMPERATURE: 98.2 F

## 2019-01-01 VITALS
HEART RATE: 81 BPM | SYSTOLIC BLOOD PRESSURE: 128 MMHG | TEMPERATURE: 98.2 F | OXYGEN SATURATION: 98 % | RESPIRATION RATE: 14 BRPM | DIASTOLIC BLOOD PRESSURE: 71 MMHG

## 2019-01-01 VITALS
HEART RATE: 75 BPM | SYSTOLIC BLOOD PRESSURE: 112 MMHG | OXYGEN SATURATION: 95 % | HEIGHT: 68 IN | TEMPERATURE: 100.1 F | DIASTOLIC BLOOD PRESSURE: 67 MMHG | BODY MASS INDEX: 33.49 KG/M2 | WEIGHT: 221 LBS | RESPIRATION RATE: 22 BRPM

## 2019-01-01 VITALS
SYSTOLIC BLOOD PRESSURE: 112 MMHG | DIASTOLIC BLOOD PRESSURE: 61 MMHG | TEMPERATURE: 98.3 F | RESPIRATION RATE: 20 BRPM | HEART RATE: 75 BPM | OXYGEN SATURATION: 97 %

## 2019-01-01 VITALS
OXYGEN SATURATION: 97 % | TEMPERATURE: 98.2 F | HEIGHT: 68 IN | BODY MASS INDEX: 33.34 KG/M2 | RESPIRATION RATE: 20 BRPM | TEMPERATURE: 98.3 F | DIASTOLIC BLOOD PRESSURE: 76 MMHG | HEART RATE: 95 BPM | HEART RATE: 70 BPM | SYSTOLIC BLOOD PRESSURE: 127 MMHG | SYSTOLIC BLOOD PRESSURE: 115 MMHG | DIASTOLIC BLOOD PRESSURE: 68 MMHG | WEIGHT: 220 LBS | RESPIRATION RATE: 18 BRPM | OXYGEN SATURATION: 98 %

## 2019-01-01 VITALS
OXYGEN SATURATION: 98 % | HEART RATE: 76 BPM | TEMPERATURE: 98.8 F | SYSTOLIC BLOOD PRESSURE: 109 MMHG | DIASTOLIC BLOOD PRESSURE: 63 MMHG | RESPIRATION RATE: 18 BRPM

## 2019-01-01 VITALS
DIASTOLIC BLOOD PRESSURE: 69 MMHG | SYSTOLIC BLOOD PRESSURE: 117 MMHG | WEIGHT: 222 LBS | HEART RATE: 84 BPM | RESPIRATION RATE: 18 BRPM | BODY MASS INDEX: 34.26 KG/M2 | TEMPERATURE: 98.4 F | OXYGEN SATURATION: 98 %

## 2019-01-01 VITALS
WEIGHT: 230 LBS | BODY MASS INDEX: 34.86 KG/M2 | DIASTOLIC BLOOD PRESSURE: 62 MMHG | HEIGHT: 68 IN | HEART RATE: 70 BPM | SYSTOLIC BLOOD PRESSURE: 113 MMHG | TEMPERATURE: 98.1 F | RESPIRATION RATE: 22 BRPM | OXYGEN SATURATION: 99 %

## 2019-01-01 VITALS
OXYGEN SATURATION: 94 % | DIASTOLIC BLOOD PRESSURE: 67 MMHG | RESPIRATION RATE: 16 BRPM | TEMPERATURE: 98 F | SYSTOLIC BLOOD PRESSURE: 107 MMHG | WEIGHT: 214 LBS | BODY MASS INDEX: 30.64 KG/M2 | HEART RATE: 79 BPM | HEIGHT: 70 IN

## 2019-01-01 VITALS
WEIGHT: 225.97 LBS | BODY MASS INDEX: 33.37 KG/M2 | DIASTOLIC BLOOD PRESSURE: 72 MMHG | TEMPERATURE: 98.1 F | RESPIRATION RATE: 22 BRPM | SYSTOLIC BLOOD PRESSURE: 113 MMHG | OXYGEN SATURATION: 94 % | HEART RATE: 72 BPM

## 2019-01-01 VITALS
DIASTOLIC BLOOD PRESSURE: 68 MMHG | OXYGEN SATURATION: 97 % | RESPIRATION RATE: 20 BRPM | HEART RATE: 84 BPM | SYSTOLIC BLOOD PRESSURE: 128 MMHG | HEIGHT: 68 IN | BODY MASS INDEX: 33.34 KG/M2 | WEIGHT: 220 LBS

## 2019-01-01 VITALS
RESPIRATION RATE: 14 BRPM | OXYGEN SATURATION: 98 % | HEART RATE: 87 BPM | SYSTOLIC BLOOD PRESSURE: 132 MMHG | DIASTOLIC BLOOD PRESSURE: 75 MMHG | TEMPERATURE: 97.8 F

## 2019-01-01 VITALS
HEART RATE: 100 BPM | OXYGEN SATURATION: 97 % | DIASTOLIC BLOOD PRESSURE: 69 MMHG | HEIGHT: 68 IN | TEMPERATURE: 98.3 F | SYSTOLIC BLOOD PRESSURE: 122 MMHG | WEIGHT: 220 LBS | BODY MASS INDEX: 33.34 KG/M2 | RESPIRATION RATE: 17 BRPM

## 2019-01-01 VITALS
HEART RATE: 72 BPM | HEIGHT: 69 IN | BODY MASS INDEX: 30.16 KG/M2 | OXYGEN SATURATION: 94 % | SYSTOLIC BLOOD PRESSURE: 106 MMHG | TEMPERATURE: 98 F | RESPIRATION RATE: 16 BRPM | WEIGHT: 203.6 LBS | DIASTOLIC BLOOD PRESSURE: 62 MMHG

## 2019-01-01 DIAGNOSIS — R05.9 COUGH: ICD-10-CM

## 2019-01-01 DIAGNOSIS — E86.0 DEHYDRATION: ICD-10-CM

## 2019-01-01 DIAGNOSIS — R10.9 ACUTE ABDOMINAL PAIN: Primary | ICD-10-CM

## 2019-01-01 DIAGNOSIS — D72.829 LEUKOCYTOSIS, UNSPECIFIED TYPE: ICD-10-CM

## 2019-01-01 DIAGNOSIS — R18.8 ASCITES: ICD-10-CM

## 2019-01-01 DIAGNOSIS — J98.11 ATELECTASIS: ICD-10-CM

## 2019-01-01 DIAGNOSIS — K74.60 CIRRHOSIS (HCC): ICD-10-CM

## 2019-01-01 DIAGNOSIS — R60.1 ANASARCA: Primary | ICD-10-CM

## 2019-01-01 DIAGNOSIS — J90 PLEURAL EFFUSION, LEFT: ICD-10-CM

## 2019-01-01 DIAGNOSIS — C22.0 LCC (LIVER CELL CARCINOMA) (HCC): ICD-10-CM

## 2019-01-01 DIAGNOSIS — K70.31 ALCOHOLIC CIRRHOSIS OF LIVER WITH ASCITES (HCC): ICD-10-CM

## 2019-01-01 DIAGNOSIS — D69.6 THROMBOCYTOPENIA (HCC): Primary | ICD-10-CM

## 2019-01-01 DIAGNOSIS — K74.60 CIRRHOSIS OF LIVER WITH ASCITES, UNSPECIFIED HEPATIC CIRRHOSIS TYPE (HCC): ICD-10-CM

## 2019-01-01 DIAGNOSIS — E72.20 HYPERAMMONEMIA (HCC): ICD-10-CM

## 2019-01-01 DIAGNOSIS — R18.8 CIRRHOSIS OF LIVER WITH ASCITES, UNSPECIFIED HEPATIC CIRRHOSIS TYPE (HCC): ICD-10-CM

## 2019-01-01 DIAGNOSIS — N17.9 ACUTE KIDNEY INJURY (HCC): ICD-10-CM

## 2019-01-01 DIAGNOSIS — N17.9 AKI (ACUTE KIDNEY INJURY) (HCC): ICD-10-CM

## 2019-01-01 DIAGNOSIS — R60.1 ANASARCA: ICD-10-CM

## 2019-01-01 DIAGNOSIS — I10 ESSENTIAL HYPERTENSION: ICD-10-CM

## 2019-01-01 DIAGNOSIS — J18.9 PNEUMONIA OF LEFT LOWER LOBE DUE TO INFECTIOUS ORGANISM: ICD-10-CM

## 2019-01-01 PROBLEM — E87.70 VOLUME OVERLOAD: Status: ACTIVE | Noted: 2018-01-01

## 2019-01-01 LAB
ABO + RH BLD: NORMAL
ABO + RH BLD: NORMAL
AFP-TM SERPL-MCNC: 42.4 NG/ML
AFP-TM SERPL-MCNC: 47 NG/ML
AFP-TM SERPL-MCNC: 51.8 NG/ML
ALBUMIN SERPL-MCNC: 1.6 G/DL (ref 3.2–4.6)
ALBUMIN SERPL-MCNC: 1.6 G/DL (ref 3.2–4.6)
ALBUMIN SERPL-MCNC: 2.4 G/DL (ref 3.2–4.6)
ALBUMIN SERPL-MCNC: 2.4 G/DL (ref 3.2–4.6)
ALBUMIN SERPL-MCNC: 2.5 G/DL (ref 3.2–4.6)
ALBUMIN SERPL-MCNC: 2.6 G/DL (ref 3.2–4.6)
ALBUMIN SERPL-MCNC: 2.7 G/DL (ref 3.2–4.6)
ALBUMIN SERPL-MCNC: 2.8 G/DL (ref 3.2–4.6)
ALBUMIN SERPL-MCNC: 2.9 G/DL (ref 3.2–4.6)
ALBUMIN SERPL-MCNC: 2.9 G/DL (ref 3.2–4.6)
ALBUMIN SERPL-MCNC: 3 G/DL (ref 3.2–4.6)
ALBUMIN SERPL-MCNC: 3.1 G/DL (ref 3.2–4.6)
ALBUMIN SERPL-MCNC: 3.2 G/DL (ref 3.2–4.6)
ALBUMIN/GLOB SERPL: 0.3 {RATIO} (ref 1.2–3.5)
ALBUMIN/GLOB SERPL: 0.3 {RATIO} (ref 1.2–3.5)
ALBUMIN/GLOB SERPL: 0.5 {RATIO} (ref 1.2–3.5)
ALBUMIN/GLOB SERPL: 0.6 {RATIO} (ref 1.2–3.5)
ALBUMIN/GLOB SERPL: 0.7 {RATIO} (ref 1.2–3.5)
ALBUMIN/GLOB SERPL: 0.8 {RATIO}
ALBUMIN/GLOB SERPL: 0.8 {RATIO} (ref 1.2–3.5)
ALBUMIN/GLOB SERPL: 0.9 {RATIO} (ref 1.2–3.5)
ALBUMIN/GLOB SERPL: 1 {RATIO} (ref 1.2–3.5)
ALBUMIN/GLOB SERPL: 1 {RATIO} (ref 1.2–3.5)
ALBUMIN/GLOB SERPL: 1.1 {RATIO} (ref 1.2–3.5)
ALP SERPL-CCNC: 105 U/L (ref 50–136)
ALP SERPL-CCNC: 108 U/L (ref 50–136)
ALP SERPL-CCNC: 111 U/L (ref 50–136)
ALP SERPL-CCNC: 113 U/L (ref 50–136)
ALP SERPL-CCNC: 141 U/L (ref 50–136)
ALP SERPL-CCNC: 148 U/L (ref 50–136)
ALP SERPL-CCNC: 197 U/L (ref 50–136)
ALP SERPL-CCNC: 200 U/L (ref 50–136)
ALP SERPL-CCNC: 205 U/L (ref 50–136)
ALP SERPL-CCNC: 213 U/L (ref 50–136)
ALP SERPL-CCNC: 228 U/L (ref 50–136)
ALP SERPL-CCNC: 230 U/L (ref 50–136)
ALP SERPL-CCNC: 245 U/L (ref 50–136)
ALP SERPL-CCNC: 275 U/L (ref 50–136)
ALP SERPL-CCNC: 300 U/L (ref 50–136)
ALP SERPL-CCNC: 302 U/L (ref 50–136)
ALP SERPL-CCNC: 407 U/L (ref 50–136)
ALP SERPL-CCNC: 472 U/L (ref 50–136)
ALP SERPL-CCNC: 86 U/L (ref 50–136)
ALP SERPL-CCNC: 94 U/L (ref 50–136)
ALT SERPL-CCNC: 19 U/L (ref 12–65)
ALT SERPL-CCNC: 22 U/L (ref 12–65)
ALT SERPL-CCNC: 25 U/L (ref 12–65)
ALT SERPL-CCNC: 26 U/L (ref 12–65)
ALT SERPL-CCNC: 26 U/L (ref 12–65)
ALT SERPL-CCNC: 27 U/L (ref 12–65)
ALT SERPL-CCNC: 29 U/L (ref 12–65)
ALT SERPL-CCNC: 30 U/L (ref 12–65)
ALT SERPL-CCNC: 31 U/L (ref 12–65)
ALT SERPL-CCNC: 32 U/L (ref 12–65)
ALT SERPL-CCNC: 34 U/L (ref 12–65)
ALT SERPL-CCNC: 40 U/L (ref 12–65)
ALT SERPL-CCNC: 48 U/L (ref 12–65)
AMMONIA PLAS-SCNC: 132 UMOL/L (ref 11–32)
AMMONIA PLAS-SCNC: 14 UMOL/L (ref 11–32)
AMMONIA PLAS-SCNC: 18 UMOL/L (ref 11–32)
AMMONIA PLAS-SCNC: 19 UMOL/L (ref 11–32)
AMMONIA PLAS-SCNC: 23 UMOL/L (ref 11–32)
AMMONIA PLAS-SCNC: 33 UMOL/L (ref 11–32)
AMMONIA PLAS-SCNC: 44 UMOL/L (ref 24.9–68)
AMMONIA PLAS-SCNC: 54 UMOL/L (ref 11–32)
AMMONIA PLAS-SCNC: 82 UMOL/L (ref 11–32)
AMMONIA PLAS-SCNC: 86 UMOL/L (ref 11–32)
AMMONIA PLAS-SCNC: 89 UMOL/L (ref 11–32)
ANION GAP SERPL CALC-SCNC: 10 MMOL/L (ref 7–16)
ANION GAP SERPL CALC-SCNC: 10 MMOL/L (ref 7–16)
ANION GAP SERPL CALC-SCNC: 11 MMOL/L (ref 7–16)
ANION GAP SERPL CALC-SCNC: 6 MMOL/L
ANION GAP SERPL CALC-SCNC: 6 MMOL/L (ref 7–16)
ANION GAP SERPL CALC-SCNC: 7 MMOL/L (ref 7–16)
ANION GAP SERPL CALC-SCNC: 8 MMOL/L (ref 7–16)
ANION GAP SERPL CALC-SCNC: 9 MMOL/L (ref 7–16)
ANION GAP SERPL CALC-SCNC: 9 MMOL/L (ref 7–16)
APPEARANCE FLD: NORMAL
APTT PPP: 31.3 SEC (ref 24.7–39.8)
APTT PPP: 36.8 SEC (ref 24.7–39.8)
APTT PPP: 37 SEC (ref 24.7–39.8)
APTT PPP: 38.6 SEC (ref 24.7–39.8)
APTT PPP: 40.2 SEC (ref 24.7–39.8)
AST SERPL-CCNC: 37 U/L (ref 15–37)
AST SERPL-CCNC: 45 U/L (ref 15–37)
AST SERPL-CCNC: 46 U/L (ref 15–37)
AST SERPL-CCNC: 46 U/L (ref 15–37)
AST SERPL-CCNC: 48 U/L (ref 15–37)
AST SERPL-CCNC: 49 U/L (ref 15–37)
AST SERPL-CCNC: 52 U/L (ref 15–37)
AST SERPL-CCNC: 53 U/L (ref 15–37)
AST SERPL-CCNC: 56 U/L (ref 15–37)
AST SERPL-CCNC: 58 U/L (ref 15–37)
AST SERPL-CCNC: 58 U/L (ref 15–37)
AST SERPL-CCNC: 63 U/L (ref 15–37)
AST SERPL-CCNC: 64 U/L (ref 15–37)
AST SERPL-CCNC: 65 U/L (ref 15–37)
AST SERPL-CCNC: 68 U/L (ref 15–37)
AST SERPL-CCNC: 71 U/L (ref 15–37)
AST SERPL-CCNC: 79 U/L (ref 15–37)
ATRIAL RATE: 93 BPM
BACTERIA SPEC CULT: ABNORMAL
BACTERIA SPEC CULT: NORMAL
BASOPHILS # BLD: 0 K/UL (ref 0–0.2)
BASOPHILS NFR BLD: 0 % (ref 0–2)
BASOPHILS NFR BLD: 1 % (ref 0–2)
BILIRUB DIRECT SERPL-MCNC: 0.5 MG/DL
BILIRUB DIRECT SERPL-MCNC: 0.5 MG/DL
BILIRUB DIRECT SERPL-MCNC: 0.8 MG/DL
BILIRUB INDIRECT SERPL-MCNC: 0.6 MG/DL (ref 0–1.1)
BILIRUB SERPL-MCNC: 0.9 MG/DL (ref 0.2–1.1)
BILIRUB SERPL-MCNC: 1 MG/DL (ref 0.2–1.1)
BILIRUB SERPL-MCNC: 1.1 MG/DL (ref 0.2–1.1)
BILIRUB SERPL-MCNC: 1.2 MG/DL (ref 0.2–1.1)
BILIRUB SERPL-MCNC: 1.4 MG/DL (ref 0.2–1.1)
BILIRUB SERPL-MCNC: 1.5 MG/DL (ref 0.2–1.1)
BILIRUB SERPL-MCNC: 1.5 MG/DL (ref 0.2–1.1)
BILIRUB SERPL-MCNC: 1.6 MG/DL (ref 0.2–1.1)
BILIRUB SERPL-MCNC: 1.8 MG/DL (ref 0.2–1.1)
BILIRUB SERPL-MCNC: 1.9 MG/DL (ref 0.2–1.1)
BILIRUB SERPL-MCNC: 2 MG/DL (ref 0.2–1.1)
BILIRUB SERPL-MCNC: 2 MG/DL (ref 0.2–1.1)
BILIRUB SERPL-MCNC: 2.1 MG/DL (ref 0.2–1.1)
BILIRUB SERPL-MCNC: 2.2 MG/DL (ref 0.2–1.1)
BLD PROD TYP BPU: NORMAL
BLOOD GROUP ANTIBODIES SERPL: NORMAL
BLOOD GROUP ANTIBODIES SERPL: NORMAL
BNP SERPL-MCNC: 56 PG/ML
BNP SERPL-MCNC: 65 PG/ML
BPU ID: NORMAL
BUN SERPL-MCNC: 10 MG/DL (ref 8–23)
BUN SERPL-MCNC: 10 MG/DL (ref 8–23)
BUN SERPL-MCNC: 13 MG/DL (ref 8–23)
BUN SERPL-MCNC: 14 MG/DL (ref 8–23)
BUN SERPL-MCNC: 14 MG/DL (ref 8–23)
BUN SERPL-MCNC: 15 MG/DL (ref 8–23)
BUN SERPL-MCNC: 16 MG/DL (ref 8–23)
BUN SERPL-MCNC: 18 MG/DL (ref 8–23)
BUN SERPL-MCNC: 22 MG/DL (ref 8–23)
BUN SERPL-MCNC: 26 MG/DL (ref 8–23)
BUN SERPL-MCNC: 26 MG/DL (ref 8–23)
BUN SERPL-MCNC: 27 MG/DL (ref 8–23)
BUN SERPL-MCNC: 28 MG/DL (ref 8–23)
BUN SERPL-MCNC: 28 MG/DL (ref 8–23)
BUN SERPL-MCNC: 29 MG/DL (ref 8–23)
BUN SERPL-MCNC: 30 MG/DL (ref 8–23)
BUN SERPL-MCNC: 30 MG/DL (ref 8–23)
CALCIUM SERPL-MCNC: 7.9 MG/DL (ref 8.3–10.4)
CALCIUM SERPL-MCNC: 7.9 MG/DL (ref 8.3–10.4)
CALCIUM SERPL-MCNC: 8 MG/DL (ref 8.3–10.4)
CALCIUM SERPL-MCNC: 8 MG/DL (ref 8.3–10.4)
CALCIUM SERPL-MCNC: 8.1 MG/DL (ref 8.3–10.4)
CALCIUM SERPL-MCNC: 8.1 MG/DL (ref 8.3–10.4)
CALCIUM SERPL-MCNC: 8.2 MG/DL (ref 8.3–10.4)
CALCIUM SERPL-MCNC: 8.3 MG/DL (ref 8.3–10.4)
CALCIUM SERPL-MCNC: 8.4 MG/DL (ref 8.3–10.4)
CALCIUM SERPL-MCNC: 8.5 MG/DL (ref 8.3–10.4)
CALCIUM SERPL-MCNC: 8.6 MG/DL (ref 8.3–10.4)
CALCIUM SERPL-MCNC: 8.7 MG/DL (ref 8.3–10.4)
CALCIUM SERPL-MCNC: 8.7 MG/DL (ref 8.3–10.4)
CALCULATED R AXIS, ECG10: 26 DEGREES
CALCULATED T AXIS, ECG11: -104 DEGREES
CHLORIDE SERPL-SCNC: 100 MMOL/L (ref 98–107)
CHLORIDE SERPL-SCNC: 100 MMOL/L (ref 98–107)
CHLORIDE SERPL-SCNC: 101 MMOL/L (ref 98–107)
CHLORIDE SERPL-SCNC: 102 MMOL/L (ref 98–107)
CHLORIDE SERPL-SCNC: 107 MMOL/L (ref 98–107)
CHLORIDE SERPL-SCNC: 85 MMOL/L (ref 98–107)
CHLORIDE SERPL-SCNC: 85 MMOL/L (ref 98–107)
CHLORIDE SERPL-SCNC: 86 MMOL/L (ref 98–107)
CHLORIDE SERPL-SCNC: 86 MMOL/L (ref 98–107)
CHLORIDE SERPL-SCNC: 87 MMOL/L (ref 98–107)
CHLORIDE SERPL-SCNC: 88 MMOL/L (ref 98–107)
CHLORIDE SERPL-SCNC: 89 MMOL/L (ref 98–107)
CHLORIDE SERPL-SCNC: 90 MMOL/L (ref 98–107)
CHLORIDE SERPL-SCNC: 90 MMOL/L (ref 98–107)
CHLORIDE SERPL-SCNC: 91 MMOL/L (ref 98–107)
CHLORIDE SERPL-SCNC: 91 MMOL/L (ref 98–107)
CHLORIDE SERPL-SCNC: 93 MMOL/L (ref 98–107)
CHLORIDE SERPL-SCNC: 94 MMOL/L (ref 98–107)
CHLORIDE SERPL-SCNC: 94 MMOL/L (ref 98–107)
CHLORIDE SERPL-SCNC: 96 MMOL/L (ref 98–107)
CHLORIDE SERPL-SCNC: 97 MMOL/L (ref 98–107)
CHLORIDE SERPL-SCNC: 98 MMOL/L (ref 98–107)
CHLORIDE SERPL-SCNC: 98 MMOL/L (ref 98–107)
CO2 SERPL-SCNC: 21 MMOL/L (ref 21–32)
CO2 SERPL-SCNC: 22 MMOL/L (ref 21–32)
CO2 SERPL-SCNC: 23 MMOL/L (ref 21–32)
CO2 SERPL-SCNC: 24 MMOL/L (ref 21–32)
CO2 SERPL-SCNC: 25 MMOL/L (ref 21–32)
CO2 SERPL-SCNC: 26 MMOL/L (ref 21–32)
CO2 SERPL-SCNC: 26 MMOL/L (ref 21–32)
CO2 SERPL-SCNC: 27 MMOL/L (ref 21–32)
CO2 SERPL-SCNC: 28 MMOL/L (ref 21–32)
CO2 SERPL-SCNC: 29 MMOL/L (ref 21–32)
CO2 SERPL-SCNC: 30 MMOL/L (ref 21–32)
CO2 SERPL-SCNC: 30 MMOL/L (ref 21–32)
COLOR FLD: YELLOW
CREAT SERPL-MCNC: 0.92 MG/DL (ref 0.8–1.5)
CREAT SERPL-MCNC: 0.94 MG/DL (ref 0.8–1.5)
CREAT SERPL-MCNC: 0.97 MG/DL (ref 0.8–1.5)
CREAT SERPL-MCNC: 0.98 MG/DL (ref 0.8–1.5)
CREAT SERPL-MCNC: 0.98 MG/DL (ref 0.8–1.5)
CREAT SERPL-MCNC: 0.99 MG/DL (ref 0.8–1.5)
CREAT SERPL-MCNC: 1 MG/DL (ref 0.8–1.5)
CREAT SERPL-MCNC: 1.06 MG/DL (ref 0.8–1.5)
CREAT SERPL-MCNC: 1.06 MG/DL (ref 0.8–1.5)
CREAT SERPL-MCNC: 1.07 MG/DL (ref 0.8–1.5)
CREAT SERPL-MCNC: 1.12 MG/DL (ref 0.8–1.5)
CREAT SERPL-MCNC: 1.18 MG/DL (ref 0.8–1.5)
CREAT SERPL-MCNC: 1.2 MG/DL (ref 0.8–1.5)
CREAT SERPL-MCNC: 1.23 MG/DL (ref 0.8–1.5)
CREAT SERPL-MCNC: 1.29 MG/DL (ref 0.8–1.5)
CREAT SERPL-MCNC: 1.35 MG/DL (ref 0.8–1.5)
CREAT SERPL-MCNC: 1.36 MG/DL (ref 0.8–1.5)
CREAT SERPL-MCNC: 1.42 MG/DL (ref 0.8–1.5)
CREAT SERPL-MCNC: 1.45 MG/DL (ref 0.8–1.5)
CREAT SERPL-MCNC: 1.47 MG/DL (ref 0.8–1.5)
CREAT SERPL-MCNC: 1.52 MG/DL (ref 0.8–1.5)
CREAT SERPL-MCNC: 1.55 MG/DL (ref 0.8–1.5)
CREAT SERPL-MCNC: 1.59 MG/DL (ref 0.8–1.5)
CREAT SERPL-MCNC: 1.6 MG/DL (ref 0.8–1.5)
CREAT SERPL-MCNC: 1.62 MG/DL (ref 0.8–1.5)
CREAT SERPL-MCNC: 1.62 MG/DL (ref 0.8–1.5)
CREAT SERPL-MCNC: 1.65 MG/DL (ref 0.8–1.5)
CREAT SERPL-MCNC: 1.74 MG/DL (ref 0.8–1.5)
CREAT SERPL-MCNC: 1.87 MG/DL (ref 0.8–1.5)
CROSSMATCH RESULT,%XM: NORMAL
D DIMER PPP FEU-MCNC: >20 UG/ML(FEU)
DIAGNOSIS, 93000: NORMAL
DIFFERENTIAL METHOD BLD: ABNORMAL
EOSINOPHIL # BLD: 0 K/UL (ref 0–0.8)
EOSINOPHIL # BLD: 0.1 K/UL (ref 0–0.8)
EOSINOPHIL # BLD: 0.2 K/UL (ref 0–0.8)
EOSINOPHIL NFR BLD: 0 % (ref 0.5–7.8)
EOSINOPHIL NFR BLD: 1 % (ref 0.5–7.8)
EOSINOPHIL NFR BLD: 1 % (ref 0.5–7.8)
EOSINOPHIL NFR BLD: 2 % (ref 0.5–7.8)
EOSINOPHIL NFR BLD: 3 % (ref 0.5–7.8)
EOSINOPHIL NFR BLD: 4 % (ref 0.5–7.8)
EOSINOPHIL NFR BLD: 4 % (ref 0.5–7.8)
ERYTHROCYTE [DISTWIDTH] IN BLOOD BY AUTOMATED COUNT: 14.8 % (ref 11.9–14.6)
ERYTHROCYTE [DISTWIDTH] IN BLOOD BY AUTOMATED COUNT: 14.9 % (ref 11.9–14.6)
ERYTHROCYTE [DISTWIDTH] IN BLOOD BY AUTOMATED COUNT: 15.2 % (ref 11.9–14.6)
ERYTHROCYTE [DISTWIDTH] IN BLOOD BY AUTOMATED COUNT: 15.2 % (ref 11.9–14.6)
ERYTHROCYTE [DISTWIDTH] IN BLOOD BY AUTOMATED COUNT: 15.5 % (ref 11.9–14.6)
ERYTHROCYTE [DISTWIDTH] IN BLOOD BY AUTOMATED COUNT: 15.6 % (ref 11.9–14.6)
ERYTHROCYTE [DISTWIDTH] IN BLOOD BY AUTOMATED COUNT: 15.7 % (ref 11.9–14.6)
ERYTHROCYTE [DISTWIDTH] IN BLOOD BY AUTOMATED COUNT: 15.8 % (ref 11.9–14.6)
ERYTHROCYTE [DISTWIDTH] IN BLOOD BY AUTOMATED COUNT: 15.9 % (ref 11.9–14.6)
ERYTHROCYTE [DISTWIDTH] IN BLOOD BY AUTOMATED COUNT: 16 % (ref 11.9–14.6)
ERYTHROCYTE [DISTWIDTH] IN BLOOD BY AUTOMATED COUNT: 16.1 % (ref 11.9–14.6)
ERYTHROCYTE [DISTWIDTH] IN BLOOD BY AUTOMATED COUNT: 16.1 % (ref 11.9–14.6)
ERYTHROCYTE [DISTWIDTH] IN BLOOD BY AUTOMATED COUNT: 16.2 % (ref 11.9–14.6)
ERYTHROCYTE [DISTWIDTH] IN BLOOD BY AUTOMATED COUNT: 16.2 % (ref 11.9–14.6)
ERYTHROCYTE [DISTWIDTH] IN BLOOD BY AUTOMATED COUNT: 16.3 % (ref 11.9–14.6)
ERYTHROCYTE [DISTWIDTH] IN BLOOD BY AUTOMATED COUNT: 16.3 % (ref 11.9–14.6)
ERYTHROCYTE [DISTWIDTH] IN BLOOD BY AUTOMATED COUNT: 16.4 % (ref 11.9–14.6)
ERYTHROCYTE [DISTWIDTH] IN BLOOD BY AUTOMATED COUNT: 19.9 % (ref 11.9–14.6)
ERYTHROCYTE [DISTWIDTH] IN BLOOD BY AUTOMATED COUNT: 19.9 % (ref 11.9–14.6)
ETHANOL SERPL-MCNC: <3 MG/DL
FIBRINOGEN PPP-MCNC: 425 MG/DL (ref 190–501)
GLOBULIN SER CALC-MCNC: 2.7 G/DL (ref 2.3–3.5)
GLOBULIN SER CALC-MCNC: 2.7 G/DL (ref 2.3–3.5)
GLOBULIN SER CALC-MCNC: 2.8 G/DL (ref 2.3–3.5)
GLOBULIN SER CALC-MCNC: 2.8 G/DL (ref 2.3–3.5)
GLOBULIN SER CALC-MCNC: 3.3 G/DL (ref 2.3–3.5)
GLOBULIN SER CALC-MCNC: 3.3 G/DL (ref 2.3–3.5)
GLOBULIN SER CALC-MCNC: 3.4 G/DL (ref 2.3–3.5)
GLOBULIN SER CALC-MCNC: 3.4 G/DL (ref 2.3–3.5)
GLOBULIN SER CALC-MCNC: 3.5 G/DL (ref 2.3–3.5)
GLOBULIN SER CALC-MCNC: 3.6 G/DL (ref 2.3–3.5)
GLOBULIN SER CALC-MCNC: 3.8 G/DL (ref 2.3–3.5)
GLOBULIN SER CALC-MCNC: 3.9 G/DL (ref 2.3–3.5)
GLOBULIN SER CALC-MCNC: 3.9 G/DL (ref 2.3–3.5)
GLOBULIN SER CALC-MCNC: 4.3 G/DL (ref 2.3–3.5)
GLOBULIN SER CALC-MCNC: 4.4 G/DL (ref 2.3–3.5)
GLOBULIN SER CALC-MCNC: 4.8 G/DL (ref 2.3–3.5)
GLOBULIN SER CALC-MCNC: 4.8 G/DL (ref 2.3–3.5)
GLUCOSE SERPL-MCNC: 101 MG/DL (ref 65–100)
GLUCOSE SERPL-MCNC: 102 MG/DL (ref 65–100)
GLUCOSE SERPL-MCNC: 102 MG/DL (ref 65–100)
GLUCOSE SERPL-MCNC: 107 MG/DL (ref 65–100)
GLUCOSE SERPL-MCNC: 109 MG/DL (ref 65–100)
GLUCOSE SERPL-MCNC: 110 MG/DL (ref 65–100)
GLUCOSE SERPL-MCNC: 112 MG/DL (ref 65–100)
GLUCOSE SERPL-MCNC: 115 MG/DL (ref 65–100)
GLUCOSE SERPL-MCNC: 120 MG/DL (ref 65–100)
GLUCOSE SERPL-MCNC: 128 MG/DL (ref 65–100)
GLUCOSE SERPL-MCNC: 130 MG/DL (ref 65–100)
GLUCOSE SERPL-MCNC: 133 MG/DL (ref 65–100)
GLUCOSE SERPL-MCNC: 133 MG/DL (ref 65–100)
GLUCOSE SERPL-MCNC: 134 MG/DL (ref 65–100)
GLUCOSE SERPL-MCNC: 146 MG/DL (ref 65–100)
GLUCOSE SERPL-MCNC: 150 MG/DL (ref 65–100)
GLUCOSE SERPL-MCNC: 151 MG/DL (ref 65–100)
GLUCOSE SERPL-MCNC: 151 MG/DL (ref 65–100)
GLUCOSE SERPL-MCNC: 153 MG/DL (ref 65–100)
GLUCOSE SERPL-MCNC: 158 MG/DL (ref 65–100)
GLUCOSE SERPL-MCNC: 162 MG/DL (ref 65–100)
GLUCOSE SERPL-MCNC: 173 MG/DL (ref 65–100)
GLUCOSE SERPL-MCNC: 177 MG/DL (ref 65–100)
GLUCOSE SERPL-MCNC: 181 MG/DL (ref 65–100)
GLUCOSE SERPL-MCNC: 187 MG/DL (ref 65–100)
GLUCOSE SERPL-MCNC: 217 MG/DL (ref 65–100)
GLUCOSE SERPL-MCNC: 84 MG/DL (ref 65–100)
GLUCOSE SERPL-MCNC: 97 MG/DL (ref 65–100)
GLUCOSE SERPL-MCNC: 98 MG/DL (ref 65–100)
GRAM STN SPEC: ABNORMAL
GRAM STN SPEC: ABNORMAL
GRAM STN SPEC: NORMAL
HAPTOGLOB SERPL-MCNC: 124 MG/DL (ref 30–200)
HAV AB SER QL IA: POSITIVE
HAV AB SER QL IA: POSITIVE
HBV SURFACE AB SER QL: NON REACTIVE
HBV SURFACE AB SERPL IA-ACNC: <3.1 MIU/ML
HCT VFR BLD AUTO: 24.2 % (ref 41.1–50.3)
HCT VFR BLD AUTO: 24.2 % (ref 41.1–50.3)
HCT VFR BLD AUTO: 24.4 % (ref 41.1–50.3)
HCT VFR BLD AUTO: 24.6 % (ref 41.1–50.3)
HCT VFR BLD AUTO: 24.7 % (ref 41.1–50.3)
HCT VFR BLD AUTO: 25.9 % (ref 41.1–50.3)
HCT VFR BLD AUTO: 26.1 % (ref 41.1–50.3)
HCT VFR BLD AUTO: 26.7 % (ref 41.1–50.3)
HCT VFR BLD AUTO: 27.2 % (ref 41.1–50.3)
HCT VFR BLD AUTO: 27.3 % (ref 41.1–50.3)
HCT VFR BLD AUTO: 27.5 % (ref 41.1–50.3)
HCT VFR BLD AUTO: 27.6 % (ref 41.1–50.3)
HCT VFR BLD AUTO: 28.3 % (ref 41.1–50.3)
HCT VFR BLD AUTO: 28.3 % (ref 41.1–50.3)
HCT VFR BLD AUTO: 28.7 % (ref 41.1–50.3)
HCT VFR BLD AUTO: 29.5 % (ref 41.1–50.3)
HCT VFR BLD AUTO: 29.6 % (ref 41.1–50.3)
HCT VFR BLD AUTO: 32.6 % (ref 41.1–50.3)
HCT VFR BLD AUTO: 33.1 % (ref 41.1–50.3)
HCT VFR BLD AUTO: 35.4 % (ref 41.1–50.3)
HCT VFR BLD AUTO: 36.7 % (ref 41.1–50.3)
HGB BLD-MCNC: 11.2 G/DL (ref 13.6–17.2)
HGB BLD-MCNC: 11.4 G/DL (ref 13.6–17.2)
HGB BLD-MCNC: 11.6 G/DL (ref 13.6–17.2)
HGB BLD-MCNC: 12.1 G/DL (ref 13.6–17.2)
HGB BLD-MCNC: 7.6 G/DL (ref 13.6–17.2)
HGB BLD-MCNC: 7.7 G/DL (ref 13.6–17.2)
HGB BLD-MCNC: 7.8 G/DL (ref 13.6–17.2)
HGB BLD-MCNC: 7.9 G/DL (ref 13.6–17.2)
HGB BLD-MCNC: 8.2 G/DL (ref 13.6–17.2)
HGB BLD-MCNC: 8.7 G/DL (ref 13.6–17.2)
HGB BLD-MCNC: 8.8 G/DL (ref 13.6–17.2)
HGB BLD-MCNC: 9 G/DL (ref 13.6–17.2)
HGB BLD-MCNC: 9.2 G/DL (ref 13.6–17.2)
HGB BLD-MCNC: 9.2 G/DL (ref 13.6–17.2)
HGB BLD-MCNC: 9.4 G/DL (ref 13.6–17.2)
HGB BLD-MCNC: 9.5 G/DL (ref 13.6–17.2)
HGB BLD-MCNC: 9.7 G/DL (ref 13.6–17.2)
HGB RETIC QN AUTO: 29 PG (ref 29–35)
IMM GRANULOCYTES # BLD AUTO: 0 K/UL (ref 0–0.5)
IMM GRANULOCYTES # BLD AUTO: 0.1 K/UL (ref 0–0.5)
IMM GRANULOCYTES # BLD AUTO: 0.2 K/UL (ref 0–0.5)
IMM GRANULOCYTES # BLD AUTO: 0.2 K/UL (ref 0–0.5)
IMM GRANULOCYTES NFR BLD AUTO: 0 % (ref 0–5)
IMM GRANULOCYTES NFR BLD AUTO: 1 % (ref 0–5)
IMM GRANULOCYTES NFR BLD AUTO: 2 % (ref 0–5)
IMM GRANULOCYTES NFR BLD AUTO: 2 % (ref 0–5)
IMM RETICS NFR: 18.6 % (ref 2.3–13.4)
INR PPP: 1.1
INR PPP: 1.2
INR PPP: 1.3
INR PPP: 1.4
INR PPP: 1.5
LACTATE SERPL-SCNC: 1.4 MMOL/L (ref 0.4–2)
LDH SERPL L TO P-CCNC: 209 U/L (ref 110–210)
LIPASE SERPL-CCNC: 253 U/L (ref 73–393)
LIPASE SERPL-CCNC: 304 U/L (ref 73–393)
LYMPHOCYTES # BLD: 0.3 K/UL (ref 0.5–4.6)
LYMPHOCYTES # BLD: 0.4 K/UL (ref 0.5–4.6)
LYMPHOCYTES # BLD: 0.5 K/UL (ref 0.5–4.6)
LYMPHOCYTES # BLD: 0.5 K/UL (ref 0.5–4.6)
LYMPHOCYTES # BLD: 0.6 K/UL (ref 0.5–4.6)
LYMPHOCYTES # BLD: 0.7 K/UL (ref 0.5–4.6)
LYMPHOCYTES # BLD: 0.8 K/UL (ref 0.5–4.6)
LYMPHOCYTES NFR BLD: 10 % (ref 13–44)
LYMPHOCYTES NFR BLD: 11 % (ref 13–44)
LYMPHOCYTES NFR BLD: 11 % (ref 13–44)
LYMPHOCYTES NFR BLD: 12 % (ref 13–44)
LYMPHOCYTES NFR BLD: 3 % (ref 13–44)
LYMPHOCYTES NFR BLD: 4 % (ref 13–44)
LYMPHOCYTES NFR BLD: 5 % (ref 13–44)
LYMPHOCYTES NFR BLD: 6 % (ref 13–44)
LYMPHOCYTES NFR BLD: 7 % (ref 13–44)
LYMPHOCYTES NFR BLD: 9 % (ref 13–44)
LYMPHOCYTES NFR FLD: 75 %
LYMPHOCYTES NFR FLD: 86 %
LYMPHOCYTES NFR FLD: 95 %
LYMPHOCYTES NFR FLD: 98 %
MAGNESIUM SERPL-MCNC: 1.7 MG/DL (ref 1.8–2.4)
MAGNESIUM SERPL-MCNC: 1.9 MG/DL (ref 1.8–2.4)
MAGNESIUM SERPL-MCNC: 2.1 MG/DL (ref 1.8–2.4)
MAGNESIUM SERPL-MCNC: 2.1 MG/DL (ref 1.8–2.4)
MAGNESIUM SERPL-MCNC: 2.2 MG/DL (ref 1.8–2.4)
MAGNESIUM SERPL-MCNC: 2.2 MG/DL (ref 1.8–2.4)
MAGNESIUM SERPL-MCNC: 2.3 MG/DL (ref 1.8–2.4)
MAGNESIUM SERPL-MCNC: 2.5 MG/DL (ref 1.8–2.4)
MCH RBC QN AUTO: 26.6 PG (ref 26.1–32.9)
MCH RBC QN AUTO: 26.7 PG (ref 26.1–32.9)
MCH RBC QN AUTO: 26.8 PG (ref 26.1–32.9)
MCH RBC QN AUTO: 26.8 PG (ref 26.1–32.9)
MCH RBC QN AUTO: 27 PG (ref 26.1–32.9)
MCH RBC QN AUTO: 27.1 PG (ref 26.1–32.9)
MCH RBC QN AUTO: 27.1 PG (ref 26.1–32.9)
MCH RBC QN AUTO: 27.4 PG (ref 26.1–32.9)
MCH RBC QN AUTO: 27.6 PG (ref 26.1–32.9)
MCH RBC QN AUTO: 27.6 PG (ref 26.1–32.9)
MCH RBC QN AUTO: 27.7 PG (ref 26.1–32.9)
MCH RBC QN AUTO: 28.8 PG (ref 26.1–32.9)
MCH RBC QN AUTO: 29.1 PG (ref 26.1–32.9)
MCH RBC QN AUTO: 29.4 PG (ref 26.1–32.9)
MCH RBC QN AUTO: 29.6 PG (ref 26.1–32.9)
MCH RBC QN AUTO: 29.7 PG (ref 26.1–32.9)
MCH RBC QN AUTO: 29.8 PG (ref 26.1–32.9)
MCH RBC QN AUTO: 29.8 PG (ref 26.1–32.9)
MCH RBC QN AUTO: 29.9 PG (ref 26.1–32.9)
MCH RBC QN AUTO: 30 PG (ref 26.1–32.9)
MCH RBC QN AUTO: 30.1 PG (ref 26.1–32.9)
MCHC RBC AUTO-ENTMCNC: 30.3 G/DL (ref 31.4–35)
MCHC RBC AUTO-ENTMCNC: 31.2 G/DL (ref 31.4–35)
MCHC RBC AUTO-ENTMCNC: 31.3 G/DL (ref 31.4–35)
MCHC RBC AUTO-ENTMCNC: 31.4 G/DL (ref 31.4–35)
MCHC RBC AUTO-ENTMCNC: 31.7 G/DL (ref 31.4–35)
MCHC RBC AUTO-ENTMCNC: 31.8 G/DL (ref 31.4–35)
MCHC RBC AUTO-ENTMCNC: 31.9 G/DL (ref 31.4–35)
MCHC RBC AUTO-ENTMCNC: 32 G/DL (ref 31.4–35)
MCHC RBC AUTO-ENTMCNC: 32.1 G/DL (ref 31.4–35)
MCHC RBC AUTO-ENTMCNC: 32.1 G/DL (ref 31.4–35)
MCHC RBC AUTO-ENTMCNC: 32.5 G/DL (ref 31.4–35)
MCHC RBC AUTO-ENTMCNC: 32.8 G/DL (ref 31.4–35)
MCHC RBC AUTO-ENTMCNC: 33 G/DL (ref 31.4–35)
MCHC RBC AUTO-ENTMCNC: 33.7 G/DL (ref 31.4–35)
MCHC RBC AUTO-ENTMCNC: 34.2 G/DL (ref 31.4–35)
MCHC RBC AUTO-ENTMCNC: 34.3 G/DL (ref 31.4–35)
MCHC RBC AUTO-ENTMCNC: 34.4 G/DL (ref 31.4–35)
MCHC RBC AUTO-ENTMCNC: 34.4 G/DL (ref 31.4–35)
MCHC RBC AUTO-ENTMCNC: 34.6 G/DL (ref 31.4–35)
MCV RBC AUTO: 83.7 FL (ref 79.6–97.8)
MCV RBC AUTO: 84.1 FL (ref 79.6–97.8)
MCV RBC AUTO: 85 FL (ref 79.6–97.8)
MCV RBC AUTO: 85 FL (ref 79.6–97.8)
MCV RBC AUTO: 85.8 FL (ref 79.6–97.8)
MCV RBC AUTO: 86 FL (ref 79.6–97.8)
MCV RBC AUTO: 86.1 FL (ref 79.6–97.8)
MCV RBC AUTO: 86.2 FL (ref 79.6–97.8)
MCV RBC AUTO: 86.6 FL (ref 79.6–97.8)
MCV RBC AUTO: 86.6 FL (ref 79.6–97.8)
MCV RBC AUTO: 87.1 FL (ref 79.6–97.8)
MCV RBC AUTO: 87.1 FL (ref 79.6–97.8)
MCV RBC AUTO: 87.2 FL (ref 79.6–97.8)
MCV RBC AUTO: 87.2 FL (ref 79.6–97.8)
MCV RBC AUTO: 87.3 FL (ref 79.6–97.8)
MCV RBC AUTO: 87.9 FL (ref 79.6–97.8)
MCV RBC AUTO: 88.5 FL (ref 79.6–97.8)
MCV RBC AUTO: 90.2 FL (ref 79.6–97.8)
MCV RBC AUTO: 90.3 FL (ref 79.6–97.8)
MCV RBC AUTO: 90.5 FL (ref 79.6–97.8)
MCV RBC AUTO: 90.8 FL (ref 79.6–97.8)
MM INDURATION POC: NORMAL MM (ref 0–5)
MM INDURATION POC: NORMAL MM (ref 0–5)
MONOCYTES # BLD: 0.4 K/UL (ref 0.1–1.3)
MONOCYTES # BLD: 0.9 K/UL (ref 0.1–1.3)
MONOCYTES # BLD: 1 K/UL (ref 0.1–1.3)
MONOCYTES # BLD: 1 K/UL (ref 0.1–1.3)
MONOCYTES # BLD: 1.2 K/UL (ref 0.1–1.3)
MONOCYTES # BLD: 1.4 K/UL (ref 0.1–1.3)
MONOCYTES # BLD: 1.4 K/UL (ref 0.1–1.3)
MONOCYTES # BLD: 1.5 K/UL (ref 0.1–1.3)
MONOCYTES # BLD: 1.5 K/UL (ref 0.1–1.3)
MONOCYTES # BLD: 1.6 K/UL (ref 0.1–1.3)
MONOCYTES # BLD: 1.7 K/UL (ref 0.1–1.3)
MONOCYTES # BLD: 1.9 K/UL (ref 0.1–1.3)
MONOCYTES # BLD: 2.2 K/UL (ref 0.1–1.3)
MONOCYTES # BLD: 2.2 K/UL (ref 0.1–1.3)
MONOCYTES NFR BLD: 11 % (ref 4–12)
MONOCYTES NFR BLD: 11 % (ref 4–12)
MONOCYTES NFR BLD: 12 % (ref 4–12)
MONOCYTES NFR BLD: 12 % (ref 4–12)
MONOCYTES NFR BLD: 15 % (ref 4–12)
MONOCYTES NFR BLD: 17 % (ref 4–12)
MONOCYTES NFR BLD: 18 % (ref 4–12)
MONOCYTES NFR BLD: 18 % (ref 4–12)
MONOCYTES NFR BLD: 19 % (ref 4–12)
MONOCYTES NFR BLD: 19 % (ref 4–12)
MONOCYTES NFR BLD: 20 % (ref 4–12)
MONOCYTES NFR BLD: 20 % (ref 4–12)
MONOCYTES NFR BLD: 23 % (ref 4–12)
MONOCYTES NFR BLD: 24 % (ref 4–12)
MONOS+MACROS NFR FLD: 1 %
MONOS+MACROS NFR FLD: 1 %
MONOS+MACROS NFR FLD: 10 %
MONOS+MACROS NFR FLD: 2 %
MONOS+MACROS NFR FLD: 24 %
NEUTROPHILS NFR FLD: 1 %
NEUTROPHILS NFR FLD: 2 %
NEUTROPHILS NFR FLD: 3 %
NEUTROPHILS NFR FLD: 4 %
NEUTS SEG # BLD: 10.3 K/UL (ref 1.7–8.2)
NEUTS SEG # BLD: 11.2 K/UL (ref 1.7–8.2)
NEUTS SEG # BLD: 12.8 K/UL (ref 1.7–8.2)
NEUTS SEG # BLD: 3.3 K/UL (ref 1.7–8.2)
NEUTS SEG # BLD: 3.6 K/UL (ref 1.7–8.2)
NEUTS SEG # BLD: 3.8 K/UL (ref 1.7–8.2)
NEUTS SEG # BLD: 4 K/UL (ref 1.7–8.2)
NEUTS SEG # BLD: 4.2 K/UL (ref 1.7–8.2)
NEUTS SEG # BLD: 4.6 K/UL (ref 1.7–8.2)
NEUTS SEG # BLD: 5.1 K/UL (ref 1.7–8.2)
NEUTS SEG # BLD: 5.6 K/UL (ref 1.7–8.2)
NEUTS SEG # BLD: 5.9 K/UL (ref 1.7–8.2)
NEUTS SEG # BLD: 7.1 K/UL (ref 1.7–8.2)
NEUTS SEG # BLD: 8.5 K/UL (ref 1.7–8.2)
NEUTS SEG NFR BLD: 65 % (ref 43–78)
NEUTS SEG NFR BLD: 65 % (ref 43–78)
NEUTS SEG NFR BLD: 66 % (ref 43–78)
NEUTS SEG NFR BLD: 66 % (ref 43–78)
NEUTS SEG NFR BLD: 67 % (ref 43–78)
NEUTS SEG NFR BLD: 67 % (ref 43–78)
NEUTS SEG NFR BLD: 70 % (ref 43–78)
NEUTS SEG NFR BLD: 74 % (ref 43–78)
NEUTS SEG NFR BLD: 76 % (ref 43–78)
NEUTS SEG NFR BLD: 79 % (ref 43–78)
NEUTS SEG NFR BLD: 79 % (ref 43–78)
NEUTS SEG NFR BLD: 83 % (ref 43–78)
NEUTS SEG NFR BLD: 83 % (ref 43–78)
NEUTS SEG NFR BLD: 85 % (ref 43–78)
NRBC # BLD: 0 K/UL (ref 0–0.2)
NUC CELL # FLD: 116 /CU MM
NUC CELL # FLD: 128 /CU MM
NUC CELL # FLD: 136 /CU MM
NUC CELL # FLD: 175 /CU MM
NUC CELL # FLD: 199 /CU MM
NUC CELL # FLD: 83 /CU MM
OSMOLALITY SERPL: 254 MOSM/KG H2O (ref 280–301)
OSMOLALITY UR: 304 MOSM/KG H2O (ref 50–1400)
OTHER CELLS NFR BLD MANUAL: 36 %
PLATELET # BLD AUTO: 122 K/UL (ref 150–450)
PLATELET # BLD AUTO: 136 K/UL (ref 150–450)
PLATELET # BLD AUTO: 138 K/UL (ref 150–450)
PLATELET # BLD AUTO: 149 K/UL (ref 150–450)
PLATELET # BLD AUTO: 162 K/UL (ref 150–450)
PLATELET # BLD AUTO: 164 K/UL (ref 150–450)
PLATELET # BLD AUTO: 169 K/UL (ref 150–450)
PLATELET # BLD AUTO: 178 K/UL (ref 150–450)
PLATELET # BLD AUTO: 181 K/UL (ref 150–450)
PLATELET # BLD AUTO: 182 K/UL (ref 150–450)
PLATELET # BLD AUTO: 184 K/UL (ref 150–450)
PLATELET # BLD AUTO: 188 K/UL (ref 150–450)
PLATELET # BLD AUTO: 190 K/UL (ref 150–450)
PLATELET # BLD AUTO: 191 K/UL (ref 150–450)
PLATELET # BLD AUTO: 202 K/UL (ref 150–450)
PLATELET # BLD AUTO: 204 K/UL (ref 150–450)
PLATELET # BLD AUTO: 208 K/UL (ref 150–450)
PLATELET # BLD AUTO: 214 K/UL (ref 150–450)
PLATELET # BLD AUTO: 220 K/UL (ref 150–450)
PLATELET # BLD AUTO: 225 K/UL (ref 150–450)
PLATELET # BLD AUTO: 8 K/UL (ref 150–450)
PMV BLD AUTO: 10 FL (ref 9.4–12.3)
PMV BLD AUTO: 10.1 FL (ref 9.4–12.3)
PMV BLD AUTO: 10.2 FL (ref 9.4–12.3)
PMV BLD AUTO: 10.3 FL (ref 9.4–12.3)
PMV BLD AUTO: 10.3 FL (ref 9.4–12.3)
PMV BLD AUTO: 10.4 FL (ref 9.4–12.3)
PMV BLD AUTO: 10.4 FL (ref 9.4–12.3)
PMV BLD AUTO: 10.5 FL (ref 9.4–12.3)
PMV BLD AUTO: 10.5 FL (ref 9.4–12.3)
PMV BLD AUTO: 10.6 FL (ref 9.4–12.3)
PMV BLD AUTO: 10.7 FL (ref 9.4–12.3)
PMV BLD AUTO: 10.8 FL (ref 9.4–12.3)
PMV BLD AUTO: 10.9 FL (ref 9.4–12.3)
PMV BLD AUTO: 10.9 FL (ref 9.4–12.3)
PMV BLD AUTO: 11.2 FL (ref 9.4–12.3)
PMV BLD AUTO: 11.3 FL (ref 9.4–12.3)
PMV BLD AUTO: 9.7 FL (ref 9.4–12.3)
PMV BLD AUTO: 9.9 FL (ref 9.4–12.3)
PMV BLD AUTO: ABNORMAL FL (ref 9.4–12.3)
POTASSIUM SERPL-SCNC: 3.5 MMOL/L (ref 3.5–5.1)
POTASSIUM SERPL-SCNC: 3.6 MMOL/L (ref 3.5–5.1)
POTASSIUM SERPL-SCNC: 3.6 MMOL/L (ref 3.5–5.1)
POTASSIUM SERPL-SCNC: 3.7 MMOL/L (ref 3.5–5.1)
POTASSIUM SERPL-SCNC: 3.9 MMOL/L (ref 3.5–5.1)
POTASSIUM SERPL-SCNC: 4 MMOL/L (ref 3.5–5.1)
POTASSIUM SERPL-SCNC: 4.1 MMOL/L (ref 3.5–5.1)
POTASSIUM SERPL-SCNC: 4.1 MMOL/L (ref 3.5–5.1)
POTASSIUM SERPL-SCNC: 4.2 MMOL/L (ref 3.5–5.1)
POTASSIUM SERPL-SCNC: 4.4 MMOL/L (ref 3.5–5.1)
POTASSIUM SERPL-SCNC: 4.6 MMOL/L (ref 3.5–5.1)
POTASSIUM SERPL-SCNC: 4.6 MMOL/L (ref 3.5–5.1)
POTASSIUM SERPL-SCNC: 4.7 MMOL/L (ref 3.5–5.1)
POTASSIUM SERPL-SCNC: 4.8 MMOL/L (ref 3.5–5.1)
POTASSIUM SERPL-SCNC: 4.9 MMOL/L (ref 3.5–5.1)
POTASSIUM SERPL-SCNC: 4.9 MMOL/L (ref 3.5–5.1)
POTASSIUM SERPL-SCNC: 5.1 MMOL/L (ref 3.5–5.1)
POTASSIUM SERPL-SCNC: 5.1 MMOL/L (ref 3.5–5.1)
POTASSIUM SERPL-SCNC: 5.6 MMOL/L (ref 3.5–5.1)
POTASSIUM SERPL-SCNC: 5.6 MMOL/L (ref 3.5–5.1)
POTASSIUM SERPL-SCNC: 6 MMOL/L (ref 3.5–5.1)
POTASSIUM SERPL-SCNC: 6.1 MMOL/L (ref 3.5–5.1)
PPD POC: NORMAL NEGATIVE
PPD POC: NORMAL NEGATIVE
PROCALCITONIN SERPL-MCNC: 1.9 NG/ML
PROT SERPL-MCNC: 5.7 G/DL (ref 6.3–8.2)
PROT SERPL-MCNC: 5.8 G/DL (ref 6.3–8.2)
PROT SERPL-MCNC: 6.1 G/DL (ref 6.3–8.2)
PROT SERPL-MCNC: 6.2 G/DL (ref 6.3–8.2)
PROT SERPL-MCNC: 6.2 G/DL (ref 6.3–8.2)
PROT SERPL-MCNC: 6.3 G/DL (ref 6.3–8.2)
PROT SERPL-MCNC: 6.4 G/DL
PROT SERPL-MCNC: 6.4 G/DL (ref 6.3–8.2)
PROT SERPL-MCNC: 6.4 G/DL (ref 6.3–8.2)
PROT SERPL-MCNC: 6.5 G/DL (ref 6.3–8.2)
PROT SERPL-MCNC: 6.7 G/DL (ref 6.3–8.2)
PROT SERPL-MCNC: 6.8 G/DL (ref 6.3–8.2)
PROT SERPL-MCNC: 7 G/DL (ref 6.3–8.2)
PROTHROMBIN TIME: 14.3 SEC (ref 11.7–14.5)
PROTHROMBIN TIME: 15.7 SEC (ref 11.7–14.5)
PROTHROMBIN TIME: 16.1 SEC (ref 11.7–14.5)
PROTHROMBIN TIME: 16.5 SEC (ref 11.7–14.5)
PROTHROMBIN TIME: 17.2 SEC (ref 11.7–14.5)
PROTHROMBIN TIME: 17.5 SEC (ref 11.7–14.5)
PROTHROMBIN TIME: 17.5 SEC (ref 11.7–14.5)
Q-T INTERVAL, ECG07: 336 MS
QRS DURATION, ECG06: 62 MS
QTC CALCULATION (BEZET), ECG08: 402 MS
RBC # BLD AUTO: 2.78 M/UL (ref 4.23–5.6)
RBC # BLD AUTO: 2.81 M/UL (ref 4.23–5.6)
RBC # BLD AUTO: 2.83 M/UL (ref 4.23–5.6)
RBC # BLD AUTO: 2.84 M/UL (ref 4.23–5.6)
RBC # BLD AUTO: 2.88 M/UL (ref 4.23–5.6)
RBC # BLD AUTO: 2.95 M/UL (ref 4.23–5.6)
RBC # BLD AUTO: 3.06 M/UL (ref 4.23–5.6)
RBC # BLD AUTO: 3.08 M/UL (ref 4.23–5.6)
RBC # BLD AUTO: 3.12 M/UL (ref 4.23–5.6)
RBC # BLD AUTO: 3.13 M/UL (ref 4.23–5.6)
RBC # BLD AUTO: 3.21 M/UL (ref 4.23–5.6)
RBC # BLD AUTO: 3.21 M/UL (ref 4.23–5.6)
RBC # BLD AUTO: 3.25 M/UL (ref 4.23–5.6)
RBC # BLD AUTO: 3.26 M/UL (ref 4.23–5.6)
RBC # BLD AUTO: 3.26 M/UL (ref 4.23–5.67)
RBC # BLD AUTO: 3.33 M/UL (ref 4.23–5.6)
RBC # BLD AUTO: 3.43 M/UL (ref 4.23–5.6)
RBC # BLD AUTO: 3.74 M/UL (ref 4.23–5.6)
RBC # BLD AUTO: 3.82 M/UL (ref 4.23–5.6)
RBC # BLD AUTO: 3.92 M/UL (ref 4.23–5.6)
RBC # BLD AUTO: 4.07 M/UL (ref 4.23–5.6)
RBC # FLD: 1000 /CU MM
RBC # FLD: 1000 /CU MM
RBC # FLD: <1000 /CU MM
RBC # FLD: NORMAL /CU MM
RETICS # AUTO: 0.11 M/UL (ref 0.03–0.1)
RETICS/RBC NFR AUTO: 3.7 % (ref 0.3–2)
SERVICE CMNT-IMP: ABNORMAL
SERVICE CMNT-IMP: NORMAL
SODIUM SERPL-SCNC: 118 MMOL/L (ref 136–145)
SODIUM SERPL-SCNC: 119 MMOL/L (ref 136–145)
SODIUM SERPL-SCNC: 120 MMOL/L (ref 136–145)
SODIUM SERPL-SCNC: 121 MMOL/L (ref 136–145)
SODIUM SERPL-SCNC: 122 MMOL/L (ref 136–145)
SODIUM SERPL-SCNC: 123 MMOL/L (ref 136–145)
SODIUM SERPL-SCNC: 126 MMOL/L (ref 136–145)
SODIUM SERPL-SCNC: 127 MMOL/L (ref 136–145)
SODIUM SERPL-SCNC: 128 MMOL/L (ref 136–145)
SODIUM SERPL-SCNC: 130 MMOL/L (ref 136–145)
SODIUM SERPL-SCNC: 131 MMOL/L (ref 136–145)
SODIUM SERPL-SCNC: 131 MMOL/L (ref 136–145)
SODIUM SERPL-SCNC: 133 MMOL/L (ref 136–145)
SODIUM SERPL-SCNC: 134 MMOL/L (ref 136–145)
SODIUM SERPL-SCNC: 135 MMOL/L (ref 136–145)
SODIUM SERPL-SCNC: 136 MMOL/L (ref 136–145)
SODIUM SERPL-SCNC: 136 MMOL/L (ref 136–145)
SODIUM SERPL-SCNC: 137 MMOL/L (ref 136–145)
SODIUM UR-SCNC: 34 MMOL/L
SPECIMEN EXP DATE BLD: NORMAL
SPECIMEN EXP DATE BLD: NORMAL
SPECIMEN SOURCE FLD: NORMAL
STATUS OF UNIT,%ST: NORMAL
TROPONIN I SERPL-MCNC: <0.02 NG/ML (ref 0.02–0.05)
UNIT DIVISION, %UDIV: 0
VENTRICULAR RATE, ECG03: 86 BPM
WBC # BLD AUTO: 11.5 K/UL (ref 4.3–11.1)
WBC # BLD AUTO: 12.1 K/UL (ref 4.3–11.1)
WBC # BLD AUTO: 14.2 K/UL (ref 4.3–11.1)
WBC # BLD AUTO: 15.5 K/UL (ref 4.3–11.1)
WBC # BLD AUTO: 4.1 K/UL (ref 4.3–11.1)
WBC # BLD AUTO: 5.5 K/UL (ref 4.3–11.1)
WBC # BLD AUTO: 5.6 K/UL (ref 4.3–11.1)
WBC # BLD AUTO: 5.8 K/UL (ref 4.3–11.1)
WBC # BLD AUTO: 5.9 K/UL (ref 4.3–11.1)
WBC # BLD AUTO: 6 K/UL (ref 4.3–11.1)
WBC # BLD AUTO: 6 K/UL (ref 4.3–11.1)
WBC # BLD AUTO: 6.3 K/UL (ref 4.3–11.1)
WBC # BLD AUTO: 6.5 K/UL (ref 4.3–11.1)
WBC # BLD AUTO: 6.9 K/UL (ref 4.3–11.1)
WBC # BLD AUTO: 7.1 K/UL (ref 4.3–11.1)
WBC # BLD AUTO: 7.1 K/UL (ref 4.3–11.1)
WBC # BLD AUTO: 7.6 K/UL (ref 4.3–11.1)
WBC # BLD AUTO: 7.9 K/UL (ref 4.3–11.1)
WBC # BLD AUTO: 8 K/UL (ref 4.3–11.1)
WBC # BLD AUTO: 9.4 K/UL (ref 4.3–11.1)
WBC # BLD AUTO: 9.8 K/UL (ref 4.3–11.1)

## 2019-01-01 PROCEDURE — 36415 COLL VENOUS BLD VENIPUNCTURE: CPT

## 2019-01-01 PROCEDURE — 85025 COMPLETE CBC W/AUTO DIFF WBC: CPT

## 2019-01-01 PROCEDURE — 85730 THROMBOPLASTIN TIME PARTIAL: CPT

## 2019-01-01 PROCEDURE — 74011250636 HC RX REV CODE- 250/636: Performed by: INTERNAL MEDICINE

## 2019-01-01 PROCEDURE — 81003 URINALYSIS AUTO W/O SCOPE: CPT | Performed by: EMERGENCY MEDICINE

## 2019-01-01 PROCEDURE — 85027 COMPLETE CBC AUTOMATED: CPT

## 2019-01-01 PROCEDURE — 65270000029 HC RM PRIVATE

## 2019-01-01 PROCEDURE — 77030014146 HC TY THORCENT/PARACENT BD -B

## 2019-01-01 PROCEDURE — 96375 TX/PRO/DX INJ NEW DRUG ADDON: CPT | Performed by: EMERGENCY MEDICINE

## 2019-01-01 PROCEDURE — 74011000250 HC RX REV CODE- 250: Performed by: INTERNAL MEDICINE

## 2019-01-01 PROCEDURE — 73110 X-RAY EXAM OF WRIST: CPT

## 2019-01-01 PROCEDURE — 82140 ASSAY OF AMMONIA: CPT

## 2019-01-01 PROCEDURE — 83930 ASSAY OF BLOOD OSMOLALITY: CPT

## 2019-01-01 PROCEDURE — 70360 X-RAY EXAM OF NECK: CPT

## 2019-01-01 PROCEDURE — 74011250637 HC RX REV CODE- 250/637: Performed by: INTERNAL MEDICINE

## 2019-01-01 PROCEDURE — 99285 EMERGENCY DEPT VISIT HI MDM: CPT | Performed by: EMERGENCY MEDICINE

## 2019-01-01 PROCEDURE — P9047 ALBUMIN (HUMAN), 25%, 50ML: HCPCS

## 2019-01-01 PROCEDURE — 74018 RADEX ABDOMEN 1 VIEW: CPT

## 2019-01-01 PROCEDURE — 74011250636 HC RX REV CODE- 250/636

## 2019-01-01 PROCEDURE — 85610 PROTHROMBIN TIME: CPT

## 2019-01-01 PROCEDURE — 74011250637 HC RX REV CODE- 250/637: Performed by: NURSE PRACTITIONER

## 2019-01-01 PROCEDURE — 99152 MOD SED SAME PHYS/QHP 5/>YRS: CPT | Performed by: INTERNAL MEDICINE

## 2019-01-01 PROCEDURE — 80076 HEPATIC FUNCTION PANEL: CPT

## 2019-01-01 PROCEDURE — 77030037400 HC ADH TISS HI VISC EXOFIN CHMP -B

## 2019-01-01 PROCEDURE — 71250 CT THORAX DX C-: CPT

## 2019-01-01 PROCEDURE — 99222 1ST HOSP IP/OBS MODERATE 55: CPT | Performed by: INTERNAL MEDICINE

## 2019-01-01 PROCEDURE — 49083 ABD PARACENTESIS W/IMAGING: CPT

## 2019-01-01 PROCEDURE — 83605 ASSAY OF LACTIC ACID: CPT

## 2019-01-01 PROCEDURE — 80053 COMPREHEN METABOLIC PANEL: CPT

## 2019-01-01 PROCEDURE — P9047 ALBUMIN (HUMAN), 25%, 50ML: HCPCS | Performed by: INTERNAL MEDICINE

## 2019-01-01 PROCEDURE — 74011250636 HC RX REV CODE- 250/636: Performed by: HOSPITALIST

## 2019-01-01 PROCEDURE — 85379 FIBRIN DEGRADATION QUANT: CPT

## 2019-01-01 PROCEDURE — P9035 PLATELET PHERES LEUKOREDUCED: HCPCS

## 2019-01-01 PROCEDURE — 87205 SMEAR GRAM STAIN: CPT

## 2019-01-01 PROCEDURE — 84484 ASSAY OF TROPONIN QUANT: CPT

## 2019-01-01 PROCEDURE — 80048 BASIC METABOLIC PNL TOTAL CA: CPT

## 2019-01-01 PROCEDURE — P9047 ALBUMIN (HUMAN), 25%, 50ML: HCPCS | Performed by: RADIOLOGY

## 2019-01-01 PROCEDURE — 99284 EMERGENCY DEPT VISIT MOD MDM: CPT | Performed by: EMERGENCY MEDICINE

## 2019-01-01 PROCEDURE — 97530 THERAPEUTIC ACTIVITIES: CPT

## 2019-01-01 PROCEDURE — 83735 ASSAY OF MAGNESIUM: CPT

## 2019-01-01 PROCEDURE — P9047 ALBUMIN (HUMAN), 25%, 50ML: HCPCS | Performed by: PHYSICIAN ASSISTANT

## 2019-01-01 PROCEDURE — 74011250636 HC RX REV CODE- 250/636: Performed by: FAMILY MEDICINE

## 2019-01-01 PROCEDURE — 31622 DX BRONCHOSCOPE/WASH: CPT | Performed by: INTERNAL MEDICINE

## 2019-01-01 PROCEDURE — 96374 THER/PROPH/DIAG INJ IV PUSH: CPT | Performed by: EMERGENCY MEDICINE

## 2019-01-01 PROCEDURE — 74176 CT ABD & PELVIS W/O CONTRAST: CPT

## 2019-01-01 PROCEDURE — 74011250636 HC RX REV CODE- 250/636: Performed by: EMERGENCY MEDICINE

## 2019-01-01 PROCEDURE — P9047 ALBUMIN (HUMAN), 25%, 50ML: HCPCS | Performed by: HOSPITALIST

## 2019-01-01 PROCEDURE — 36430 TRANSFUSION BLD/BLD COMPNT: CPT

## 2019-01-01 PROCEDURE — 74011250637 HC RX REV CODE- 250/637: Performed by: HOSPITALIST

## 2019-01-01 PROCEDURE — 80307 DRUG TEST PRSMV CHEM ANLYZR: CPT

## 2019-01-01 PROCEDURE — 82105 ALPHA-FETOPROTEIN SERUM: CPT

## 2019-01-01 PROCEDURE — 85384 FIBRINOGEN ACTIVITY: CPT

## 2019-01-01 PROCEDURE — 86900 BLOOD TYPING SEROLOGIC ABO: CPT

## 2019-01-01 PROCEDURE — 74011000258 HC RX REV CODE- 258: Performed by: HOSPITALIST

## 2019-01-01 PROCEDURE — 89050 BODY FLUID CELL COUNT: CPT

## 2019-01-01 PROCEDURE — 94640 AIRWAY INHALATION TREATMENT: CPT

## 2019-01-01 PROCEDURE — 74011250637 HC RX REV CODE- 250/637: Performed by: FAMILY MEDICINE

## 2019-01-01 PROCEDURE — 74011636320 HC RX REV CODE- 636/320: Performed by: EMERGENCY MEDICINE

## 2019-01-01 PROCEDURE — 76705 ECHO EXAM OF ABDOMEN: CPT

## 2019-01-01 PROCEDURE — C1729 CATH, DRAINAGE: HCPCS

## 2019-01-01 PROCEDURE — 76040000019: Performed by: INTERNAL MEDICINE

## 2019-01-01 PROCEDURE — 84300 ASSAY OF URINE SODIUM: CPT

## 2019-01-01 PROCEDURE — 74011250636 HC RX REV CODE- 250/636: Performed by: PHYSICIAN ASSISTANT

## 2019-01-01 PROCEDURE — 0W9G3ZZ DRAINAGE OF PERITONEAL CAVITY, PERCUTANEOUS APPROACH: ICD-10-PCS | Performed by: RADIOLOGY

## 2019-01-01 PROCEDURE — 74011000250 HC RX REV CODE- 250: Performed by: EMERGENCY MEDICINE

## 2019-01-01 PROCEDURE — 86708 HEPATITIS A ANTIBODY: CPT

## 2019-01-01 PROCEDURE — 74011250637 HC RX REV CODE- 250/637: Performed by: EMERGENCY MEDICINE

## 2019-01-01 PROCEDURE — 82248 BILIRUBIN DIRECT: CPT

## 2019-01-01 PROCEDURE — 76700 US EXAM ABDOM COMPLETE: CPT

## 2019-01-01 PROCEDURE — 77010033678 HC OXYGEN DAILY

## 2019-01-01 PROCEDURE — 70450 CT HEAD/BRAIN W/O DYE: CPT

## 2019-01-01 PROCEDURE — 97165 OT EVAL LOW COMPLEX 30 MIN: CPT

## 2019-01-01 PROCEDURE — 83615 LACTATE (LD) (LDH) ENZYME: CPT

## 2019-01-01 PROCEDURE — 77030039270 HC TU BLD FLTR CARD -A

## 2019-01-01 PROCEDURE — 94760 N-INVAS EAR/PLS OXIMETRY 1: CPT

## 2019-01-01 PROCEDURE — 77030027688 HC DRSG MEPILEX 16-48IN NO BORD MOLN -A

## 2019-01-01 PROCEDURE — 86580 TB INTRADERMAL TEST: CPT | Performed by: HOSPITALIST

## 2019-01-01 PROCEDURE — 97110 THERAPEUTIC EXERCISES: CPT

## 2019-01-01 PROCEDURE — 77030012699 HC VLV SUC CNTRL OCOA -A: Performed by: INTERNAL MEDICINE

## 2019-01-01 PROCEDURE — 97161 PT EVAL LOW COMPLEX 20 MIN: CPT

## 2019-01-01 PROCEDURE — 74011000258 HC RX REV CODE- 258: Performed by: EMERGENCY MEDICINE

## 2019-01-01 PROCEDURE — 70551 MRI BRAIN STEM W/O DYE: CPT

## 2019-01-01 PROCEDURE — 83935 ASSAY OF URINE OSMOLALITY: CPT

## 2019-01-01 PROCEDURE — 77030020263 HC SOL INJ SOD CL0.9% LFCR 1000ML

## 2019-01-01 PROCEDURE — 30233N1 TRANSFUSION OF NONAUTOLOGOUS RED BLOOD CELLS INTO PERIPHERAL VEIN, PERCUTANEOUS APPROACH: ICD-10-PCS | Performed by: INTERNAL MEDICINE

## 2019-01-01 PROCEDURE — 71045 X-RAY EXAM CHEST 1 VIEW: CPT

## 2019-01-01 PROCEDURE — 83880 ASSAY OF NATRIURETIC PEPTIDE: CPT

## 2019-01-01 PROCEDURE — 93005 ELECTROCARDIOGRAM TRACING: CPT | Performed by: EMERGENCY MEDICINE

## 2019-01-01 PROCEDURE — 77030011256 HC DRSG MEPILEX <16IN NO BORD MOLN -A

## 2019-01-01 PROCEDURE — 77030028114 HC DRN KT PLEURX SYS BD -B

## 2019-01-01 PROCEDURE — 83690 ASSAY OF LIPASE: CPT

## 2019-01-01 PROCEDURE — 74011636637 HC RX REV CODE- 636/637: Performed by: NURSE PRACTITIONER

## 2019-01-01 PROCEDURE — 87186 SC STD MICRODIL/AGAR DIL: CPT

## 2019-01-01 PROCEDURE — 99221 1ST HOSP IP/OBS SF/LOW 40: CPT | Performed by: PHYSICAL MEDICINE & REHABILITATION

## 2019-01-01 PROCEDURE — 74177 CT ABD & PELVIS W/CONTRAST: CPT

## 2019-01-01 PROCEDURE — 84145 PROCALCITONIN (PCT): CPT

## 2019-01-01 PROCEDURE — 74011000302 HC RX REV CODE- 302: Performed by: HOSPITALIST

## 2019-01-01 PROCEDURE — P9016 RBC LEUKOCYTES REDUCED: HCPCS

## 2019-01-01 PROCEDURE — 71046 X-RAY EXAM CHEST 2 VIEWS: CPT

## 2019-01-01 PROCEDURE — 86706 HEP B SURFACE ANTIBODY: CPT

## 2019-01-01 PROCEDURE — 85046 RETICYTE/HGB CONCENTRATE: CPT

## 2019-01-01 PROCEDURE — 30233R1 TRANSFUSION OF NONAUTOLOGOUS PLATELETS INTO PERIPHERAL VEIN, PERCUTANEOUS APPROACH: ICD-10-PCS | Performed by: INTERNAL MEDICINE

## 2019-01-01 PROCEDURE — 87040 BLOOD CULTURE FOR BACTERIA: CPT

## 2019-01-01 PROCEDURE — P9047 ALBUMIN (HUMAN), 25%, 50ML: HCPCS | Performed by: FAMILY MEDICINE

## 2019-01-01 PROCEDURE — 94664 DEMO&/EVAL PT USE INHALER: CPT

## 2019-01-01 PROCEDURE — 83010 ASSAY OF HAPTOGLOBIN QUANT: CPT

## 2019-01-01 PROCEDURE — 74011000258 HC RX REV CODE- 258: Performed by: FAMILY MEDICINE

## 2019-01-01 PROCEDURE — 87077 CULTURE AEROBIC IDENTIFY: CPT

## 2019-01-01 PROCEDURE — 86923 COMPATIBILITY TEST ELECTRIC: CPT

## 2019-01-01 PROCEDURE — 74011250636 HC RX REV CODE- 250/636: Performed by: RADIOLOGY

## 2019-01-01 PROCEDURE — 74011000250 HC RX REV CODE- 250

## 2019-01-01 PROCEDURE — 93970 EXTREMITY STUDY: CPT

## 2019-01-01 PROCEDURE — 93971 EXTREMITY STUDY: CPT

## 2019-01-01 RX ORDER — FUROSEMIDE 10 MG/ML
80 INJECTION INTRAMUSCULAR; INTRAVENOUS
Status: COMPLETED | OUTPATIENT
Start: 2019-01-01 | End: 2019-01-01

## 2019-01-01 RX ORDER — FUROSEMIDE 10 MG/ML
40 INJECTION INTRAMUSCULAR; INTRAVENOUS DAILY
Status: DISCONTINUED | OUTPATIENT
Start: 2019-01-01 | End: 2019-01-01 | Stop reason: HOSPADM

## 2019-01-01 RX ORDER — FENTANYL CITRATE 50 UG/ML
50 INJECTION, SOLUTION INTRAMUSCULAR; INTRAVENOUS
Status: DISCONTINUED | OUTPATIENT
Start: 2019-01-01 | End: 2019-01-01 | Stop reason: HOSPADM

## 2019-01-01 RX ORDER — ALBUMIN HUMAN 250 G/1000ML
12.5 SOLUTION INTRAVENOUS ONCE
Status: COMPLETED | OUTPATIENT
Start: 2019-01-01 | End: 2019-01-01

## 2019-01-01 RX ORDER — PANTOPRAZOLE SODIUM 40 MG/1
40 TABLET, DELAYED RELEASE ORAL DAILY
COMMUNITY

## 2019-01-01 RX ORDER — ALBUMIN HUMAN 250 G/1000ML
100 SOLUTION INTRAVENOUS ONCE
Status: DISCONTINUED | OUTPATIENT
Start: 2019-01-01 | End: 2019-01-01

## 2019-01-01 RX ORDER — ALBUMIN HUMAN 250 G/1000ML
12.5 SOLUTION INTRAVENOUS EVERY 6 HOURS
Status: DISCONTINUED | OUTPATIENT
Start: 2019-01-01 | End: 2019-01-01 | Stop reason: HOSPADM

## 2019-01-01 RX ORDER — SODIUM CHLORIDE 9 MG/ML
1000 INJECTION, SOLUTION INTRAVENOUS CONTINUOUS
Status: DISCONTINUED | OUTPATIENT
Start: 2019-01-01 | End: 2019-01-01 | Stop reason: HOSPADM

## 2019-01-01 RX ORDER — FUROSEMIDE 10 MG/ML
40 INJECTION INTRAMUSCULAR; INTRAVENOUS 2 TIMES DAILY
Status: DISCONTINUED | OUTPATIENT
Start: 2019-01-01 | End: 2019-01-01

## 2019-01-01 RX ORDER — LIDOCAINE HYDROCHLORIDE 40 MG/ML
SOLUTION TOPICAL ONCE
Status: COMPLETED | OUTPATIENT
Start: 2019-01-01 | End: 2019-01-01

## 2019-01-01 RX ORDER — HEPARIN SODIUM 5000 [USP'U]/ML
5000 INJECTION, SOLUTION INTRAVENOUS; SUBCUTANEOUS EVERY 12 HOURS
Status: DISCONTINUED | OUTPATIENT
Start: 2019-01-01 | End: 2019-01-01 | Stop reason: HOSPADM

## 2019-01-01 RX ORDER — SODIUM CHLORIDE 9 MG/ML
100 INJECTION, SOLUTION INTRAVENOUS CONTINUOUS
Status: DISCONTINUED | OUTPATIENT
Start: 2019-01-01 | End: 2019-01-01

## 2019-01-01 RX ORDER — DEXAMETHASONE SODIUM PHOSPHATE 100 MG/10ML
10 INJECTION INTRAMUSCULAR; INTRAVENOUS
Status: COMPLETED | OUTPATIENT
Start: 2019-01-01 | End: 2019-01-01

## 2019-01-01 RX ORDER — ACETAMINOPHEN 325 MG/1
650 TABLET ORAL
Status: DISCONTINUED | OUTPATIENT
Start: 2019-01-01 | End: 2019-01-01 | Stop reason: HOSPADM

## 2019-01-01 RX ORDER — MAG HYDROX/ALUMINUM HYD/SIMETH 200-200-20
30 SUSPENSION, ORAL (FINAL DOSE FORM) ORAL
Status: COMPLETED | OUTPATIENT
Start: 2019-01-01 | End: 2019-01-01

## 2019-01-01 RX ORDER — CIPROFLOXACIN 500 MG/1
500 TABLET ORAL EVERY 24 HOURS
Status: DISCONTINUED | OUTPATIENT
Start: 2019-01-01 | End: 2019-01-01 | Stop reason: HOSPADM

## 2019-01-01 RX ORDER — SPIRONOLACTONE 100 MG/1
200 TABLET, FILM COATED ORAL DAILY
Qty: 60 TAB | Refills: 2 | Status: SHIPPED | OUTPATIENT
Start: 2019-01-01 | End: 2019-01-01

## 2019-01-01 RX ORDER — LIDOCAINE HYDROCHLORIDE 20 MG/ML
10 SOLUTION OROPHARYNGEAL ONCE
Status: COMPLETED | OUTPATIENT
Start: 2019-01-01 | End: 2019-01-01

## 2019-01-01 RX ORDER — PREDNISONE 20 MG/1
40 TABLET ORAL 2 TIMES DAILY WITH MEALS
Status: DISCONTINUED | OUTPATIENT
Start: 2019-01-01 | End: 2019-01-01

## 2019-01-01 RX ORDER — ALBUMIN HUMAN 250 G/1000ML
50 SOLUTION INTRAVENOUS ONCE
Status: COMPLETED | OUTPATIENT
Start: 2019-01-01 | End: 2019-01-01

## 2019-01-01 RX ORDER — SPIRONOLACTONE 50 MG/1
50 TABLET, FILM COATED ORAL DAILY
COMMUNITY

## 2019-01-01 RX ORDER — FLUMAZENIL 0.1 MG/ML
0.2 INJECTION INTRAVENOUS
Status: DISCONTINUED | OUTPATIENT
Start: 2019-01-01 | End: 2019-01-01 | Stop reason: HOSPADM

## 2019-01-01 RX ORDER — MIDAZOLAM HYDROCHLORIDE 1 MG/ML
.25-5 INJECTION, SOLUTION INTRAMUSCULAR; INTRAVENOUS
Status: DISCONTINUED | OUTPATIENT
Start: 2019-01-01 | End: 2019-01-01 | Stop reason: HOSPADM

## 2019-01-01 RX ORDER — FUROSEMIDE 40 MG/1
80 TABLET ORAL DAILY
Status: DISCONTINUED | OUTPATIENT
Start: 2019-01-01 | End: 2019-01-01 | Stop reason: HOSPADM

## 2019-01-01 RX ORDER — ALBUMIN HUMAN 250 G/1000ML
100 SOLUTION INTRAVENOUS ONCE
Status: COMPLETED | OUTPATIENT
Start: 2019-01-01 | End: 2019-01-01

## 2019-01-01 RX ORDER — SODIUM CHLORIDE 9 MG/ML
250 INJECTION, SOLUTION INTRAVENOUS AS NEEDED
Status: DISCONTINUED | OUTPATIENT
Start: 2019-01-01 | End: 2019-01-01 | Stop reason: HOSPADM

## 2019-01-01 RX ORDER — FUROSEMIDE 20 MG/1
20 TABLET ORAL DAILY
COMMUNITY

## 2019-01-01 RX ORDER — SODIUM CHLORIDE 9 MG/ML
500 INJECTION, SOLUTION INTRAVENOUS ONCE
Status: COMPLETED | OUTPATIENT
Start: 2019-01-01 | End: 2019-01-01

## 2019-01-01 RX ORDER — ALBUMIN HUMAN 50 G/1000ML
50 SOLUTION INTRAVENOUS ONCE
Status: DISCONTINUED | OUTPATIENT
Start: 2019-01-01 | End: 2019-01-01

## 2019-01-01 RX ORDER — SODIUM CHLORIDE 0.9 % (FLUSH) 0.9 %
5-40 SYRINGE (ML) INJECTION EVERY 8 HOURS
Status: DISCONTINUED | OUTPATIENT
Start: 2019-01-01 | End: 2019-01-01 | Stop reason: HOSPADM

## 2019-01-01 RX ORDER — IBUPROFEN 200 MG
TABLET ORAL
COMMUNITY
End: 2019-01-01

## 2019-01-01 RX ORDER — LANOLIN ALCOHOL/MO/W.PET/CERES
325 CREAM (GRAM) TOPICAL
Status: DISCONTINUED | OUTPATIENT
Start: 2019-01-01 | End: 2019-01-01 | Stop reason: HOSPADM

## 2019-01-01 RX ORDER — FUROSEMIDE 80 MG/1
80 TABLET ORAL DAILY
Qty: 30 TAB | Refills: 2 | Status: SHIPPED | OUTPATIENT
Start: 2019-01-01 | End: 2019-01-01

## 2019-01-01 RX ORDER — LANOLIN ALCOHOL/MO/W.PET/CERES
400 CREAM (GRAM) TOPICAL DAILY
Status: DISCONTINUED | OUTPATIENT
Start: 2019-01-01 | End: 2019-01-01 | Stop reason: HOSPADM

## 2019-01-01 RX ORDER — LANOLIN ALCOHOL/MO/W.PET/CERES
400 CREAM (GRAM) TOPICAL DAILY
Qty: 30 TAB | Refills: 2 | Status: SHIPPED | OUTPATIENT
Start: 2019-01-01 | End: 2019-01-01 | Stop reason: SDUPTHER

## 2019-01-01 RX ORDER — BUDESONIDE 0.5 MG/2ML
500 INHALANT ORAL
Status: DISCONTINUED | OUTPATIENT
Start: 2019-01-01 | End: 2019-01-01

## 2019-01-01 RX ORDER — SPIRONOLACTONE 25 MG/1
50 TABLET ORAL DAILY
Status: DISCONTINUED | OUTPATIENT
Start: 2019-01-01 | End: 2019-01-01

## 2019-01-01 RX ORDER — ZOLPIDEM TARTRATE 5 MG/1
5 TABLET ORAL
Status: DISCONTINUED | OUTPATIENT
Start: 2019-01-01 | End: 2019-01-01 | Stop reason: HOSPADM

## 2019-01-01 RX ORDER — FUROSEMIDE 10 MG/ML
60 INJECTION INTRAMUSCULAR; INTRAVENOUS
Status: COMPLETED | OUTPATIENT
Start: 2019-01-01 | End: 2019-01-01

## 2019-01-01 RX ORDER — SPIRONOLACTONE 25 MG/1
100 TABLET ORAL DAILY
Status: DISCONTINUED | OUTPATIENT
Start: 2019-01-01 | End: 2019-01-01 | Stop reason: HOSPADM

## 2019-01-01 RX ORDER — ADHESIVE BANDAGE
30 BANDAGE TOPICAL DAILY PRN
Status: DISCONTINUED | OUTPATIENT
Start: 2019-01-01 | End: 2019-01-01 | Stop reason: HOSPADM

## 2019-01-01 RX ORDER — SODIUM CHLORIDE 9 MG/ML
200 INJECTION, SOLUTION INTRAVENOUS CONTINUOUS
Status: DISCONTINUED | OUTPATIENT
Start: 2019-01-01 | End: 2019-01-01

## 2019-01-01 RX ORDER — LIDOCAINE HYDROCHLORIDE 20 MG/ML
1-10 INJECTION, SOLUTION INFILTRATION; PERINEURAL ONCE
Status: ACTIVE | OUTPATIENT
Start: 2019-01-01 | End: 2019-01-01

## 2019-01-01 RX ORDER — ALBUTEROL SULFATE 0.83 MG/ML
2.5 SOLUTION RESPIRATORY (INHALATION)
Status: DISCONTINUED | OUTPATIENT
Start: 2019-01-01 | End: 2019-01-01 | Stop reason: HOSPADM

## 2019-01-01 RX ORDER — PANTOPRAZOLE SODIUM 40 MG/1
40 TABLET, DELAYED RELEASE ORAL
Status: DISCONTINUED | OUTPATIENT
Start: 2019-01-01 | End: 2019-01-01 | Stop reason: HOSPADM

## 2019-01-01 RX ORDER — ALBUMIN HUMAN 250 G/1000ML
25 SOLUTION INTRAVENOUS ONCE
Status: COMPLETED | OUTPATIENT
Start: 2019-01-01 | End: 2019-01-01

## 2019-01-01 RX ORDER — HEPARIN SODIUM 5000 [USP'U]/ML
5000 INJECTION, SOLUTION INTRAVENOUS; SUBCUTANEOUS EVERY 8 HOURS
Status: DISCONTINUED | OUTPATIENT
Start: 2019-01-01 | End: 2019-01-01

## 2019-01-01 RX ORDER — MIRTAZAPINE 15 MG/1
15 TABLET, FILM COATED ORAL
Status: DISCONTINUED | OUTPATIENT
Start: 2019-01-01 | End: 2019-01-01 | Stop reason: HOSPADM

## 2019-01-01 RX ORDER — ONDANSETRON 2 MG/ML
4 INJECTION INTRAMUSCULAR; INTRAVENOUS
Status: DISCONTINUED | OUTPATIENT
Start: 2019-01-01 | End: 2019-01-01 | Stop reason: HOSPADM

## 2019-01-01 RX ORDER — ESCITALOPRAM OXALATE 10 MG/1
10 TABLET ORAL DAILY
Status: DISCONTINUED | OUTPATIENT
Start: 2019-01-01 | End: 2019-01-01 | Stop reason: HOSPADM

## 2019-01-01 RX ORDER — ALBUTEROL SULFATE 0.83 MG/ML
2.5 SOLUTION RESPIRATORY (INHALATION)
Status: DISCONTINUED | OUTPATIENT
Start: 2019-01-01 | End: 2019-01-01

## 2019-01-01 RX ORDER — ONDANSETRON 2 MG/ML
4 INJECTION INTRAMUSCULAR; INTRAVENOUS
Status: COMPLETED | OUTPATIENT
Start: 2019-01-01 | End: 2019-01-01

## 2019-01-01 RX ORDER — SODIUM POLYSTYRENE SULFONATE 15 G/60ML
15 SUSPENSION ORAL; RECTAL
Status: COMPLETED | OUTPATIENT
Start: 2019-01-01 | End: 2019-01-01

## 2019-01-01 RX ORDER — SODIUM CHLORIDE 0.9 % (FLUSH) 0.9 %
5-40 SYRINGE (ML) INJECTION EVERY 8 HOURS
Status: CANCELLED | OUTPATIENT
Start: 2019-01-01

## 2019-01-01 RX ORDER — FERROUS SULFATE 300 MG/5ML
27 LIQUID (ML) ORAL
Status: DISCONTINUED | OUTPATIENT
Start: 2019-01-01 | End: 2019-01-01 | Stop reason: HOSPADM

## 2019-01-01 RX ORDER — LIDOCAINE HYDROCHLORIDE 10 MG/ML
1-20 INJECTION INFILTRATION; PERINEURAL ONCE
Status: COMPLETED | OUTPATIENT
Start: 2019-01-01 | End: 2019-01-01

## 2019-01-01 RX ORDER — SODIUM CHLORIDE 0.9 % (FLUSH) 0.9 %
10 SYRINGE (ML) INJECTION
Status: COMPLETED | OUTPATIENT
Start: 2019-01-01 | End: 2019-01-01

## 2019-01-01 RX ORDER — ALBUMIN HUMAN 250 G/1000ML
12.5 SOLUTION INTRAVENOUS EVERY 6 HOURS
Status: COMPLETED | OUTPATIENT
Start: 2019-01-01 | End: 2019-01-01

## 2019-01-01 RX ORDER — LIDOCAINE HYDROCHLORIDE 20 MG/ML
JELLY TOPICAL ONCE
Status: COMPLETED | OUTPATIENT
Start: 2019-01-01 | End: 2019-01-01

## 2019-01-01 RX ORDER — ALBUMIN HUMAN 250 G/1000ML
12.5 SOLUTION INTRAVENOUS 2 TIMES DAILY
Status: COMPLETED | OUTPATIENT
Start: 2019-01-01 | End: 2019-01-01

## 2019-01-01 RX ORDER — SODIUM CHLORIDE 0.9 % (FLUSH) 0.9 %
5-40 SYRINGE (ML) INJECTION AS NEEDED
Status: DISCONTINUED | OUTPATIENT
Start: 2019-01-01 | End: 2019-01-01 | Stop reason: HOSPADM

## 2019-01-01 RX ORDER — ONDANSETRON 2 MG/ML
4-8 INJECTION INTRAMUSCULAR; INTRAVENOUS
Status: DISCONTINUED | OUTPATIENT
Start: 2019-01-01 | End: 2019-01-01 | Stop reason: HOSPADM

## 2019-01-01 RX ORDER — TRAMADOL HYDROCHLORIDE 50 MG/1
50 TABLET ORAL
Status: DISCONTINUED | OUTPATIENT
Start: 2019-01-01 | End: 2019-01-01 | Stop reason: HOSPADM

## 2019-01-01 RX ORDER — ALBUMIN HUMAN 50 G/1000ML
25 SOLUTION INTRAVENOUS ONCE
Status: DISCONTINUED | OUTPATIENT
Start: 2019-01-01 | End: 2019-01-01

## 2019-01-01 RX ORDER — CIPROFLOXACIN 500 MG/1
500 TABLET ORAL EVERY 12 HOURS
Status: DISCONTINUED | OUTPATIENT
Start: 2019-01-01 | End: 2019-01-01

## 2019-01-01 RX ORDER — PANTOPRAZOLE SODIUM 40 MG/1
40 TABLET, DELAYED RELEASE ORAL DAILY
Status: DISCONTINUED | OUTPATIENT
Start: 2019-01-01 | End: 2019-01-01 | Stop reason: SDUPTHER

## 2019-01-01 RX ORDER — BUDESONIDE AND FORMOTEROL FUMARATE DIHYDRATE 160; 4.5 UG/1; UG/1
2 AEROSOL RESPIRATORY (INHALATION) 2 TIMES DAILY
Status: DISCONTINUED | OUTPATIENT
Start: 2019-01-01 | End: 2019-01-01 | Stop reason: CLARIF

## 2019-01-01 RX ORDER — GABAPENTIN 400 MG/1
400 CAPSULE ORAL 3 TIMES DAILY
Status: DISCONTINUED | OUTPATIENT
Start: 2019-01-01 | End: 2019-01-01 | Stop reason: HOSPADM

## 2019-01-01 RX ORDER — SODIUM CHLORIDE 0.9 % (FLUSH) 0.9 %
5-40 SYRINGE (ML) INJECTION AS NEEDED
Status: CANCELLED | OUTPATIENT
Start: 2019-01-01

## 2019-01-01 RX ORDER — SILVER SULFADIAZINE 10 G/1000G
CREAM TOPICAL DAILY
Status: DISCONTINUED | OUTPATIENT
Start: 2019-01-01 | End: 2019-01-01 | Stop reason: HOSPADM

## 2019-01-01 RX ORDER — SPIRONOLACTONE 100 MG/1
200 TABLET, FILM COATED ORAL DAILY
Qty: 30 TAB | Refills: 2 | Status: SHIPPED | OUTPATIENT
Start: 2019-01-01 | End: 2019-01-01

## 2019-01-01 RX ORDER — FUROSEMIDE 40 MG/1
80 TABLET ORAL DAILY
Status: DISCONTINUED | OUTPATIENT
Start: 2019-01-01 | End: 2019-01-01

## 2019-01-01 RX ORDER — NALOXONE HYDROCHLORIDE 0.4 MG/ML
0.2 INJECTION, SOLUTION INTRAMUSCULAR; INTRAVENOUS; SUBCUTANEOUS
Status: DISCONTINUED | OUTPATIENT
Start: 2019-01-01 | End: 2019-01-01 | Stop reason: HOSPADM

## 2019-01-01 RX ORDER — SODIUM CHLORIDE TAB 1 GM 1 G
1 TAB MISCELLANEOUS
Status: DISCONTINUED | OUTPATIENT
Start: 2019-01-01 | End: 2019-01-01

## 2019-01-01 RX ORDER — DOXAZOSIN 2 MG/1
2 TABLET ORAL DAILY
Qty: 30 TAB | Refills: 2 | Status: SHIPPED | OUTPATIENT
Start: 2019-01-01 | End: 2019-01-01

## 2019-01-01 RX ORDER — FUROSEMIDE 10 MG/ML
40 INJECTION INTRAMUSCULAR; INTRAVENOUS
Status: COMPLETED | OUTPATIENT
Start: 2019-01-01 | End: 2019-01-01

## 2019-01-01 RX ORDER — PHYTONADIONE 5 MG/1
10 TABLET ORAL
Status: COMPLETED | OUTPATIENT
Start: 2019-01-01 | End: 2019-01-01

## 2019-01-01 RX ORDER — HYDROCODONE BITARTRATE AND ACETAMINOPHEN 5; 325 MG/1; MG/1
1 TABLET ORAL ONCE
Status: COMPLETED | OUTPATIENT
Start: 2019-01-01 | End: 2019-01-01

## 2019-01-01 RX ADMIN — GABAPENTIN 400 MG: 400 CAPSULE ORAL at 09:02

## 2019-01-01 RX ADMIN — Medication 10 ML: at 21:28

## 2019-01-01 RX ADMIN — POTASSIUM CHLORIDE 40 MEQ: 20 TABLET, EXTENDED RELEASE ORAL at 08:13

## 2019-01-01 RX ADMIN — BUDESONIDE 500 MCG: 0.5 INHALANT RESPIRATORY (INHALATION) at 21:04

## 2019-01-01 RX ADMIN — FERROUS SULFATE TAB 325 MG (65 MG ELEMENTAL FE) 325 MG: 325 (65 FE) TAB at 06:01

## 2019-01-01 RX ADMIN — LACTULOSE 30 G: 20 SOLUTION ORAL at 08:26

## 2019-01-01 RX ADMIN — GABAPENTIN 400 MG: 400 CAPSULE ORAL at 08:30

## 2019-01-01 RX ADMIN — BUDESONIDE 500 MCG: 0.5 INHALANT RESPIRATORY (INHALATION) at 07:45

## 2019-01-01 RX ADMIN — ALBUMIN (HUMAN) 12.5 G: 0.25 INJECTION, SOLUTION INTRAVENOUS at 15:05

## 2019-01-01 RX ADMIN — ALBUMIN (HUMAN) 12.5 G: 0.25 INJECTION, SOLUTION INTRAVENOUS at 14:07

## 2019-01-01 RX ADMIN — LACTULOSE 15 ML: 10 SOLUTION ORAL at 10:22

## 2019-01-01 RX ADMIN — SODIUM CHLORIDE 100 ML/HR: 900 INJECTION, SOLUTION INTRAVENOUS at 22:47

## 2019-01-01 RX ADMIN — Medication 10 ML: at 13:22

## 2019-01-01 RX ADMIN — PANTOPRAZOLE SODIUM 40 MG: 40 TABLET, DELAYED RELEASE ORAL at 06:29

## 2019-01-01 RX ADMIN — BUDESONIDE 500 MCG: 0.5 INHALANT RESPIRATORY (INHALATION) at 01:13

## 2019-01-01 RX ADMIN — GABAPENTIN 400 MG: 400 CAPSULE ORAL at 21:27

## 2019-01-01 RX ADMIN — GABAPENTIN 400 MG: 400 CAPSULE ORAL at 15:49

## 2019-01-01 RX ADMIN — ALBUMIN (HUMAN) 25 G: 0.25 INJECTION, SOLUTION INTRAVENOUS at 11:15

## 2019-01-01 RX ADMIN — GABAPENTIN 400 MG: 400 CAPSULE ORAL at 21:28

## 2019-01-01 RX ADMIN — MINERAL SUPPLEMENT IRON 300 MG / 5 ML STRENGTH LIQUID 100 PER BOX UNFLAVORED 27 MG: at 07:30

## 2019-01-01 RX ADMIN — PANTOPRAZOLE SODIUM 40 MG: 40 TABLET, DELAYED RELEASE ORAL at 06:21

## 2019-01-01 RX ADMIN — SILVER SULFADIAZINE: 10 CREAM TOPICAL at 07:36

## 2019-01-01 RX ADMIN — ALBUMIN (HUMAN) 12.5 G: 0.25 INJECTION, SOLUTION INTRAVENOUS at 11:42

## 2019-01-01 RX ADMIN — RIFAXIMIN 550 MG: 550 TABLET ORAL at 07:34

## 2019-01-01 RX ADMIN — GABAPENTIN 400 MG: 400 CAPSULE ORAL at 22:10

## 2019-01-01 RX ADMIN — HEPARIN SODIUM 5000 UNITS: 5000 INJECTION INTRAVENOUS; SUBCUTANEOUS at 05:14

## 2019-01-01 RX ADMIN — LACTULOSE 15 ML: 10 SOLUTION ORAL at 07:34

## 2019-01-01 RX ADMIN — MIRTAZAPINE 15 MG: 15 TABLET, FILM COATED ORAL at 21:53

## 2019-01-01 RX ADMIN — Medication 10 ML: at 17:04

## 2019-01-01 RX ADMIN — ALBUMIN (HUMAN) 12.5 G: 0.25 INJECTION, SOLUTION INTRAVENOUS at 11:55

## 2019-01-01 RX ADMIN — ALBUMIN (HUMAN) 12.5 G: 0.25 INJECTION, SOLUTION INTRAVENOUS at 15:34

## 2019-01-01 RX ADMIN — CEFEPIME HYDROCHLORIDE 2 G: 2 INJECTION, POWDER, FOR SOLUTION INTRAVENOUS at 21:41

## 2019-01-01 RX ADMIN — HEPARIN SODIUM 5000 UNITS: 5000 INJECTION INTRAVENOUS; SUBCUTANEOUS at 22:49

## 2019-01-01 RX ADMIN — URINARY PAIN RELIEF 95 MG: 95 TABLET ORAL at 08:13

## 2019-01-01 RX ADMIN — Medication 400 MG: at 08:27

## 2019-01-01 RX ADMIN — ALBUMIN (HUMAN) 12.5 G: 0.25 INJECTION, SOLUTION INTRAVENOUS at 11:17

## 2019-01-01 RX ADMIN — FUROSEMIDE 40 MG: 10 INJECTION, SOLUTION INTRAMUSCULAR; INTRAVENOUS at 08:26

## 2019-01-01 RX ADMIN — GABAPENTIN 400 MG: 400 CAPSULE ORAL at 17:00

## 2019-01-01 RX ADMIN — GABAPENTIN 400 MG: 400 CAPSULE ORAL at 22:34

## 2019-01-01 RX ADMIN — ESCITALOPRAM OXALATE 10 MG: 10 TABLET ORAL at 07:20

## 2019-01-01 RX ADMIN — LACTULOSE 15 ML: 10 SOLUTION ORAL at 08:49

## 2019-01-01 RX ADMIN — IOPAMIDOL 100 ML: 755 INJECTION, SOLUTION INTRAVENOUS at 11:56

## 2019-01-01 RX ADMIN — Medication 10 ML: at 05:57

## 2019-01-01 RX ADMIN — LACTULOSE 15 ML: 10 SOLUTION ORAL at 22:27

## 2019-01-01 RX ADMIN — ALBUMIN (HUMAN) 12.5 G: 0.25 INJECTION, SOLUTION INTRAVENOUS at 11:31

## 2019-01-01 RX ADMIN — Medication 400 MG: at 08:26

## 2019-01-01 RX ADMIN — LACTULOSE 15 ML: 10 SOLUTION ORAL at 08:15

## 2019-01-01 RX ADMIN — MIRTAZAPINE 15 MG: 15 TABLET, FILM COATED ORAL at 21:44

## 2019-01-01 RX ADMIN — FERROUS SULFATE TAB 325 MG (65 MG ELEMENTAL FE) 325 MG: 325 (65 FE) TAB at 05:14

## 2019-01-01 RX ADMIN — LACTULOSE 30 G: 20 SOLUTION ORAL at 21:28

## 2019-01-01 RX ADMIN — SODIUM CHLORIDE 200 ML/HR: 900 INJECTION, SOLUTION INTRAVENOUS at 17:00

## 2019-01-01 RX ADMIN — DIATRIZOATE MEGLUMINE AND DIATRIZOATE SODIUM 15 ML: 660; 100 LIQUID ORAL; RECTAL at 17:07

## 2019-01-01 RX ADMIN — MAGNESIUM OXIDE TAB 400 MG (241.3 MG ELEMENTAL MG) 400 MG: 400 (241.3 MG) TAB at 08:49

## 2019-01-01 RX ADMIN — GABAPENTIN 400 MG: 400 CAPSULE ORAL at 16:12

## 2019-01-01 RX ADMIN — Medication 10 ML: at 13:28

## 2019-01-01 RX ADMIN — FERROUS SULFATE TAB 325 MG (65 MG ELEMENTAL FE) 325 MG: 325 (65 FE) TAB at 05:50

## 2019-01-01 RX ADMIN — MIRTAZAPINE 15 MG: 15 TABLET, FILM COATED ORAL at 22:09

## 2019-01-01 RX ADMIN — FUROSEMIDE 40 MG: 10 INJECTION, SOLUTION INTRAMUSCULAR; INTRAVENOUS at 09:08

## 2019-01-01 RX ADMIN — PREDNISONE 40 MG: 20 TABLET ORAL at 16:30

## 2019-01-01 RX ADMIN — BUDESONIDE 500 MCG: 0.5 INHALANT RESPIRATORY (INHALATION) at 09:00

## 2019-01-01 RX ADMIN — GABAPENTIN 400 MG: 400 CAPSULE ORAL at 08:26

## 2019-01-01 RX ADMIN — RIFAXIMIN 550 MG: 550 TABLET ORAL at 07:23

## 2019-01-01 RX ADMIN — GABAPENTIN 400 MG: 400 CAPSULE ORAL at 07:47

## 2019-01-01 RX ADMIN — FUROSEMIDE 80 MG: 10 INJECTION, SOLUTION INTRAMUSCULAR; INTRAVENOUS at 14:21

## 2019-01-01 RX ADMIN — SPIRONOLACTONE 100 MG: 25 TABLET ORAL at 07:34

## 2019-01-01 RX ADMIN — ALBUMIN (HUMAN) 25 G: 0.25 INJECTION, SOLUTION INTRAVENOUS at 10:15

## 2019-01-01 RX ADMIN — ALBUMIN (HUMAN) 12.5 G: 0.25 INJECTION, SOLUTION INTRAVENOUS at 21:44

## 2019-01-01 RX ADMIN — MINERAL SUPPLEMENT IRON 300 MG / 5 ML STRENGTH LIQUID 100 PER BOX UNFLAVORED 27 MG: at 08:13

## 2019-01-01 RX ADMIN — PANTOPRAZOLE SODIUM 40 MG: 40 TABLET, DELAYED RELEASE ORAL at 06:03

## 2019-01-01 RX ADMIN — ALBUMIN (HUMAN) 12.5 G: 0.25 INJECTION, SOLUTION INTRAVENOUS at 10:27

## 2019-01-01 RX ADMIN — Medication 10 ML: at 21:59

## 2019-01-01 RX ADMIN — RIFAXIMIN 550 MG: 550 TABLET ORAL at 17:00

## 2019-01-01 RX ADMIN — TRAMADOL HYDROCHLORIDE 50 MG: 50 TABLET, COATED ORAL at 02:25

## 2019-01-01 RX ADMIN — GABAPENTIN 400 MG: 400 CAPSULE ORAL at 21:53

## 2019-01-01 RX ADMIN — SILVER SULFADIAZINE: 10 CREAM TOPICAL at 07:51

## 2019-01-01 RX ADMIN — ALBUMIN (HUMAN) 12.5 G: 0.25 INJECTION, SOLUTION INTRAVENOUS at 08:27

## 2019-01-01 RX ADMIN — GABAPENTIN 400 MG: 400 CAPSULE ORAL at 15:35

## 2019-01-01 RX ADMIN — Medication 10 ML: at 06:00

## 2019-01-01 RX ADMIN — ALBUMIN (HUMAN) 12.5 G: 0.25 INJECTION, SOLUTION INTRAVENOUS at 09:01

## 2019-01-01 RX ADMIN — HEPARIN SODIUM 5000 UNITS: 5000 INJECTION INTRAVENOUS; SUBCUTANEOUS at 05:15

## 2019-01-01 RX ADMIN — PANTOPRAZOLE SODIUM 40 MG: 40 TABLET, DELAYED RELEASE ORAL at 06:23

## 2019-01-01 RX ADMIN — FUROSEMIDE 40 MG: 10 INJECTION, SOLUTION INTRAMUSCULAR; INTRAVENOUS at 09:02

## 2019-01-01 RX ADMIN — GABAPENTIN 400 MG: 400 CAPSULE ORAL at 08:49

## 2019-01-01 RX ADMIN — Medication 10 ML: at 21:46

## 2019-01-01 RX ADMIN — GABAPENTIN 400 MG: 400 CAPSULE ORAL at 10:12

## 2019-01-01 RX ADMIN — SODIUM CHLORIDE 1000 ML: 900 INJECTION, SOLUTION INTRAVENOUS at 14:41

## 2019-01-01 RX ADMIN — Medication 10 ML: at 13:24

## 2019-01-01 RX ADMIN — PANTOPRAZOLE SODIUM 40 MG: 40 TABLET, DELAYED RELEASE ORAL at 08:54

## 2019-01-01 RX ADMIN — FENTANYL CITRATE 50 MCG: 50 INJECTION, SOLUTION INTRAMUSCULAR; INTRAVENOUS at 14:44

## 2019-01-01 RX ADMIN — DEXAMETHASONE SODIUM PHOSPHATE 10 MG: 10 INJECTION INTRAMUSCULAR; INTRAVENOUS at 19:30

## 2019-01-01 RX ADMIN — GABAPENTIN 400 MG: 400 CAPSULE ORAL at 16:19

## 2019-01-01 RX ADMIN — PANTOPRAZOLE SODIUM 40 MG: 40 TABLET, DELAYED RELEASE ORAL at 08:30

## 2019-01-01 RX ADMIN — LACTULOSE 15 ML: 10 SOLUTION ORAL at 21:58

## 2019-01-01 RX ADMIN — ALBUTEROL SULFATE 2.5 MG: 2.5 SOLUTION RESPIRATORY (INHALATION) at 19:50

## 2019-01-01 RX ADMIN — HEPARIN SODIUM 5000 UNITS: 5000 INJECTION INTRAVENOUS; SUBCUTANEOUS at 16:54

## 2019-01-01 RX ADMIN — FUROSEMIDE 80 MG: 40 TABLET ORAL at 08:13

## 2019-01-01 RX ADMIN — SODIUM CHLORIDE TAB 1 GM 1 G: 1 TAB at 08:00

## 2019-01-01 RX ADMIN — MIDAZOLAM HYDROCHLORIDE 2 MG: 1 INJECTION, SOLUTION INTRAMUSCULAR; INTRAVENOUS at 14:40

## 2019-01-01 RX ADMIN — FERROUS SULFATE TAB 325 MG (65 MG ELEMENTAL FE) 325 MG: 325 (65 FE) TAB at 08:26

## 2019-01-01 RX ADMIN — GABAPENTIN 400 MG: 400 CAPSULE ORAL at 21:44

## 2019-01-01 RX ADMIN — ESCITALOPRAM OXALATE 10 MG: 10 TABLET ORAL at 09:30

## 2019-01-01 RX ADMIN — FUROSEMIDE 40 MG: 10 INJECTION, SOLUTION INTRAMUSCULAR; INTRAVENOUS at 07:20

## 2019-01-01 RX ADMIN — CEFTRIAXONE 2 G: 2 INJECTION, POWDER, FOR SOLUTION INTRAMUSCULAR; INTRAVENOUS at 08:00

## 2019-01-01 RX ADMIN — ALBUTEROL SULFATE 2.5 MG: 2.5 SOLUTION RESPIRATORY (INHALATION) at 07:30

## 2019-01-01 RX ADMIN — ESCITALOPRAM OXALATE 10 MG: 10 TABLET ORAL at 08:49

## 2019-01-01 RX ADMIN — ESCITALOPRAM OXALATE 10 MG: 10 TABLET ORAL at 08:15

## 2019-01-01 RX ADMIN — RIFAXIMIN 550 MG: 550 TABLET ORAL at 09:30

## 2019-01-01 RX ADMIN — ALBUMIN (HUMAN) 12.5 G: 0.25 INJECTION, SOLUTION INTRAVENOUS at 16:06

## 2019-01-01 RX ADMIN — ALBUMIN (HUMAN) 12.5 G: 0.25 INJECTION, SOLUTION INTRAVENOUS at 14:46

## 2019-01-01 RX ADMIN — ALBUMIN (HUMAN) 12.5 G: 0.25 INJECTION, SOLUTION INTRAVENOUS at 02:25

## 2019-01-01 RX ADMIN — PHYTONADIONE 10 MG: 5 TABLET ORAL at 11:18

## 2019-01-01 RX ADMIN — ALBUMIN (HUMAN) 12.5 G: 0.25 INJECTION, SOLUTION INTRAVENOUS at 08:49

## 2019-01-01 RX ADMIN — ALBUMIN (HUMAN) 12.5 G: 0.25 INJECTION, SOLUTION INTRAVENOUS at 13:34

## 2019-01-01 RX ADMIN — TRAMADOL HYDROCHLORIDE 50 MG: 50 TABLET, COATED ORAL at 21:07

## 2019-01-01 RX ADMIN — GABAPENTIN 400 MG: 400 CAPSULE ORAL at 17:22

## 2019-01-01 RX ADMIN — ALBUMIN (HUMAN) 12.5 G: 0.25 INJECTION, SOLUTION INTRAVENOUS at 08:15

## 2019-01-01 RX ADMIN — RACEPINEPHRINE HYDROCHLORIDE 0.5 ML: 11.25 SOLUTION RESPIRATORY (INHALATION) at 16:22

## 2019-01-01 RX ADMIN — LIDOCAINE HYDROCHLORIDE: 40 SOLUTION TOPICAL at 14:41

## 2019-01-01 RX ADMIN — ALBUMIN (HUMAN) 12.5 G: 0.25 INJECTION, SOLUTION INTRAVENOUS at 08:00

## 2019-01-01 RX ADMIN — HEPARIN SODIUM 5000 UNITS: 5000 INJECTION INTRAVENOUS; SUBCUTANEOUS at 05:48

## 2019-01-01 RX ADMIN — LACTULOSE 15 ML: 10 SOLUTION ORAL at 16:12

## 2019-01-01 RX ADMIN — GABAPENTIN 400 MG: 400 CAPSULE ORAL at 09:08

## 2019-01-01 RX ADMIN — ALBUTEROL SULFATE 2.5 MG: 2.5 SOLUTION RESPIRATORY (INHALATION) at 14:28

## 2019-01-01 RX ADMIN — HEPARIN SODIUM 5000 UNITS: 5000 INJECTION INTRAVENOUS; SUBCUTANEOUS at 18:17

## 2019-01-01 RX ADMIN — Medication 400 MG: at 08:14

## 2019-01-01 RX ADMIN — Medication 10 ML: at 14:00

## 2019-01-01 RX ADMIN — ALBUMIN (HUMAN) 12.5 G: 0.25 INJECTION, SOLUTION INTRAVENOUS at 21:09

## 2019-01-01 RX ADMIN — ESCITALOPRAM OXALATE 10 MG: 10 TABLET ORAL at 07:34

## 2019-01-01 RX ADMIN — CIPROFLOXACIN HYDROCHLORIDE 500 MG: 500 TABLET, FILM COATED ORAL at 22:09

## 2019-01-01 RX ADMIN — MIRTAZAPINE 15 MG: 15 TABLET, FILM COATED ORAL at 22:27

## 2019-01-01 RX ADMIN — RIFAXIMIN 550 MG: 550 TABLET ORAL at 17:13

## 2019-01-01 RX ADMIN — LACTULOSE 15 ML: 10 SOLUTION ORAL at 08:29

## 2019-01-01 RX ADMIN — ALBUMIN (HUMAN) 50 G: 0.25 INJECTION, SOLUTION INTRAVENOUS at 11:10

## 2019-01-01 RX ADMIN — LACTULOSE 30 ML: 20 SOLUTION ORAL at 19:20

## 2019-01-01 RX ADMIN — ALBUMIN (HUMAN) 12.5 G: 0.25 INJECTION, SOLUTION INTRAVENOUS at 21:42

## 2019-01-01 RX ADMIN — SODIUM POLYSTYRENE SULFONATE 15 G: 15 SUSPENSION ORAL; RECTAL at 15:35

## 2019-01-01 RX ADMIN — SILVER SULFADIAZINE: 10 CREAM TOPICAL at 12:06

## 2019-01-01 RX ADMIN — ESCITALOPRAM OXALATE 10 MG: 10 TABLET ORAL at 10:21

## 2019-01-01 RX ADMIN — CIPROFLOXACIN HYDROCHLORIDE 500 MG: 500 TABLET, FILM COATED ORAL at 21:58

## 2019-01-01 RX ADMIN — GABAPENTIN 400 MG: 400 CAPSULE ORAL at 10:36

## 2019-01-01 RX ADMIN — MAGNESIUM OXIDE TAB 400 MG (241.3 MG ELEMENTAL MG) 400 MG: 400 (241.3 MG) TAB at 08:54

## 2019-01-01 RX ADMIN — CIPROFLOXACIN HYDROCHLORIDE 500 MG: 500 TABLET, FILM COATED ORAL at 21:44

## 2019-01-01 RX ADMIN — TRAMADOL HYDROCHLORIDE 50 MG: 50 TABLET, COATED ORAL at 21:27

## 2019-01-01 RX ADMIN — CIPROFLOXACIN HYDROCHLORIDE 500 MG: 500 TABLET, FILM COATED ORAL at 22:28

## 2019-01-01 RX ADMIN — HEPARIN SODIUM 5000 UNITS: 5000 INJECTION INTRAVENOUS; SUBCUTANEOUS at 06:01

## 2019-01-01 RX ADMIN — RIFAXIMIN 550 MG: 550 TABLET ORAL at 17:22

## 2019-01-01 RX ADMIN — ALBUMIN (HUMAN) 12.5 G: 0.25 INJECTION, SOLUTION INTRAVENOUS at 02:08

## 2019-01-01 RX ADMIN — MAGNESIUM OXIDE TAB 400 MG (241.3 MG ELEMENTAL MG) 400 MG: 400 (241.3 MG) TAB at 07:47

## 2019-01-01 RX ADMIN — CIPROFLOXACIN HYDROCHLORIDE 500 MG: 500 TABLET, FILM COATED ORAL at 21:53

## 2019-01-01 RX ADMIN — Medication 400 MG: at 09:30

## 2019-01-01 RX ADMIN — ALBUMIN (HUMAN) 50 G: 0.25 INJECTION, SOLUTION INTRAVENOUS at 08:30

## 2019-01-01 RX ADMIN — ALBUMIN (HUMAN) 12.5 G: 0.25 INJECTION, SOLUTION INTRAVENOUS at 20:00

## 2019-01-01 RX ADMIN — ALBUMIN (HUMAN) 93 G: 0.25 INJECTION, SOLUTION INTRAVENOUS at 15:37

## 2019-01-01 RX ADMIN — SODIUM CHLORIDE TAB 1 GM 1 G: 1 TAB at 10:12

## 2019-01-01 RX ADMIN — Medication 400 MG: at 10:12

## 2019-01-01 RX ADMIN — ALBUMIN (HUMAN) 12.5 G: 0.25 INJECTION, SOLUTION INTRAVENOUS at 10:45

## 2019-01-01 RX ADMIN — ESCITALOPRAM OXALATE 10 MG: 10 TABLET ORAL at 09:02

## 2019-01-01 RX ADMIN — ALUMINUM HYDROXIDE, MAGNESIUM HYDROXIDE, AND SIMETHICONE 30 ML: 200; 200; 20 SUSPENSION ORAL at 11:43

## 2019-01-01 RX ADMIN — FUROSEMIDE 40 MG: 10 INJECTION, SOLUTION INTRAMUSCULAR; INTRAVENOUS at 08:15

## 2019-01-01 RX ADMIN — SPIRONOLACTONE 100 MG: 25 TABLET ORAL at 08:14

## 2019-01-01 RX ADMIN — BUDESONIDE 500 MCG: 0.5 INHALANT RESPIRATORY (INHALATION) at 19:50

## 2019-01-01 RX ADMIN — ALBUTEROL SULFATE 2.5 MG: 2.5 SOLUTION RESPIRATORY (INHALATION) at 09:00

## 2019-01-01 RX ADMIN — SILVER SULFADIAZINE: 10 CREAM TOPICAL at 07:21

## 2019-01-01 RX ADMIN — CEFTRIAXONE 2 G: 2 INJECTION, POWDER, FOR SOLUTION INTRAMUSCULAR; INTRAVENOUS at 09:09

## 2019-01-01 RX ADMIN — MAGNESIUM OXIDE TAB 400 MG (241.3 MG ELEMENTAL MG) 400 MG: 400 (241.3 MG) TAB at 09:02

## 2019-01-01 RX ADMIN — ALBUMIN (HUMAN) 12.5 G: 0.25 INJECTION, SOLUTION INTRAVENOUS at 16:43

## 2019-01-01 RX ADMIN — MINERAL SUPPLEMENT IRON 300 MG / 5 ML STRENGTH LIQUID 100 PER BOX UNFLAVORED 27 MG: at 06:23

## 2019-01-01 RX ADMIN — ESCITALOPRAM OXALATE 10 MG: 10 TABLET ORAL at 08:30

## 2019-01-01 RX ADMIN — ESCITALOPRAM OXALATE 10 MG: 10 TABLET ORAL at 07:47

## 2019-01-01 RX ADMIN — LACTULOSE 15 ML: 10 SOLUTION ORAL at 09:02

## 2019-01-01 RX ADMIN — ALBUTEROL SULFATE 2.5 MG: 2.5 SOLUTION RESPIRATORY (INHALATION) at 07:45

## 2019-01-01 RX ADMIN — SPIRONOLACTONE 100 MG: 25 TABLET ORAL at 07:20

## 2019-01-01 RX ADMIN — ALBUMIN (HUMAN) 50 G: 0.25 INJECTION, SOLUTION INTRAVENOUS at 16:39

## 2019-01-01 RX ADMIN — HEPARIN SODIUM 5000 UNITS: 5000 INJECTION INTRAVENOUS; SUBCUTANEOUS at 18:42

## 2019-01-01 RX ADMIN — LACTULOSE 15 ML: 10 SOLUTION ORAL at 07:47

## 2019-01-01 RX ADMIN — ALBUMIN (HUMAN) 12.5 G: 0.25 INJECTION, SOLUTION INTRAVENOUS at 21:28

## 2019-01-01 RX ADMIN — HEPARIN SODIUM 5000 UNITS: 5000 INJECTION INTRAVENOUS; SUBCUTANEOUS at 06:26

## 2019-01-01 RX ADMIN — GABAPENTIN 400 MG: 400 CAPSULE ORAL at 08:13

## 2019-01-01 RX ADMIN — FUROSEMIDE 40 MG: 10 INJECTION, SOLUTION INTRAMUSCULAR; INTRAVENOUS at 10:12

## 2019-01-01 RX ADMIN — ACETAMINOPHEN 650 MG: 325 TABLET, FILM COATED ORAL at 12:06

## 2019-01-01 RX ADMIN — HEPARIN SODIUM 5000 UNITS: 5000 INJECTION INTRAVENOUS; SUBCUTANEOUS at 19:59

## 2019-01-01 RX ADMIN — GABAPENTIN 400 MG: 400 CAPSULE ORAL at 21:07

## 2019-01-01 RX ADMIN — Medication 400 MG: at 09:08

## 2019-01-01 RX ADMIN — RIFAXIMIN 550 MG: 550 TABLET ORAL at 08:27

## 2019-01-01 RX ADMIN — MINERAL SUPPLEMENT IRON 300 MG / 5 ML STRENGTH LIQUID 100 PER BOX UNFLAVORED 27 MG: at 08:29

## 2019-01-01 RX ADMIN — HEPARIN SODIUM 5000 UNITS: 5000 INJECTION INTRAVENOUS; SUBCUTANEOUS at 18:03

## 2019-01-01 RX ADMIN — ALBUMIN (HUMAN) 50 G: 0.25 INJECTION, SOLUTION INTRAVENOUS at 22:47

## 2019-01-01 RX ADMIN — HEPARIN SODIUM 5000 UNITS: 5000 INJECTION INTRAVENOUS; SUBCUTANEOUS at 05:32

## 2019-01-01 RX ADMIN — ALBUMIN (HUMAN) 12.5 G: 0.25 INJECTION, SOLUTION INTRAVENOUS at 16:34

## 2019-01-01 RX ADMIN — HYDROCODONE BITARTRATE AND ACETAMINOPHEN 1 TABLET: 5; 325 TABLET ORAL at 21:41

## 2019-01-01 RX ADMIN — DOXAZOSIN 2 MG: 4 TABLET ORAL at 08:24

## 2019-01-01 RX ADMIN — GABAPENTIN 400 MG: 400 CAPSULE ORAL at 08:15

## 2019-01-01 RX ADMIN — GABAPENTIN 400 MG: 400 CAPSULE ORAL at 16:51

## 2019-01-01 RX ADMIN — FUROSEMIDE 40 MG: 10 INJECTION, SOLUTION INTRAMUSCULAR; INTRAVENOUS at 10:22

## 2019-01-01 RX ADMIN — HEPARIN SODIUM 5000 UNITS: 5000 INJECTION INTRAVENOUS; SUBCUTANEOUS at 05:56

## 2019-01-01 RX ADMIN — CIPROFLOXACIN HYDROCHLORIDE 500 MG: 500 TABLET, FILM COATED ORAL at 21:35

## 2019-01-01 RX ADMIN — SODIUM CHLORIDE 100 ML: 900 INJECTION, SOLUTION INTRAVENOUS at 11:56

## 2019-01-01 RX ADMIN — LACTULOSE 15 ML: 10 SOLUTION ORAL at 16:51

## 2019-01-01 RX ADMIN — ALBUTEROL SULFATE 2.5 MG: 2.5 SOLUTION RESPIRATORY (INHALATION) at 14:46

## 2019-01-01 RX ADMIN — ALBUMIN (HUMAN) 12.5 G: 0.25 INJECTION, SOLUTION INTRAVENOUS at 12:17

## 2019-01-01 RX ADMIN — SODIUM CHLORIDE TAB 1 GM 1 G: 1 TAB at 12:00

## 2019-01-01 RX ADMIN — FUROSEMIDE 40 MG: 10 INJECTION, SOLUTION INTRAMUSCULAR; INTRAVENOUS at 18:17

## 2019-01-01 RX ADMIN — HEPARIN SODIUM 5000 UNITS: 5000 INJECTION INTRAVENOUS; SUBCUTANEOUS at 19:37

## 2019-01-01 RX ADMIN — ACETAMINOPHEN 650 MG: 325 TABLET, FILM COATED ORAL at 10:58

## 2019-01-01 RX ADMIN — MAGNESIUM OXIDE TAB 400 MG (241.3 MG ELEMENTAL MG) 400 MG: 400 (241.3 MG) TAB at 10:21

## 2019-01-01 RX ADMIN — ALBUMIN (HUMAN) 12.5 G: 0.25 INJECTION, SOLUTION INTRAVENOUS at 11:02

## 2019-01-01 RX ADMIN — ALBUMIN (HUMAN) 12.5 G: 0.25 INJECTION, SOLUTION INTRAVENOUS at 12:06

## 2019-01-01 RX ADMIN — SPIRONOLACTONE 100 MG: 25 TABLET ORAL at 08:54

## 2019-01-01 RX ADMIN — Medication 10 ML: at 05:15

## 2019-01-01 RX ADMIN — LACTULOSE 15 ML: 10 SOLUTION ORAL at 15:49

## 2019-01-01 RX ADMIN — LACTULOSE 30 G: 20 SOLUTION ORAL at 17:01

## 2019-01-01 RX ADMIN — MINERAL SUPPLEMENT IRON 300 MG / 5 ML STRENGTH LIQUID 100 PER BOX UNFLAVORED 27 MG: at 07:22

## 2019-01-01 RX ADMIN — GABAPENTIN 400 MG: 400 CAPSULE ORAL at 21:45

## 2019-01-01 RX ADMIN — ALBUMIN (HUMAN) 12.5 G: 0.25 INJECTION, SOLUTION INTRAVENOUS at 09:00

## 2019-01-01 RX ADMIN — RIFAXIMIN 550 MG: 550 TABLET ORAL at 07:46

## 2019-01-01 RX ADMIN — MAGNESIUM OXIDE TAB 400 MG (241.3 MG ELEMENTAL MG) 400 MG: 400 (241.3 MG) TAB at 07:20

## 2019-01-01 RX ADMIN — SODIUM CHLORIDE 250 ML: 900 INJECTION, SOLUTION INTRAVENOUS at 11:12

## 2019-01-01 RX ADMIN — Medication 10 ML: at 14:44

## 2019-01-01 RX ADMIN — RIFAXIMIN 550 MG: 550 TABLET ORAL at 21:38

## 2019-01-01 RX ADMIN — CIPROFLOXACIN HYDROCHLORIDE 500 MG: 500 TABLET, FILM COATED ORAL at 22:34

## 2019-01-01 RX ADMIN — SPIRONOLACTONE 100 MG: 25 TABLET ORAL at 10:21

## 2019-01-01 RX ADMIN — ALBUTEROL SULFATE 2.5 MG: 2.5 SOLUTION RESPIRATORY (INHALATION) at 15:00

## 2019-01-01 RX ADMIN — LACTULOSE 30 G: 20 SOLUTION ORAL at 21:45

## 2019-01-01 RX ADMIN — TRAMADOL HYDROCHLORIDE 50 MG: 50 TABLET, COATED ORAL at 21:30

## 2019-01-01 RX ADMIN — RIFAXIMIN 550 MG: 550 TABLET ORAL at 16:51

## 2019-01-01 RX ADMIN — BUDESONIDE 500 MCG: 0.5 INHALANT RESPIRATORY (INHALATION) at 19:22

## 2019-01-01 RX ADMIN — GABAPENTIN 400 MG: 400 CAPSULE ORAL at 21:58

## 2019-01-01 RX ADMIN — CEFEPIME HYDROCHLORIDE 2 G: 2 INJECTION, POWDER, FOR SOLUTION INTRAVENOUS at 22:33

## 2019-01-01 RX ADMIN — RIFAXIMIN 550 MG: 550 TABLET ORAL at 18:18

## 2019-01-01 RX ADMIN — MINERAL SUPPLEMENT IRON 300 MG / 5 ML STRENGTH LIQUID 100 PER BOX UNFLAVORED 27 MG: at 06:31

## 2019-01-01 RX ADMIN — Medication 10 ML: at 05:14

## 2019-01-01 RX ADMIN — LIDOCAINE HYDROCHLORIDE 5 ML: 10 INJECTION, SOLUTION INFILTRATION; PERINEURAL at 08:20

## 2019-01-01 RX ADMIN — FUROSEMIDE 40 MG: 10 INJECTION, SOLUTION INTRAMUSCULAR; INTRAVENOUS at 17:01

## 2019-01-01 RX ADMIN — GABAPENTIN 400 MG: 400 CAPSULE ORAL at 07:34

## 2019-01-01 RX ADMIN — TRAMADOL HYDROCHLORIDE 50 MG: 50 TABLET, COATED ORAL at 14:32

## 2019-01-01 RX ADMIN — LACTULOSE 15 ML: 10 SOLUTION ORAL at 16:30

## 2019-01-01 RX ADMIN — TRAMADOL HYDROCHLORIDE 50 MG: 50 TABLET, COATED ORAL at 06:01

## 2019-01-01 RX ADMIN — LACTULOSE 30 G: 20 SOLUTION ORAL at 18:43

## 2019-01-01 RX ADMIN — MINERAL SUPPLEMENT IRON 300 MG / 5 ML STRENGTH LIQUID 100 PER BOX UNFLAVORED 27 MG: at 06:46

## 2019-01-01 RX ADMIN — MINERAL SUPPLEMENT IRON 300 MG / 5 ML STRENGTH LIQUID 100 PER BOX UNFLAVORED 27 MG: at 08:52

## 2019-01-01 RX ADMIN — Medication 10 ML: at 22:00

## 2019-01-01 RX ADMIN — GABAPENTIN 400 MG: 400 CAPSULE ORAL at 17:13

## 2019-01-01 RX ADMIN — ONDANSETRON 4 MG: 2 INJECTION INTRAMUSCULAR; INTRAVENOUS at 21:46

## 2019-01-01 RX ADMIN — FUROSEMIDE 40 MG: 10 INJECTION, SOLUTION INTRAMUSCULAR; INTRAVENOUS at 07:48

## 2019-01-01 RX ADMIN — PANTOPRAZOLE SODIUM 40 MG: 40 TABLET, DELAYED RELEASE ORAL at 05:56

## 2019-01-01 RX ADMIN — PANTOPRAZOLE SODIUM 40 MG: 40 TABLET, DELAYED RELEASE ORAL at 07:22

## 2019-01-01 RX ADMIN — FUROSEMIDE 40 MG: 10 INJECTION, SOLUTION INTRAMUSCULAR; INTRAVENOUS at 17:47

## 2019-01-01 RX ADMIN — ALBUTEROL SULFATE 2.5 MG: 2.5 SOLUTION RESPIRATORY (INHALATION) at 07:40

## 2019-01-01 RX ADMIN — GABAPENTIN 400 MG: 400 CAPSULE ORAL at 23:38

## 2019-01-01 RX ADMIN — ALBUTEROL SULFATE 2.5 MG: 2.5 SOLUTION RESPIRATORY (INHALATION) at 19:22

## 2019-01-01 RX ADMIN — Medication 5 ML: at 12:03

## 2019-01-01 RX ADMIN — MIRTAZAPINE 15 MG: 15 TABLET, FILM COATED ORAL at 22:34

## 2019-01-01 RX ADMIN — SODIUM CHLORIDE TAB 1 GM 1 G: 1 TAB at 17:00

## 2019-01-01 RX ADMIN — MAGNESIUM OXIDE TAB 400 MG (241.3 MG ELEMENTAL MG) 400 MG: 400 (241.3 MG) TAB at 08:15

## 2019-01-01 RX ADMIN — BUDESONIDE 500 MCG: 0.5 INHALANT RESPIRATORY (INHALATION) at 07:40

## 2019-01-01 RX ADMIN — FUROSEMIDE 40 MG: 10 INJECTION, SOLUTION INTRAMUSCULAR; INTRAVENOUS at 07:36

## 2019-01-01 RX ADMIN — LACTULOSE 15 ML: 10 SOLUTION ORAL at 17:00

## 2019-01-01 RX ADMIN — GABAPENTIN 400 MG: 400 CAPSULE ORAL at 08:54

## 2019-01-01 RX ADMIN — BUDESONIDE 500 MCG: 0.5 INHALANT RESPIRATORY (INHALATION) at 07:30

## 2019-01-01 RX ADMIN — MAGNESIUM OXIDE TAB 400 MG (241.3 MG ELEMENTAL MG) 400 MG: 400 (241.3 MG) TAB at 07:34

## 2019-01-01 RX ADMIN — HEPARIN SODIUM 5000 UNITS: 5000 INJECTION INTRAVENOUS; SUBCUTANEOUS at 17:00

## 2019-01-01 RX ADMIN — Medication 10 ML: at 06:04

## 2019-01-01 RX ADMIN — LACTULOSE 30 G: 20 SOLUTION ORAL at 10:12

## 2019-01-01 RX ADMIN — MAGNESIUM OXIDE TAB 400 MG (241.3 MG ELEMENTAL MG) 400 MG: 400 (241.3 MG) TAB at 08:30

## 2019-01-01 RX ADMIN — SPIRONOLACTONE 200 MG: 100 TABLET, FILM COATED ORAL at 08:13

## 2019-01-01 RX ADMIN — MIRTAZAPINE 15 MG: 15 TABLET, FILM COATED ORAL at 21:35

## 2019-01-01 RX ADMIN — PANTOPRAZOLE SODIUM 40 MG: 40 TABLET, DELAYED RELEASE ORAL at 06:31

## 2019-01-01 RX ADMIN — ALBUMIN (HUMAN) 12.5 G: 0.25 INJECTION, SOLUTION INTRAVENOUS at 10:15

## 2019-01-01 RX ADMIN — Medication 10 ML: at 06:26

## 2019-01-01 RX ADMIN — Medication 400 MG: at 10:36

## 2019-01-01 RX ADMIN — FUROSEMIDE 40 MG: 10 INJECTION, SOLUTION INTRAMUSCULAR; INTRAVENOUS at 19:20

## 2019-01-01 RX ADMIN — Medication 10 ML: at 21:45

## 2019-01-01 RX ADMIN — Medication 10 ML: at 17:14

## 2019-01-01 RX ADMIN — ZOLPIDEM TARTRATE 5 MG: 5 TABLET ORAL at 23:11

## 2019-01-01 RX ADMIN — LIDOCAINE HYDROCHLORIDE 10 ML: 20 SOLUTION ORAL; TOPICAL at 11:43

## 2019-01-01 RX ADMIN — GABAPENTIN 400 MG: 400 CAPSULE ORAL at 17:52

## 2019-01-01 RX ADMIN — LACTULOSE 15 ML: 10 SOLUTION ORAL at 07:21

## 2019-01-01 RX ADMIN — LACTULOSE 30 G: 20 SOLUTION ORAL at 10:36

## 2019-01-01 RX ADMIN — ONDANSETRON 4 MG: 2 INJECTION INTRAMUSCULAR; INTRAVENOUS at 17:06

## 2019-01-01 RX ADMIN — SODIUM CHLORIDE 500 ML: 900 INJECTION, SOLUTION INTRAVENOUS at 14:13

## 2019-01-01 RX ADMIN — LACTULOSE 15 ML: 10 SOLUTION ORAL at 08:53

## 2019-01-01 RX ADMIN — ALBUMIN (HUMAN) 12.5 G: 0.25 INJECTION, SOLUTION INTRAVENOUS at 02:03

## 2019-01-01 RX ADMIN — TUBERCULIN PURIFIED PROTEIN DERIVATIVE 5 UNITS: 5 INJECTION, SOLUTION INTRADERMAL at 14:18

## 2019-01-01 RX ADMIN — ALBUMIN (HUMAN) 12.5 G: 0.25 INJECTION, SOLUTION INTRAVENOUS at 10:40

## 2019-01-01 RX ADMIN — LACTULOSE 15 ML: 10 SOLUTION ORAL at 21:35

## 2019-01-01 RX ADMIN — ACETAMINOPHEN 650 MG: 325 TABLET, FILM COATED ORAL at 09:31

## 2019-01-01 RX ADMIN — Medication 10 ML: at 11:53

## 2019-01-01 RX ADMIN — GABAPENTIN 400 MG: 400 CAPSULE ORAL at 16:00

## 2019-01-01 RX ADMIN — Medication 5 ML: at 21:53

## 2019-01-01 RX ADMIN — PANTOPRAZOLE SODIUM 40 MG: 40 TABLET, DELAYED RELEASE ORAL at 06:46

## 2019-01-01 RX ADMIN — SPIRONOLACTONE 100 MG: 25 TABLET ORAL at 08:30

## 2019-01-01 RX ADMIN — GABAPENTIN 400 MG: 400 CAPSULE ORAL at 22:50

## 2019-01-01 RX ADMIN — Medication 10 ML: at 22:51

## 2019-01-01 RX ADMIN — CEFTRIAXONE 2 G: 2 INJECTION, POWDER, FOR SOLUTION INTRAMUSCULAR; INTRAVENOUS at 09:08

## 2019-01-01 RX ADMIN — FUROSEMIDE 60 MG: 10 INJECTION, SOLUTION INTRAMUSCULAR; INTRAVENOUS at 11:39

## 2019-01-01 RX ADMIN — ALBUTEROL SULFATE 2.5 MG: 2.5 SOLUTION RESPIRATORY (INHALATION) at 21:04

## 2019-01-01 RX ADMIN — LACTULOSE 15 ML: 10 SOLUTION ORAL at 21:53

## 2019-01-01 RX ADMIN — GABAPENTIN 400 MG: 400 CAPSULE ORAL at 18:42

## 2019-01-01 RX ADMIN — GABAPENTIN 400 MG: 400 CAPSULE ORAL at 22:27

## 2019-01-01 RX ADMIN — GABAPENTIN 400 MG: 400 CAPSULE ORAL at 10:21

## 2019-01-01 RX ADMIN — MIRTAZAPINE 15 MG: 15 TABLET, FILM COATED ORAL at 21:58

## 2019-01-01 RX ADMIN — Medication 10 ML: at 22:28

## 2019-01-01 RX ADMIN — ALBUMIN (HUMAN) 12.5 G: 0.25 INJECTION, SOLUTION INTRAVENOUS at 10:38

## 2019-01-01 RX ADMIN — ALBUMIN (HUMAN) 12.5 G: 0.25 INJECTION, SOLUTION INTRAVENOUS at 02:30

## 2019-01-01 RX ADMIN — CEFTRIAXONE 2 G: 2 INJECTION, POWDER, FOR SOLUTION INTRAMUSCULAR; INTRAVENOUS at 10:12

## 2019-01-01 RX ADMIN — FUROSEMIDE 40 MG: 10 INJECTION, SOLUTION INTRAMUSCULAR; INTRAVENOUS at 18:49

## 2019-01-01 RX ADMIN — MINERAL SUPPLEMENT IRON 300 MG / 5 ML STRENGTH LIQUID 100 PER BOX UNFLAVORED 27 MG: at 06:21

## 2019-01-01 RX ADMIN — GABAPENTIN 400 MG: 400 CAPSULE ORAL at 09:30

## 2019-01-01 RX ADMIN — FENTANYL CITRATE 50 MCG: 50 INJECTION, SOLUTION INTRAMUSCULAR; INTRAVENOUS at 14:40

## 2019-01-01 RX ADMIN — FUROSEMIDE 40 MG: 10 INJECTION, SOLUTION INTRAMUSCULAR; INTRAVENOUS at 08:30

## 2019-01-01 RX ADMIN — FUROSEMIDE 40 MG: 10 INJECTION, SOLUTION INTRAMUSCULAR; INTRAVENOUS at 08:54

## 2019-01-01 RX ADMIN — ESCITALOPRAM OXALATE 10 MG: 10 TABLET ORAL at 11:16

## 2019-01-01 RX ADMIN — GABAPENTIN 400 MG: 400 CAPSULE ORAL at 07:20

## 2019-01-01 RX ADMIN — Medication 5 ML: at 13:53

## 2019-01-01 RX ADMIN — SPIRONOLACTONE 100 MG: 25 TABLET ORAL at 07:46

## 2019-01-01 RX ADMIN — FUROSEMIDE 40 MG: 10 INJECTION, SOLUTION INTRAMUSCULAR; INTRAVENOUS at 10:36

## 2019-01-01 RX ADMIN — FUROSEMIDE 40 MG: 10 INJECTION, SOLUTION INTRAMUSCULAR; INTRAVENOUS at 08:49

## 2019-01-01 RX ADMIN — LACTULOSE 30 G: 20 SOLUTION ORAL at 21:43

## 2019-01-01 RX ADMIN — LIDOCAINE HYDROCHLORIDE: 20 JELLY TOPICAL at 14:40

## 2019-01-01 RX ADMIN — ALBUMIN (HUMAN) 12.5 G: 0.25 INJECTION, SOLUTION INTRAVENOUS at 01:37

## 2019-01-01 RX ADMIN — ALBUMIN (HUMAN) 12.5 G: 0.25 INJECTION, SOLUTION INTRAVENOUS at 13:26

## 2019-01-01 RX ADMIN — Medication 5 ML: at 14:33

## 2019-01-01 RX ADMIN — ESCITALOPRAM OXALATE 10 MG: 10 TABLET ORAL at 08:54

## 2019-01-01 RX ADMIN — ALBUMIN (HUMAN) 12.5 G: 0.25 INJECTION, SOLUTION INTRAVENOUS at 10:25

## 2019-01-01 RX ADMIN — GABAPENTIN 400 MG: 400 CAPSULE ORAL at 17:01

## 2019-01-01 RX ADMIN — ESCITALOPRAM OXALATE 10 MG: 10 TABLET ORAL at 08:26

## 2019-01-01 RX ADMIN — LACTULOSE 15 ML: 10 SOLUTION ORAL at 17:52

## 2019-01-01 RX ADMIN — ALBUMIN (HUMAN) 12.5 G: 0.25 INJECTION, SOLUTION INTRAVENOUS at 09:30

## 2019-01-01 RX ADMIN — LACTULOSE 15 ML: 10 SOLUTION ORAL at 16:00

## 2019-01-01 RX ADMIN — FUROSEMIDE 40 MG: 10 INJECTION, SOLUTION INTRAMUSCULAR; INTRAVENOUS at 08:27

## 2019-01-01 RX ADMIN — LACTULOSE 15 ML: 10 SOLUTION ORAL at 16:19

## 2019-01-01 RX ADMIN — MINERAL SUPPLEMENT IRON 300 MG / 5 ML STRENGTH LIQUID 100 PER BOX UNFLAVORED 27 MG: at 05:56

## 2019-01-01 RX ADMIN — Medication 10 ML: at 11:56

## 2019-01-01 RX ADMIN — SPIRONOLACTONE 100 MG: 25 TABLET ORAL at 08:49

## 2019-01-01 RX ADMIN — ALBUMIN (HUMAN) 12.5 G: 0.25 INJECTION, SOLUTION INTRAVENOUS at 17:56

## 2019-01-01 RX ADMIN — SPIRONOLACTONE 100 MG: 25 TABLET ORAL at 09:02

## 2019-01-01 RX ADMIN — LACTULOSE 15 ML: 10 SOLUTION ORAL at 22:10

## 2019-01-01 RX ADMIN — ALBUTEROL SULFATE 2.5 MG: 2.5 SOLUTION RESPIRATORY (INHALATION) at 14:06

## 2019-01-01 RX ADMIN — FERROUS SULFATE TAB 325 MG (65 MG ELEMENTAL FE) 325 MG: 325 (65 FE) TAB at 05:15

## 2019-01-01 RX ADMIN — GABAPENTIN 400 MG: 400 CAPSULE ORAL at 21:43

## 2019-01-01 RX ADMIN — MIRTAZAPINE 15 MG: 15 TABLET, FILM COATED ORAL at 23:38

## 2019-01-01 RX ADMIN — LACTULOSE 15 ML: 10 SOLUTION ORAL at 22:34

## 2019-01-01 RX ADMIN — GABAPENTIN 400 MG: 400 CAPSULE ORAL at 18:17

## 2019-01-01 RX ADMIN — GABAPENTIN 400 MG: 400 CAPSULE ORAL at 21:35

## 2019-01-01 NOTE — DISCHARGE SUMMARY
Hospitalist Discharge Summary     Admit Date:  2018  6:00 PM   Name:  Steve Newton   Age:  72 y.o.  :  1945   MRN:  [id number]   PCP:  Prince Gan MD  Treatment Team: Attending Provider: Carina Duenas MD; Utilization Review: Orville Choi RN; Care Manager: Taylor Julian Charge Nurse: Juan Carlos Zafar    Problem List for this Hospitalization:  Hospital Problems as of 2019 Date Reviewed: 2018          Codes Class Noted - Resolved POA    Volume overload ICD-10-CM: E87.70  ICD-9-CM: 276.69  2018 - Present Yes        Cirrhosis of liver with ascites ([facility] ICD-10-CM: K74.60, R18.8  ICD-9-CM: 571.5  2018 - Present Yes        * (Principal) Hyponatremia ICD-10-CM: E87.1  ICD-9-CM: 276.1  2018 - Present Yes        Ascites ICD-10-CM: R18.8  ICD-9-CM: 789.59  2018 - Present Yes        RESOLVED: Acute respiratory failure ([facility] ICD-10-CM: J96.00  ICD-9-CM: 518.81  2018 - 2018 Yes                Admission HPI from 2018:    \" Patient is a 72years old male with pmhx of alcoholic cirrhosis diagnosed in 2018, HTN, bronchitis admitted on  with hypo-osmolar hyponatremia along with ascites/abdominal distension and left LL PNA. In ER, Na was 116, CXR showing left lower lobe haziness concerning for effusion vs infiltrates. He is saturating at 97% on room air. Pt was started on IV albumin q6h along with IV lasix and oral aldactone. Pt has diuresed well since admission, is negative 3.5 ltrs fluid balance. Lasix can be switched to oral in next 24-48 hours if BP is borderline. Pt was also started on salt tablets, hyponatremia is improving, 122  (latest). Pt will need GI eval and IR guided paracentesis. Pt can possibly be discharged on Monday () following paracentesis. Pt will need oral lasix and aldactone on discharge. Pt is hemodynamically stable for transfer. Pt is on IV azithro and rocephin for possible LLL PNA.  CXR should be repeated in AM, if LLL opacity has improved, it's likely pleural effusion from massive ascites. \"    Hospital Course:  Pt was admitted and started on IV lasix gtt. Antibiotics stopped, doubt PNA bc no cough or other symptoms; more likely effusion. Pt not ill appearing and feels good now. PCT was also wnl. He diuresed well and Na improved some. GI saw and recommended LVP which was done 12/31. He was kept overnight to monitor BP. He became overloaded on home dose of diuretics so this will need to be increased. Gi initially suggested increasing diuretics but follow up physician suggested keeping same dose and doing fluid restriction. Pt does not think he can maintain a 1.5L fluid restriction so I will increase his home diuretics to previously planned increase which had not yet been started. He will try to maintain Na 2g restriction. His Na is still low but stable and pt denies ever having symptoms and he feels great today. GI and I are OK with discharge but he needs follow up with GI within a week to recheck labs and status. Losartan was stopped due to BP being low normal without it, and cardura decreased. His BP remains stable but will need rechecking    Follow up instructions and discharge meds at bottom of this note. Plan was discussed with pt, wife. All questions answered. Patient was stable at time of discharge. Diagnostic Imaging/Tests:   No results found. Echocardiogram results:  No results found for this visit on 12/29/18.       All Micro Results     None          Labs: Results:       BMP, Mg, Phos Recent Labs     01/01/19  0539 12/31/18 2004 12/31/18  1503 12/31/18  0549  12/30/18  0836   * 127* 127* 126*   < > 121*   K 4.1  --   --  3.5  --  3.8   CL 91*  --   --  92*  --  89*   CO2 29  --   --  27  --  24   AGAP 6*  --   --  7  --  8   BUN 13  --   --  12  --  12   CREA 1.20  --   --  1.08  --  1.04   CA 7.9*  --   --  7.8*  --  8.0*   *  --   --  131*  --  173*   MG 1.7*  --   -- --   --   --     < > = values in this interval not displayed.       CBC Recent Labs     12/30/18  0528   WBC 4.8   RBC 3.05*   HGB 9.1*   HCT 26.0*   *      LFT Recent Labs     12/30/18  0836 12/30/18  0528 12/29/18  1555   SGOT 34 31 37   ALT 30 28 30   AP 99 99 111   TP 6.6 6.2* 6.3   ALB 3.3 3.0* 3.0*   GLOB 3.3 3.2 3.3   AGRAT 1.0* 0.9* 0.9      Cardiac Testing Lab Results   Component Value Date/Time    BNP 72 12/04/2018 10:12 AM    Troponin-I, Qt. <0.02 (L) 12/28/2018 12:44 PM    Troponin-I, Qt. <0.02 (L) 11/19/2018 02:37 AM      Coagulation Tests Lab Results   Component Value Date/Time    Prothrombin time 16.5 (H) 12/30/2018 11:55 AM    Prothrombin time 15.0 (H) 09/14/2018 08:30 PM    INR 1.4 12/30/2018 11:55 AM    INR 1.2 09/14/2018 08:30 PM    aPTT 37.5 (H) 09/14/2018 08:30 PM      A1c No results found for: HBA1C, HGBE8, NKY5IPEW, EDO2RXXK   Lipid Panel Lab Results   Component Value Date/Time    Cholesterol, total 160 12/04/2018 10:12 AM    HDL Cholesterol 71 (H) 12/04/2018 10:12 AM    LDL, calculated 83.8 12/04/2018 10:12 AM    VLDL, calculated 5.2 (L) 12/04/2018 10:12 AM    Triglyceride 26 (L) 12/04/2018 10:12 AM    CHOL/HDL Ratio 2.3 12/04/2018 10:12 AM      Thyroid Panel No results found for: TSH, T4, FT4, TT3, T3U, TSHEXT, TSHEXT     Most Recent UA No results found for: COLOR, APPRN, REFSG, BHUPENDRA, PROTU, GLUCU, KETU, BILU, BLDU, UROU, MOSHE, LEUKU, WBCU, RBCU, UEPI, BACTU, CASTS, UCRY, MUCUS, UCOM     Allergies   Allergen Reactions    Hydromet [Hydrocodone-Homatropine] Nausea Only     Dry heaves     Immunization History   Administered Date(s) Administered    Influenza High Dose Vaccine PF 10/15/2018    TB Skin Test (PPD) Intradermal 12/28/2018       All Labs from Last 24 Hrs:  Recent Results (from the past 24 hour(s))   SODIUM    Collection Time: 12/31/18  3:03 PM   Result Value Ref Range    Sodium 127 (L) 136 - 145 mmol/L   SODIUM    Collection Time: 12/31/18  8:04 PM   Result Value Ref Range Sodium 127 (L) 136 - 688 mmol/L   METABOLIC PANEL, BASIC    Collection Time: 01/01/19  5:39 AM   Result Value Ref Range    Sodium 126 (L) 136 - 145 mmol/L    Potassium 4.1 3.5 - 5.1 mmol/L    Chloride 91 (L) 98 - 107 mmol/L    CO2 29 21 - 32 mmol/L    Anion gap 6 (L) 7 - 16 mmol/L    Glucose 128 (H) 65 - 100 mg/dL    BUN 13 8 - 23 MG/DL    Creatinine 1.20 0.8 - 1.5 MG/DL    GFR est AA >60 >60 ml/min/1.73m2    GFR est non-AA >60 >60 ml/min/1.73m2    Calcium 7.9 (L) 8.3 - 10.4 MG/DL   MAGNESIUM    Collection Time: 01/01/19  5:39 AM   Result Value Ref Range    Magnesium 1.7 (L) 1.8 - 2.4 mg/dL       Current Med List in Hospital:   Current Facility-Administered Medications   Medication Dose Route Frequency    furosemide (LASIX) tablet 80 mg  80 mg Oral DAILY    magnesium oxide (MAG-OX) tablet 400 mg  400 mg Oral DAILY    potassium chloride (K-DUR, KLOR-CON) SR tablet 40 mEq  40 mEq Oral DAILY    spironolactone (ALDACTONE) tablet 200 mg  200 mg Oral DAILY    enoxaparin (LOVENOX) injection 40 mg  40 mg SubCUTAneous Q24H    doxazosin (CARDURA) tablet 2 mg  2 mg Oral DAILY    acetaminophen (TYLENOL) tablet 650 mg  650 mg Oral Q6H PRN    morphine injection 1 mg  1 mg IntraVENous Q4H PRN    naloxone (NARCAN) injection 0.4 mg  0.4 mg IntraVENous PRN    sodium chloride (NS) flush 5-10 mL  5-10 mL IntraVENous Q8H    sodium chloride (NS) flush 5-10 mL  5-10 mL IntraVENous PRN    albuterol (PROVENTIL VENTOLIN) nebulizer solution 2.5 mg  2.5 mg Nebulization Q6H RT    gabapentin (NEURONTIN) capsule 400 mg  400 mg Oral TID    guaiFENesin-dextromethorphan (ROBITUSSIN DM) 100-10 mg/5 mL syrup 5 mL  5 mL Oral Q6H PRN    ondansetron (ZOFRAN) injection 4 mg  4 mg IntraVENous Q4H PRN    pantoprazole (PROTONIX) tablet 40 mg  40 mg Oral Q24H    phenazopyridine (PYRIDIUM) tab 95 mg  95 mg Oral TID       Discharge Exam:  Patient Vitals for the past 24 hrs:   Temp Pulse Resp BP SpO2   01/01/19 0510 98.7 °F (37.1 °C) 88 24 126/62 94 %   01/01/19 0030 99.2 °F (37.3 °C) 84 22 119/69 97 %   12/31/18 2056 99.9 °F (37.7 °C) 95 24 109/60 95 %   12/31/18 1952 -- -- -- -- 95 %   12/31/18 1527 99 °F (37.2 °C) 87 18 96/57 94 %   12/31/18 1357 -- -- -- -- 95 %   12/31/18 1231 -- 81 -- 109/67 95 %   12/31/18 1220 -- 79 -- 106/60 97 %   12/31/18 1215 -- 79 -- 108/66 96 %   12/31/18 1205 -- 78 -- 109/61 98 %   12/31/18 1156 -- 71 -- 108/64 97 %   12/31/18 1150 -- 76 -- 111/69 97 %   12/31/18 1145 -- 76 -- 110/68 96 %   12/31/18 1124 98.1 °F (36.7 °C) 73 20 119/64 96 %     Oxygen Therapy  O2 Sat (%): 94 % (01/01/19 0510)  Pulse via Oximetry: 93 beats per minute (12/31/18 1952)  O2 Device: Room air (12/31/18 1952)    Intake/Output Summary (Last 24 hours) at 1/1/2019 0750  Last data filed at 1/1/2019 0510  Gross per 24 hour   Intake 840 ml   Output 77289 ml   Net -00436 ml       *Note that automatically entered I/Os may not be accurate; dependent on patient compliance with collection and accurate  by assistants. General:          Well nourished. Alert. CV:                  RRR. No murmur, rub, or gallop. Lungs:             Decreased L base  Abdomen:        soft, distended. No tenderness  Extremities:     Warm and dry. No cyanosis. 3+ pitting edema BLE  Skin:                No rashes or jaundice. Neuro:             No gross focal deficits        Discharge Info:   Current Discharge Medication List      START taking these medications    Details   magnesium oxide (MAG-OX) 400 mg tablet Take 1 Tab by mouth daily. Qty: 30 Tab, Refills: 2         CONTINUE these medications which have CHANGED    Details   doxazosin (CARDURA) 2 mg tablet Take 1 Tab by mouth daily. Qty: 30 Tab, Refills: 2      furosemide (LASIX) 80 mg tablet Take 1 Tab by mouth daily. Qty: 30 Tab, Refills: 2      spironolactone (ALDACTONE) 100 mg tablet Take 2 Tabs by mouth daily.   Qty: 60 Tab, Refills: 2         CONTINUE these medications which have NOT CHANGED Details   ferrous sulfate (IRON) 325 mg (65 mg iron) tablet Take 27 mg by mouth Daily (before breakfast). pantoprazole (PROTONIX) 40 mg tablet Take 1 Tab by mouth daily. Qty: 30 Tab, Refills: 5    Associated Diagnoses: Gastroesophageal reflux disease with esophagitis      gabapentin (NEURONTIN) 400 mg capsule Take 1 Cap by mouth three (3) times daily. Qty: 90 Cap, Refills: 3         STOP taking these medications       losartan (COZAAR) 50 mg tablet Comments:   Reason for Stopping:                 Disposition: home    Activity: Activity as tolerated  Diet: DIET REGULAR 2 GM NA (House Low NA); FR 1500ML    No orders of the defined types were placed in this encounter. Follow-up Information     Follow up With Specialties Details Why Contact Info    Strong MD [facility] Call As needed [id number] [facility]  [phone]      Dessie Curling, MD Gastroenterology Schedule an appointment as soon as possible for a visit follow up, with repeat labs BMP, Magnesium [address]  [address]  [facility] [phone]            Time spent in patient discharge planning and coordination 35 minutes.     Signed:  Talia Cerda MD

## 2019-01-01 NOTE — DISCHARGE INSTRUCTIONS
Learning About Fluid Overload  What is fluid overload? Fluid overload means that your body has too much water. The extra fluid in your body can raise your blood pressure and force your heart to work harder. It can also make it hard for you to breathe. Most of your body is made up of water. The body uses minerals like sodium and potassium to help organs such as your heart, kidneys, and liver balance how much water you need. For example, the heart pumps blood to move water around the body. And the kidneys work to get rid of the water that the body doesn't need. Health conditions like kidney disease, heart failure, and cirrhosis can cause fluid overload. Other things can cause extra fluid to build up. IV fluids, some medicines, too much salt (sodium) from food, and certain medical treatments can sometimes cause this fluid increase. What are the symptoms? Some of the most common symptoms are:  · Gaining weight over a short period of time. · Swelling in the ankles or legs. · Shortness of breath. How is it treated? The goal of treatment is to remove the extra fluid in your body. Your treatment will depend on the cause. Your doctor may:  · Give you medicines, such as diuretics (also called \"water pills\"). They help your body get rid of the extra fluid. · Restrict your fluid or salt intake. Follow-up care is a key part of your treatment and safety. Be sure to make and go to all appointments, and call your doctor if you are having problems. It's also a good idea to know your test results and keep a list of the medicines you take. Where can you learn more? Go to http://josie-leonard.info/. Enter O110 in the search box to learn more about \"Learning About Fluid Overload. \"  Current as of: December 6, 2017  Content Version: 11.8  © 7743-6021 [employer].  Care instructions adapted under license by [employer] (which disclaims liability or warranty for this information). If you have questions about a medical condition or this instruction, always ask your healthcare professional. [address] any warranty or liability for your use of this information. Hyponatremia: Care Instructions  Your Care Instructions    Hyponatremia (say \"kh-ll-mwl-TREE-idania-uh\") means that you don't have enough sodium in your blood. It can cause nausea, vomiting, and headaches. Or you may not feel hungry. In serious cases, it can cause seizures, a coma, or even death. Hyponatremia is not a disease. It is a problem caused by something else, such as medicines or exercising for a long time in hot weather. You can get hyponatremia if you lose a lot of fluids and then you drink a lot of water or other liquids that don't have much sodium. You can also get it if you have kidney, liver, heart, or other health problems. Treatment is focused on getting your sodium levels back to normal.  Follow-up care is a key part of your treatment and safety. Be sure to make and go to all appointments, and call your doctor if you are having problems. It's also a good idea to know your test results and keep a list of the medicines you take. How can you care for yourself at home? · If your doctor recommends it, drink fluids that have sodium. Sports drinks are a good choice. Or you can eat salty foods. · If your doctor recommends it, limit the amount of water you drink. And limit fluids that are mostly water. These include tea, coffee, and juice. · Take your medicines exactly as prescribed. Call your doctor if you have any problems with your medicine. · Get your sodium levels tested when your doctor tells you to. When should you call for help? Call 911 anytime you think you may need emergency care.  For example, call if:    · You have a seizure.     · You passed out (lost consciousness).    Call your doctor now or seek immediate medical care if:    · You are confused or it is hard to focus.     · You have little or no appetite.     · You feel sick to your stomach or you vomit.     · You have a headache.     · You have mood changes.     · You feel more tired than usual.    Watch closely for changes in your health, and be sure to contact your doctor if:    · You do not get better as expected. Where can you learn more? Go to http://josie-leonard.info/. Enter T531 in the search box to learn more about \"Hyponatremia: Care Instructions. \"  Current as of: June 26, 2018  Content Version: 11.8  © 7069-3579 [employer]. Care instructions adapted under license by [employer] (which disclaims liability or warranty for this information). If you have questions about a medical condition or this instruction, always ask your healthcare professional. [address] any warranty or liability for your use of this information. DISCHARGE SUMMARY from Nurse    PATIENT INSTRUCTIONS:    After general anesthesia or intravenous sedation, for 24 hours or while taking prescription Narcotics:  · Limit your activities  · Do not drive and operate hazardous machinery  · Do not make important personal or business decisions  · Do  not drink alcoholic beverages  · If you have not urinated within 8 hours after discharge, please contact your surgeon on call. Report the following to your surgeon:  · Excessive pain, swelling, redness or odor of or around the surgical area  · Temperature over 100.5  · Nausea and vomiting lasting longer than 4 hours or if unable to take medications  · Any signs of decreased circulation or nerve impairment to extremity: change in color, persistent  numbness, tingling, coldness or increase pain  · Any questions    What to do at Home:  *  Please give a list of your current medications to your Primary Care Provider.     *  Please update this list whenever your medications are discontinued, doses are      changed, or new medications (including over-the-counter products) are added. *  Please carry medication information at all times in case of emergency situations. These are general instructions for a healthy lifestyle:    No smoking/ No tobacco products/ Avoid exposure to second hand smoke  Surgeon General's Warning:  Quitting smoking now greatly reduces serious risk to your health. Obesity, smoking, and sedentary lifestyle greatly increases your risk for illness    A healthy diet, regular physical exercise & weight monitoring are important for maintaining a healthy lifestyle    You may be retaining fluid if you have a history of heart failure or if you experience any of the following symptoms:  Weight gain of 3 pounds or more overnight or 5 pounds in a week, increased swelling in our hands or feet or shortness of breath while lying flat in bed. Please call your doctor as soon as you notice any of these symptoms; do not wait until your next office visit. Recognize signs and symptoms of STROKE:    F-face looks uneven    A-arms unable to move or move unevenly    S-speech slurred or non-existent    T-time-call 911 as soon as signs and symptoms begin-DO NOT go       Back to bed or wait to see if you get better-TIME IS BRAIN. Warning Signs of HEART ATTACK     Call 911 if you have these symptoms:   Chest discomfort. Most heart attacks involve discomfort in the center of the chest that lasts more than a few minutes, or that goes away and comes back. It can feel like uncomfortable pressure, squeezing, fullness, or pain.  Discomfort in other areas of the upper body. Symptoms can include pain or discomfort in one or both arms, the back, neck, jaw, or stomach.  Shortness of breath with or without chest discomfort.  Other signs may include breaking out in a cold sweat, nausea, or lightheadedness. Don't wait more than five minutes to call 911 - MINUTES MATTER! Fast action can save your life.  Calling 911 is almost always the fastest way to get lifesaving treatment. Emergency Medical Services staff can begin treatment when they arrive -- up to an hour sooner than if someone gets to the hospital by car. The discharge information has been reviewed with the patient. The patient verbalized understanding. Discharge medications reviewed with the patient and appropriate educational materials and side effects teaching were provided.   ___________________________________________________________________________________________________________________________________

## 2019-01-01 NOTE — PROGRESS NOTES
Problem: Falls - Risk of  Goal: *Absence of Falls  Document Nithya Fall Risk and appropriate interventions in the flowsheet.   Outcome: Progressing Towards Goal  Fall Risk Interventions:  Mobility Interventions: Bed/chair exit alarm         Medication Interventions: Bed/chair exit alarm    Elimination Interventions: Call light in reach

## 2019-01-01 NOTE — PROGRESS NOTES
Discharge instructions and prescriptions provided and explained to the pt. Medication information sheets for all new prescriptions given and reviewed with patient. Opportunity for questions provided. Pt waiting on ride. Instructed to call once ready to leave.

## 2019-01-03 NOTE — PROGRESS NOTES
Transition of Care Discharge Follow-up Questionnaire Date/Time of Call: 
 1/2/19 10:17 AM   
What was the patient hospitalized for? Patient was hospitalized for Hyponatremia Does the patient understand his/her diagnosis and/or treatment and what happened during the hospitalization? Spoke to Patient Daughter who consented to call and states understanding of diagnosis and treatment. Did the patient receive discharge instructions? Daughter states d/c instructions received. Care Coordinator Assessed Risk for Readmission:  
 
 
Patient stated Risk for Readmission: No readmission unless due to diagnosis and per Care Coordinator assessment. Daughter agrees. Review any discharge instructions (see discharge instructions/AVS in [facility]). Ask patient if they understand these. Do they have any questions? Daughter stated understanding of review of d/c instructions. Were home services ordered (nursing, PT, OT, ST, etc.)? No HH ordered at d/c. If so, has the first visit occurred? If not, why? (Assist with coordination of services if necessary.) 
 N/A. Was any DME ordered? No DME ordered at d/c. If so, has it been received? If not, why?  (Assist patient in obtaining DME orders &/or equipment if necessary.) N/A Complete a review of all medications (new, continued and discontinued meds per the D/C instructions and medication tab in [employer]). Care Coordinator reviewed medications and instructions as ordered and prescribed per CC and d/c instructions with Daughter. Mag-Ox prescribed at d/c. Were all new prescriptions filled? If not, why?  (Assist patient in obtaining medications if necessary  escalate for CCM &/or SW if ongoing issues are verbalized by pt or anticipated) Yes. Does the patient understand the purpose and dosing instructions for all medications? (If patient has questions, provide explanation and education.) Daughter stated understanding of uses and dosages of all medications. Does the patient have any problems in performing ADLs? (If patient is unable to perform ADLs  what is the limiting factor(s)? Do they have a support system that can assist? If no support system is present, discuss possible assistance that they may be able to obtain. Escalate for CCM/SW if ongoing issues are verbalized by pt or anticipated) Daughter states patient is independent with all ADLs. This note will not be viewable in [address] Does the patient have all follow-up appointments scheduled? 7 day f/up with PCP?  
(f/up with PCP may be w/in 14 days if patient has a f/up with their specialist w/in 7 days) 7-14 day f/up with specialist?  
(or per discharge instructions) If f/up has not been made  what actions has the care coordinator made to accomplish this? Has transportation been arranged? PCP f/u 1/7. GI f/u pending PCP referral to be completed 1/7 per daughter. No transportation needs per Daughter. Any other questions or concerns expressed by the patient? No further questions or needs identified or expressed by Daughter at this time. When is care coordinators next follow-up call scheduled? If referred for CCM  what RN care manager was the referral assigned? This Care Coordinator will not be the one to follow up. AILIN Call Completed By:  
Lawanda Riedel, LPN/ Care Coordinator RJHMMX:[phone] Carolyn [address] [place] [address] 
[email or web address]

## 2019-01-16 PROBLEM — J90 PLEURAL EFFUSION, LEFT: Status: ACTIVE | Noted: 2019-01-01

## 2019-01-16 NOTE — H&P (VIEW-ONLY)
[address] [place]. [address] [facility], [address] [facility] 
[phone] Patient Name:  Dinesh Samano YOB: 1945 Office Visit 1/16/2019 CHIEF COMPLAINT:   
Chief Complaint Patient presents with  New Patient  
  pneumonia, pleural effusion; cough x 1 year HISTORY OF PRESENT ILLNESS:   
The patient is a 72year old white male who is seen at the request of Dr. Lucas French for evaluation, treatment, and management of LLL pneumonia, left pleural effusion, and cough that has been present x 1 year. Patient is accompanied by his wife and their daughter. Patient moved here from [place] in September. Patient has a history of alcoholic cirrhosis with ascites. He has required paracentesis x 3 in 2018. Most recent was in late December with removal of 8.5 liters of fluid. Patient went to urgent care on 12/26/18 with worsening cough. CXR revealed LLL consolidation and left pleural effusion. He was started on Levaquin initially. He worsened and was actually hospitalized at [facility] 12/29/18 thru 1/1/19 with hyponatremia, ascites, and LLL pneumonia. CXR was unchanged. It was felt that the LLL consolidation was related to massive ascites. He denies any fever or chills. Cough is unchanged. Denies any reflux. Reports some PND. He is not on an ACE. He has never smoked. There is mild MERCEDES which is unchanged. No industrial exposures. Daughter reports that his CXR \"always shows pneumonia\" and for at least the past year. He had numerous CXRs in [place] and was frequently treated with antibiotics for pneumonia, but has never had CT of chest. 
 
 
Past Medical History:  
Diagnosis Date  Bronchitis  Cirrhosis of liver with ascites ([facility] [address]  Hypertension  Ulcer of ileum   
 ulcer Problem List  Date Reviewed: 1/7/2019 Codes Class Noted Pleural effusion, left ICD-10-CM: J90 ICD-9-CM: 511.9  1/16/2019  Volume overload ICD-10-CM: E87.70 
 ICD-9-CM: 276.69  12/28/2018 Cirrhosis of liver with ascites ([facility] ICD-10-CM: K74.60, R18.8 ICD-9-CM: 571.5  12/28/2018 Hyponatremia ICD-10-CM: E87.1 ICD-9-CM: 276.1  12/28/2018 Ascites ICD-10-CM: R18.8 ICD-9-CM: 789.59  12/28/2018 Precordial pain ICD-10-CM: R07.2 ICD-9-CM: 786.51  11/20/2018 Murmur, cardiac ICD-10-CM: R01.1 ICD-9-CM: 785.2  11/20/2018 Cough ICD-10-CM: R05 ICD-9-CM: 786.2  11/20/2018 Essential hypertension ICD-10-CM: I10 
ICD-9-CM: 401.9  10/30/2018 Pneumonia of left lower lobe due to infectious organism [facility]) ICD-10-CM: J18.1 ICD-9-CM: 502  10/8/2018 Alcoholic cirrhosis of liver with ascites ([facility] ICD-10-CM: K70.31 ICD-9-CM: 571.2  10/8/2018 Esophageal varices without bleeding (H[facility]) ICD-10-CM: I85.00 ICD-9-CM: 456.1  10/8/2018 Past Surgical History:  
Procedure Laterality Date  HX APPENDECTOMY  HX ORTHOPAEDIC    
 carpal tunnel  HX ORTHOPAEDIC    
 back surgery No flowsheet data found. Social History Socioeconomic History  Marital status:  Spouse name: Not on file  Number of children: Not on file  Years of education: Not on file  Highest education level: Not on file Social Needs  Financial resource strain: Not on file  Food insecurity - worry: Not on file  Food insecurity - inability: Not on file  Transportation needs - medical: Not on file  Transportation needs - non-medical: Not on file Occupational History  Not on file Tobacco Use  Smoking status: Never Smoker  Smokeless tobacco: Never Used Substance and Sexual Activity  Alcohol use: No  
  Comment: None since 7/2018  Drug use: No  
 Sexual activity: Not on file Other Topics Concern  Not on file Social History Narrative  and lives with wife. Has lived in [place] and [place]. Has one dog. Retired. Previously worked as . Family History Problem Relation Age of Onset  COPD Mother  Heart Attack Father 52 Allergies Allergen Reactions  Hydromet [Hydrocodone-Homatropine] Nausea Only Dry heaves Current Outpatient Medications Medication Sig  
 doxazosin (CARDURA) 2 mg tablet Take 1 Tab by mouth daily.  furosemide (LASIX) 80 mg tablet Take 1 Tab by mouth daily.  magnesium oxide (MAG-OX) 400 mg tablet Take 1 Tab by mouth daily.  spironolactone (ALDACTONE) 100 mg tablet Take 2 Tabs by mouth daily.  ferrous sulfate (IRON) 325 mg (65 mg iron) tablet Take 27 mg by mouth Daily (before breakfast).  pantoprazole (PROTONIX) 40 mg tablet Take 1 Tab by mouth daily.  gabapentin (NEURONTIN) 400 mg capsule Take 1 Cap by mouth three (3) times daily. (Patient taking differently: Take 400 mg by mouth three (3) times daily.) No current facility-administered medications for this visit. Review of Systems Constitutional: Negative for chills, diaphoresis, fever, malaise/fatigue and weight loss. HENT: Negative for congestion, ear discharge, ear pain, hearing loss, nosebleeds, sinus pain, sore throat and tinnitus. Eyes: Negative for blurred vision, pain and redness. Respiratory: Positive for cough, shortness of breath and wheezing. Negative for hemoptysis and sputum production. Cardiovascular: Positive for leg swelling. Negative for chest pain, palpitations, orthopnea and PND. Gastrointestinal: Negative for abdominal pain, blood in stool, constipation, diarrhea, heartburn, melena, nausea and vomiting. Genitourinary: Negative for dysuria, frequency, hematuria and urgency. Musculoskeletal: Negative for back pain, joint pain, myalgias and neck pain. Skin: Negative for itching and rash. Neurological: Negative for dizziness, tremors, focal weakness, seizures and headaches. Endo/Heme/Allergies: Negative for environmental allergies. Bruises/bleeds easily. Psychiatric/Behavioral: Negative for depression, hallucinations, memory loss and suicidal ideas. The patient is not nervous/anxious. PHYSICAL EXAM: 
 
Vitals:  
 01/16/19 0958 BP: 126/80 Pulse: 74 Resp: 16 Temp: 96.7 °F (35.9 °C) TempSrc: Temporal  
SpO2: 100% Comment: r/a Weight: 214 lb (97.1 kg) Height: 5' 9.5\" (1.765 m) Body mass index is 31.15 kg/m². GENERAL APPEARANCE: 
 The patient is obese and in no respiratory distress. HEENT: 
 PERRL. Conjunctivae unremarkable. Nasal mucosa is without epistaxis, exudate, or polyps. Gums and dentition are unremarkable. There is no oropharyngeal narrowing. TMs are clear. NECK/LYMPHATIC: 
 Symmetrical with no elevation of jugular venous pulsation. Trachea midline. No thyroid enlargement. No cervical adenopathy. LUNGS: 
 Normal respiratory effort with symmetrical lung expansion. Breath sounds decreased bilaterally, left worse than right with expiratory wheezing bilaterally. HEART: 
 There is a regular rate and rhythm. No rub or gallop. Grade 2/6 systolic murmur. There is 1-2+ edema in the lower extremities. ABDOMEN: 
 Firm and non-tender. No hepatosplenomegaly. Bowel sounds are normal.   
 SKIN: 
 There are no rashes, cyanosis, jaundice, or ecchymosis present. EXTREMITIES: 
 The extremities are unremarkable without clubbing, cyanosis, joint inflammation, degenerative, or ischemic change. MUSCULOSKELETAL: 
 There is no abnormal tone, muscle atrophy, or abnormal movement present. NEURO: 
 The patient is alert and oriented to person, place, and time. Memory appears intact and mood is normal.  No gross sensorimotor deficits are present. DIAGNOSTIC TESTS: 
 PCXR:  
Results for orders placed during the hospital encounter of 11/18/18 XR CHEST PORT Narrative Portable chest xray COMPARISON: September 14, 2018. CLINICAL HISTORY: Chest pain FINDINGS: 
 
 There is left basilar density. Right lung is clear. No pneumothorax, pulmonary 
edema or large pleural effusion. Cardiomediastinal contour is within normal 
limits. Surrounding bones are unremarkable. Impression IMPRESSION: 
 
Mild left basilar density, likely atelectasis or consolidation. CXR PA and lateral:   
Results for orders placed during the hospital encounter of 12/28/18 XR CHEST PA LAT Narrative Two view chest 
 
History: sob,  recent chest x ray x 1 week ago showing possible pneumonia. Comparison: 11/18/2018 Findings: The heart and mediastinal silhouette are normal in size and 
configuration. There is dense left lower lobe opacity suggesting airspace 
disease and small left pleural effusion. These findings have progressed since 
the previous exam. The right lung appears grossly clear. The pulmonary 
vascularity is within normal limits. The visualized osseous structures are 
unremarkable. Impression Impression: Increasing left lower lobe opacity as described. Screening chest CT: No results found for this or any previous visit. CT of chest without contrast:   No results found for this or any previous visit. CT of chest with contrast:  No results found for this or any previous visit. PET/CT: No results found for this or any previous visit. Spirometry:  01.16.19 
 
 
 
  
 
 
ASSESSMENT:  (Medical Decision Making) ICD-10-CM ICD-9-CM 1. Cough Present x 1 year. Further work up is needed R05 786.2 AMB POC SPIROMETRY 2. Pneumonia of left lower lobe due to infectious organism [facility]) LLL consolidation--doubt this is an acute infiltrate. J18.1 486 3. Pleural effusion, left Related to #5 J90 511.9 4. Restrictive lung disease Likely related to #2 and #3 above J98.4 518.89   
 5. Alcoholic cirrhosis of liver with ascites PLAN: 
Reviewed CXR and discussed case with Dr. Marianela Alarcon. CT of chest without contrast soon. Once CT is reviewed, he will likely need outpatient bronch, possible t-cent, and possible repeat paracentesis. Discussed with patient and family that cough is likely related to the LLL consolidation, effusion, and ascites. Recent echo does not reveal any evidence of heart failure. Currently his ascites is being managed with diuretics. Orders Placed This Encounter  AMB POC SPIROMETRY  CT CHEST WO CONT Follow up TBD based upon CT of chest.  Follow up appointment when made will need to be with physician due to complexity of case. Collaborating physician is Dr. Brittany Alonso. Over 50% of today's office visit was spent in face to face time reviewing test results/records, prognosis, importance of compliance, education about disease process, benefits of medications, instructions for management of acute flare-ups, and follow up plans. Total time spent was 45 minutes. Vonda Ortega NP Electronically signed Dictated using voice recognition software.   Proof read but unrecognized errors may exist.

## 2019-02-05 PROBLEM — J98.11 ATELECTASIS: Status: ACTIVE | Noted: 2019-01-01

## 2019-02-05 NOTE — INTERVAL H&P NOTE
H&P Update:  Erin Aguila was seen and examined. History and physical has been reviewed. The patient has been examined.  There have been no significant clinical changes since the completion of the originally dated History and Physical.    Signed By: Blair Mckeon MD     February 5, 2019 2:33 PM

## 2019-02-05 NOTE — DISCHARGE INSTRUCTIONS
RESPIRATORY CARE - BRONCHOSCOPY - DISCHARGE INSTRUCTIONS      You received a lot of numbing medication for your throat and nose, and you also received medication to make you sleepy during your procedure. Because of this and because the bronchoscopy may have irritated your airways, we ask that you follow these directions:    1. Do not eat or drink until  3:30pm .   After that, you may have what you please. You may want to try some liquids first, because your throat may be a little sore. 2. Medication may cause drowsiness for several hours, therefore, do not drive or operate machinery for remainder of the day. No alcohol today. 3. You may cough up more mucus than usual and you may see some blood, but this is expected and should subside by the following day. 4. If severe throat irritation, coughing, or bleeding continue, call your doctor. 5.         If you run a fever greater than 102, take tylenol and motrin then call [facility] at [phone]. 6.         Dr. Kelly Samuels has asked that you:                A. Call the doctor's office at [phone] for any questions or problems that may occur. Sloan Cid your previously scheduled follow up appointment as before procedure. Discharge Medications:  Any additional instructions:  Resume all medication as before procedure using caution with any pain medication.

## 2019-02-05 NOTE — ROUTINE PROCESS
VSS. No complaints noted. Education reviewed and signed with patient and wife. Pt discharged via wheelchair by Cally Burrell RN.

## 2019-02-05 NOTE — PROCEDURES
PROCEDURE    Bronchoscopy with airway inspection. INDICATION   Atelectasis    EQUIPMENT:    Olympus Q 180 Bronchhoscope. ANESTHESIA    Concious sedation with: Fentanyl 100 mcg IV; Versed 2mg IV; Lidocaine  200 mg to tracheo-bronchial tree and vocal cords;     AIRWAY INSPECTION    After obtaining informed consent, using a bite block, an Olympus Q180 video/fiberoptic bronchoscope was  introduced into the trachea through the vocal chords, without complication. RIGHT    LOCATION NORM/ABNORM DESCRIPTION   VOCAL CORDS NL    TRACHEA NL    PRAVEENA NL    RMSB NL    RUL NL    BI NL    RML NL    SUP SEGM RLL NL    MED BASAL NL    ANTERIOR BASAL NL    LATERAL BASAL NL    POSTERIOR BASAL NL                                              LEFT    LOCATION NORMAL/ABNORMAL TYPE   LMSB NL    KONSTANTIN NL    LINGULA NL    SUPERIOR DIVISION NL    SUPERIOR SEG LLL NL    FRANCISCO-MEDIAL LLL NL    LATERAL LLL NL    POSTERIOR LLL NL        The procedure was completed  without complication and the patient tolerated the procedure well.     EBL: none    Recommendations:  CCRx  Ursula Guerrero MD

## 2019-02-08 NOTE — PROGRESS NOTES
Patient is Alert and discharge instructions given and received. Patient is preparing for discharge. 9,000ml of paracentesis fluid obtained for this procedure. 8 bottles of 12.5 Grams of albumin given as ordered per Dr. Celeste Zuniga and verified by Dr. Sona Alcala.

## 2019-02-08 NOTE — DISCHARGE INSTRUCTIONS
[address]  Department of Interventional Radiology  [address] [place] [address] [address] [facility]  [phone] Office  [phone] Fax    PARACENTESIS DISCHARGE INSTRUCTIONS    General Information:  During this procedure, the doctor will insert a needle into the abdomen to drain fluid. After the procedure, you will be able to take a deep breath much easier. The site of the puncture may ooze the first day. This will decrease and eventually stop. Paracentesis (draining fluid from the abdomen) sometimes makes patients hypotensive (low blood pressure). Your doctor may order for you to receive fluids or albumin (a volume booster) during the procedure through an IV site. Home Care Instructions:  Keep the puncture site clean and dry. No tub baths or swimming until puncture site heals. Showering is acceptable. Resume your normal diet, and resume your normal activity slowly and as you tolerate. If you are short of breath, rest. If shortness of breath does not ease, please call your ordering doctor. Fluid can re-accumulate in the chest and/or in the abdomen. If this should occur, your doctor needs to know as you may need to have the procedure done again. Call If:     You should call your Physician and/or the Radiology Nurse if you notice any signs of infection, like pus draining, or if it is swollen or reddened. Also call if you have a fever, or if you are bleeding from the puncture site more than a small amount on the dressing. Call if the puncture site keeps draining fluid. Some oozing is to be expected, but should slow and then stop. Call if you feel like you have pressure in your abdomen. SEEK IMMEDIATE CARE OR CALL 911 IF YOU SUDDENLY HAVE TROUBLE BREATHING, OR IF YOUR LIPS TURN BLUE, OR IF YOU NOTICE BLOOD IN YOUR SPUTUM. Follow-Up Instructions: Please see your ordering doctor as he/she has requested. To Reach Us: If you have any questions about your procedure, please call the Interventional Radiology department at [phone]. After business hours (5pm) and weekends, call the answering service at [phone] and ask for the Radiologist on call to be paged. Si tiene Preguntas acerca del procedimiento, por favor llame al departamento de Radiología Intervencional al [phone]. Después de horas de oficina (5 pm) y los fines de [place], llamar al Madolyn Lars de llamadas al [phone] y pregunte por el Radiologo de [place] Joey. Interventional Radiology General Nurse Discharge    After general anesthesia or intravenous sedation, for 24 hours or while taking prescription Narcotics:  · Limit your activities  · Do not drive and operate hazardous machinery  · Do not make important personal or business decisions  · Do  not drink alcoholic beverages  · If you have not urinated within 8 hours after discharge, please contact your surgeon on call. * Please give a list of your current medications to your Primary Care Provider. * Please update this list whenever your medications are discontinued, doses are     changed, or new medications (including over-the-counter products) are added. * Please carry medication information at all times in case of emergency situations. These are general instructions for a healthy lifestyle:    No smoking/ No tobacco products/ Avoid exposure to second hand smoke  Surgeon General's Warning:  Quitting smoking now greatly reduces serious risk to your health. Obesity, smoking, and sedentary lifestyle greatly increases your risk for illness  A healthy diet, regular physical exercise & weight monitoring are important for maintaining a healthy lifestyle    You may be retaining fluid if you have a history of heart failure or if you experience any of the following symptoms:  Weight gain of 3 pounds or more overnight or 5 pounds in a week, increased swelling in our hands or feet or shortness of breath while lying flat in bed.   Please call your doctor as soon as you notice any of these symptoms; do not wait until your next office visit. Recognize signs and symptoms of STROKE:  F-face looks uneven    A-arms unable to move or move unevenly    S-speech slurred or non-existent    T-time-call 911 as soon as signs and symptoms begin-DO NOT go       Back to bed or wait to see if you get better-TIME IS BRAIN.           Date: 2/8/2019  Discharging Nurse: Ailyn Mckeon

## 2019-02-21 NOTE — PROGRESS NOTES
Interventional Radiology Post Paracentesis Note    2/21/2019    Procedure(s): Ultrasound guided Diagnostic/Therapeutic  Paracentesis Performed with 8 Surinamese catheter total volume 6000 ml. Samples sent to lab. Preliminary Findings: small clear and yellow. Complications: None    Plan:  Observation, labs sent at this time.           Chest X-Ray:  no    Full dictated report to follow    Signed By: Brenda Stock RN

## 2019-02-21 NOTE — DISCHARGE INSTRUCTIONS
[address] [facility]  Department of Interventional Radiology  [facility]  [phone] Office  [phone] Fax    PARACENTESIS DISCHARGE INSTRUCTIONS    General Information:  During this procedure, the doctor will insert a needle into the abdomen to drain fluid. After the procedure, you will be able to take a deep breath much easier. The site of the puncture may ooze the first day. This will decrease and eventually stop. Paracentesis (draining fluid from the abdomen) sometimes makes patients hypotensive (low blood pressure). Your doctor may order for you to receive fluids or albumin (a volume booster) during the procedure through an IV site. Home Care Instructions:  Keep the puncture site clean and dry. No tub baths or swimming until puncture site heals. Showering is acceptable. Resume your normal diet, and resume your normal activity slowly and as you tolerate. If you are short of breath, rest. If shortness of breath does not ease, please call your ordering doctor. Fluid can re-accumulate in the chest and/or in the abdomen. If this should occur, your doctor needs to know as you may need to have the procedure done again. Call If:     You should call your Physician and/or the Radiology Nurse if you notice any signs of infection, like pus draining, or if it is swollen or reddened. Also call if you have a fever, or if you are bleeding from the puncture site more than a small amount on the dressing. Call if the puncture site keeps draining fluid. Some oozing is to be expected, but should slow and then stop. Call if you feel like you have pressure in your abdomen. SEEK IMMEDIATE CARE OR CALL 911 IF YOU SUDDENLY HAVE TROUBLE BREATHING, OR IF YOUR LIPS TURN BLUE, OR IF YOU NOTICE BLOOD IN YOUR SPUTUM. Follow-Up Instructions: Please see your ordering doctor as he/she has requested. To Reach Us:   If you have any questions about your procedure, please call the Interventional Radiology department at [phone]. After business hours (5pm) and weekends, call the answering service at [phone] and ask for the Radiologist on call to be paged. Interventional Radiology General Nurse Discharge    After general anesthesia or intravenous sedation, for 24 hours or while taking prescription Narcotics:  · Limit your activities  · Do not drive and operate hazardous machinery  · Do not make important personal or business decisions  · Do  not drink alcoholic beverages  · If you have not urinated within 8 hours after discharge, please contact your surgeon on call. * Please give a list of your current medications to your Primary Care Provider. * Please update this list whenever your medications are discontinued, doses are     changed, or new medications (including over-the-counter products) are added. * Please carry medication information at all times in case of emergency situations. These are general instructions for a healthy lifestyle:    No smoking/ No tobacco products/ Avoid exposure to second hand smoke  Surgeon General's Warning:  Quitting smoking now greatly reduces serious risk to your health. Obesity, smoking, and sedentary lifestyle greatly increases your risk for illness  A healthy diet, regular physical exercise & weight monitoring are important for maintaining a healthy lifestyle    You may be retaining fluid if you have a history of heart failure or if you experience any of the following symptoms:  Weight gain of 3 pounds or more overnight or 5 pounds in a week, increased swelling in our hands or feet or shortness of breath while lying flat in bed. Please call your doctor as soon as you notice any of these symptoms; do not wait until your next office visit.     Recognize signs and symptoms of STROKE:  F-face looks uneven    A-arms unable to move or move unevenly    S-speech slurred or non-existent    T-time-call 911 as soon as signs and symptoms begin-DO NOT go       Back to bed or wait to see if you get better-TIME IS BRAIN.         Date: 2/21/2019  Discharging Nurse: Merlin Engle RN

## 2019-02-21 NOTE — PROGRESS NOTES
Verbalized understanding of discharge orders. Left abdomen site C,D,i. No bleeding or hematoma. discharged home with wife and daughter.

## 2019-03-06 NOTE — PROGRESS NOTES
Interventional Radiology Post Paracentesis/Thoracentesis Note    3/6/2019    Procedure(s): Ultrasound guided Diagnostic Paracentesis Performed with 8 Norwegian catheter total volume 8000 ml. Samples sent to lab. Preliminary Findings: large clear and yellow. Complications: None    Plan:  Observation, check labs if drawn.           Chest X-Ray:  no    Full dictated report to follow    Signed By: Trevon Rossi RN

## 2019-03-06 NOTE — DISCHARGE INSTRUCTIONS
[address]  Department of Interventional Radiology  [facility]  [phone] Office  [phone] Fax    PARACENTESIS DISCHARGE INSTRUCTIONS    General Information:  During this procedure, the doctor will insert a needle into the abdomen to drain fluid. After the procedure, you will be able to take a deep breath much easier. The site of the puncture may ooze the first day. This will decrease and eventually stop. Paracentesis (draining fluid from the abdomen) sometimes makes patients hypotensive (low blood pressure). Your doctor may order for you to receive fluids or albumin (a volume booster) during the procedure through an IV site. Home Care Instructions:  Keep the puncture site clean and dry. No tub baths or swimming until puncture site heals. Showering is acceptable. Resume your normal diet, and resume your normal activity slowly and as you tolerate. If you are short of breath, rest. If shortness of breath does not ease, please call your ordering doctor. Fluid can re-accumulate in the chest and/or in the abdomen. If this should occur, your doctor needs to know as you may need to have the procedure done again. Call If:     You should call your Physician and/or the Radiology Nurse if you notice any signs of infection, like pus draining, or if it is swollen or reddened. Also call if you have a fever, or if you are bleeding from the puncture site more than a small amount on the dressing. Call if the puncture site keeps draining fluid. Some oozing is to be expected, but should slow and then stop. Call if you feel like you have pressure in your abdomen. SEEK IMMEDIATE CARE OR CALL 911 IF YOU SUDDENLY HAVE TROUBLE BREATHING, OR IF YOUR LIPS TURN BLUE, OR IF YOU NOTICE BLOOD IN YOUR SPUTUM. Follow-Up Instructions: Please see your ordering doctor as he/she has requested. To Reach Us:     If you have any questions about your procedure, please call the Interventional Radiology department at [phone]. After business hours (5pm) and weekends, call the answering service at [phone] and ask for the Radiologist on call to be paged. Interventional Radiology General Nurse Discharge    After general anesthesia or intravenous sedation, for 24 hours or while taking prescription Narcotics:  · Limit your activities  · Do not drive and operate hazardous machinery  · Do not make important personal or business decisions  · Do  not drink alcoholic beverages  · If you have not urinated within 8 hours after discharge, please contact your surgeon on call. * Please give a list of your current medications to your Primary Care Provider. * Please update this list whenever your medications are discontinued, doses are     changed, or new medications (including over-the-counter products) are added. * Please carry medication information at all times in case of emergency situations. These are general instructions for a healthy lifestyle:    No smoking/ No tobacco products/ Avoid exposure to second hand smoke  Surgeon General's Warning:  Quitting smoking now greatly reduces serious risk to your health. Obesity, smoking, and sedentary lifestyle greatly increases your risk for illness  A healthy diet, regular physical exercise & weight monitoring are important for maintaining a healthy lifestyle    You may be retaining fluid if you have a history of heart failure or if you experience any of the following symptoms:  Weight gain of 3 pounds or more overnight or 5 pounds in a week, increased swelling in our hands or feet or shortness of breath while lying flat in bed. Please call your doctor as soon as you notice any of these symptoms; do not wait until your next office visit.     Recognize signs and symptoms of STROKE:  F-face looks uneven    A-arms unable to move or move unevenly    S-speech slurred or non-existent    T-time-call 911 as soon as signs and symptoms begin-DO NOT go       Back to bed or wait to see if you get better-TIME IS BRAIN.       Date: 3/6/2019  Discharging Nurse: Maggi Parsons RN

## 2019-03-25 NOTE — PROGRESS NOTES
Patient is Alert and discharge instructions given and received. Patient being discharged at this time.

## 2019-03-26 PROBLEM — K65.2 SBP (SPONTANEOUS BACTERIAL PERITONITIS) (HCC): Status: ACTIVE | Noted: 2019-01-01

## 2019-03-26 PROBLEM — E87.70 VOLUME OVERLOAD: Status: RESOLVED | Noted: 2018-01-01 | Resolved: 2019-01-01

## 2019-03-26 PROBLEM — K74.60 CIRRHOSIS OF LIVER WITH ASCITES (HCC): Status: RESOLVED | Noted: 2018-01-01 | Resolved: 2019-01-01

## 2019-03-26 PROBLEM — D72.829 LEUKOCYTOSIS: Status: ACTIVE | Noted: 2019-01-01

## 2019-03-26 PROBLEM — R18.8 CIRRHOSIS OF LIVER WITH ASCITES (HCC): Status: RESOLVED | Noted: 2018-01-01 | Resolved: 2019-01-01

## 2019-03-26 PROBLEM — R07.2 PRECORDIAL PAIN: Status: RESOLVED | Noted: 2018-01-01 | Resolved: 2019-01-01

## 2019-03-26 PROBLEM — R18.8 ASCITES: Status: RESOLVED | Noted: 2018-01-01 | Resolved: 2019-01-01

## 2019-03-26 PROBLEM — J98.11 ATELECTASIS: Status: RESOLVED | Noted: 2019-01-01 | Resolved: 2019-01-01

## 2019-03-26 PROBLEM — D64.9 NORMOCYTIC ANEMIA: Status: ACTIVE | Noted: 2019-01-01

## 2019-03-26 PROBLEM — R05.9 COUGH: Status: RESOLVED | Noted: 2018-01-01 | Resolved: 2019-01-01

## 2019-03-26 PROBLEM — J18.9 PNEUMONIA OF LEFT LOWER LOBE DUE TO INFECTIOUS ORGANISM: Status: RESOLVED | Noted: 2018-01-01 | Resolved: 2019-01-01

## 2019-03-26 PROBLEM — R11.2 NAUSEA & VOMITING: Status: ACTIVE | Noted: 2019-01-01

## 2019-03-26 PROBLEM — N17.9 AKI (ACUTE KIDNEY INJURY) (HCC): Status: ACTIVE | Noted: 2019-01-01

## 2019-03-26 PROBLEM — E87.5 HYPERKALEMIA: Status: ACTIVE | Noted: 2019-01-01

## 2019-03-26 NOTE — ED PROVIDER NOTES
Patient is seen by me in triage to facilitate initial workup. He is a 82-year-old male with a history of cirrhosis who comes to the ER today complaining of abdominal pain, nausea, vomiting since an outpatient paracentesis procedure was performed yesterday. He states he has had lots of these procedures performed in the past and never had these complications. He denies any fever. His bowels have been moving. Afebrile in triage abdomen is distended but no peritoneal signs basic blood work has been ordered and he is awaiting placement in a room to complete his evaluation Sebastian Carrera MD 
 
 
345 PM 
82-year-old gentleman had paracentesis about 30 hours ago. He has not eaten or drunk very much in the last 48 hours. He felt increasingly weak and had trouble standing and walking this morning. No fever or chills. He does have a chronic productive cough. Complains of a 24-hour history of increasing aching in his abdomen with some slight vomiting. No diarrhea or bleeding in his bowels. The history is provided by the patient. Abdominal Pain This is a new problem. The current episode started 12 to 24 hours ago. The problem occurs constantly. The problem has been gradually worsening. The pain is associated with vomiting. The pain is located in the generalized abdominal region. The quality of the pain is dull. The pain is moderate. Associated symptoms include nausea and vomiting. Pertinent negatives include no fever, no diarrhea, no hematochezia, no melena, no constipation, no dysuria, no frequency, no hematuria, no headaches, no chest pain and no back pain. Nothing worsens the pain. The pain is relieved by nothing. His past medical history does not include gallstones, pancreatitis or diverticulitis. The patient's surgical history includes appendectomy. Past Medical History:  
Diagnosis Date  Bronchitis  Cirrhosis of liver with ascites ([address]  Hypertension  Ulcer of ileum   
 ulcer Past Surgical History:  
Procedure Laterality Date  HX APPENDECTOMY  HX ORTHOPAEDIC    
 carpal tunnel  HX ORTHOPAEDIC    
 back surgery Family History:  
Problem Relation Age of Onset  COPD Mother  Heart Attack Father 52 Social History Socioeconomic History  Marital status:  Spouse name: Not on file  Number of children: Not on file  Years of education: Not on file  Highest education level: Not on file Occupational History  Not on file Social Needs  Financial resource strain: Not on file  Food insecurity:  
  Worry: Not on file Inability: Not on file  Transportation needs:  
  Medical: Not on file Non-medical: Not on file Tobacco Use  Smoking status: Never Smoker  Smokeless tobacco: Never Used Substance and Sexual Activity  Alcohol use: No  
  Comment: None since 7/2018  Drug use: No  
 Sexual activity: Not on file Lifestyle  Physical activity:  
  Days per week: Not on file Minutes per session: Not on file  Stress: Not on file Relationships  Social connections:  
  Talks on phone: Not on file Gets together: Not on file Attends Shinto service: Not on file Active member of club or organization: Not on file Attends meetings of clubs or organizations: Not on file Relationship status: Not on file  Intimate partner violence:  
  Fear of current or ex partner: Not on file Emotionally abused: Not on file Physically abused: Not on file Forced sexual activity: Not on file Other Topics Concern  Not on file Social History Narrative  and lives with wife. Has lived in [place] and [place]. Has one dog. Retired. Previously worked as . ALLERGIES: Hydromet [hydrocodone-homatropine] Review of Systems Constitutional: Negative for chills and fever. Respiratory: Negative for cough and shortness of breath. Cardiovascular: Negative for chest pain and palpitations. Gastrointestinal: Positive for abdominal pain, nausea and vomiting. Negative for constipation, diarrhea, hematochezia and melena. Genitourinary: Negative for dysuria, flank pain, frequency and hematuria. Musculoskeletal: Negative for back pain and neck pain. Skin: Negative for color change and rash. Neurological: Negative for syncope and headaches. All other systems reviewed and are negative. Vitals:  
 03/26/19 1440 BP: 103/68 Pulse: 83 Resp: 16 Temp: 98.2 °F (36.8 °C) SpO2: 98% Weight: 87.1 kg (192 lb) Height: 5' 9\" (1.753 m) Physical Exam  
Constitutional: He is oriented to person, place, and time. He appears well-developed and well-nourished. No distress. HENT:  
Head: Normocephalic and atraumatic. Right Ear: External ear normal.  
Left Ear: External ear normal.  
Mouth/Throat: Oropharynx is clear and moist. No oropharyngeal exudate. Eyes: Pupils are equal, round, and reactive to light. Conjunctivae and EOM are normal.  
Neck: Normal range of motion. Neck supple. Cardiovascular: Normal rate, regular rhythm and intact distal pulses. No murmur heard. Pulmonary/Chest: Breath sounds normal. No respiratory distress. Abdominal: Soft. Bowel sounds are normal. He exhibits distension, fluid wave and ascites. He exhibits no mass. There is generalized tenderness. There is no rebound and no guarding. No hernia. Neurological: He is alert and oriented to person, place, and time. Gait normal.  
Nl speech Skin: Skin is warm and dry. Psychiatric: He has a normal mood and affect. His speech is normal.  
Nursing note and vitals reviewed. MDM Number of Diagnoses or Management Options Diagnosis management comments: Check for dehydration or electrolyte abnormalities. CT scan to assess for complications of paracentesis. Amount and/or Complexity of Data Reviewed Clinical lab tests: ordered and reviewed Tests in the radiology section of CPT®: ordered and reviewed Review and summarize past medical records: yes (Admission to hospital here about 10 weeks ago due to problems with pneumonia and hyponatremia.) Risk of Complications, Morbidity, and/or Mortality Presenting problems: moderate Diagnostic procedures: low Management options: moderate Patient Progress Patient progress: stable Procedures Results Include: 
 
Recent Results (from the past 24 hour(s)) PROTHROMBIN TIME + INR Collection Time: 03/26/19  2:44 PM  
Result Value Ref Range Prothrombin time 16.5 (H) 11.7 - 14.5 sec INR 1.4 PTT Collection Time: 03/26/19  2:44 PM  
Result Value Ref Range aPTT 37.0 24.7 - 39.8 SEC  
CBC WITH AUTOMATED DIFF Collection Time: 03/26/19  2:44 PM  
Result Value Ref Range WBC 15.5 (H) 4.3 - 11.1 K/uL  
 RBC 3.74 (L) 4.23 - 5.6 M/uL  
 HGB 11.2 (L) 13.6 - 17.2 g/dL HCT 32.6 (L) 41.1 - 50.3 % MCV 87.2 79.6 - 97.8 FL  
 MCH 29.9 26.1 - 32.9 PG  
 MCHC 34.4 31.4 - 35.0 g/dL  
 RDW 15.7 (H) 11.9 - 14.6 % PLATELET 341 112 - 087 K/uL MPV 10.4 9.4 - 12.3 FL ABSOLUTE NRBC 0.00 0.0 - 0.2 K/uL  
 DF AUTOMATED NEUTROPHILS 83 (H) 43 - 78 % LYMPHOCYTES 4 (L) 13 - 44 % MONOCYTES 12 4.0 - 12.0 % EOSINOPHILS 0 (L) 0.5 - 7.8 % BASOPHILS 0 0.0 - 2.0 % IMMATURE GRANULOCYTES 1 0.0 - 5.0 %  
 ABS. NEUTROPHILS 12.8 (H) 1.7 - 8.2 K/UL  
 ABS. LYMPHOCYTES 0.6 0.5 - 4.6 K/UL  
 ABS. MONOCYTES 1.9 (H) 0.1 - 1.3 K/UL  
 ABS. EOSINOPHILS 0.0 0.0 - 0.8 K/UL  
 ABS. BASOPHILS 0.0 0.0 - 0.2 K/UL  
 ABS. IMM. GRANS. 0.2 0.0 - 0.5 K/UL METABOLIC PANEL, COMPREHENSIVE Collection Time: 03/26/19  2:44 PM  
Result Value Ref Range Sodium 118 (LL) 136 - 145 mmol/L Potassium 6.0 (H) 3.5 - 5.1 mmol/L Chloride 86 (L) 98 - 107 mmol/L  
 CO2 24 21 - 32 mmol/L Anion gap 8 7 - 16 mmol/L Glucose 150 (H) 65 - 100 mg/dL  BUN 28 (H) 8 - 23 MG/DL  
 Creatinine 1.87 (H) 0.8 - 1.5 MG/DL  
 GFR est AA 46 (L) >60 ml/min/1.73m2 GFR est non-AA 38 (L) >60 ml/min/1.73m2 Calcium 8.7 8.3 - 10.4 MG/DL Bilirubin, total 1.8 (H) 0.2 - 1.1 MG/DL  
 ALT (SGPT) 31 12 - 65 U/L  
 AST (SGOT) 56 (H) 15 - 37 U/L Alk. phosphatase 105 50 - 136 U/L Protein, total 6.1 (L) 6.3 - 8.2 g/dL Albumin 2.7 (L) 3.2 - 4.6 g/dL Globulin 3.4 2.3 - 3.5 g/dL A-G Ratio 0.8 (L) 1.2 - 3.5 LIPASE Collection Time: 03/26/19  2:44 PM  
Result Value Ref Range Lipase 253 73 - 393 U/L Xr Chest Pa Lat Result Date: 3/26/2019 EXAM: 2 view chest radiograph. ADDITIONAL CLINICAL INFORMATION: 72year-old cough COMPARISON: Chest radiograph dated December 28, 2018. FINDINGS: Mild left basilar atelectasis. No pneumothorax. No pleural effusion. The heart, mediastinum, john, and pulmonary vasculature are within normal limits. No evidence of acute osseous abnormality. IMPRESSION: 1. Mild left basilar atelectasis without a focal consolidation to suggest pneumonia. Ct Abd Pelv Wo Cont Result Date: 3/26/2019 EXAM: CT of the abdomen and pelvis without contrast. ADDITIONAL CLINICAL INFORMATION: Cirrhosis with paracentesis yesterday now with abdominal distention with nausea and vomiting. Comparison: Abdominal ultrasound dated March 25, 2019. Multiple axial images were obtained through the abdomen and pelvis without IV contrast.  Radiation dose reduction techniques were used for this study: All CT scans performed at this facility use one or all of the following: Automated exposure control, adjustment of the mA and/or kVp according to patient's size, iterative reconstruction. FINDINGS: LOWER THORAX: Moderately sized left-sided pleural effusion with atelectatic change of the left lung base. Extensive coronary artery calcifications.  LIVER: Atrophic liver with prominent macronodular contour consistent with cirrhosis, slight caudate lobe hypertrophy. Cannot evaluate for hepatic lesions with the lack of IV contrast. BILIARY SYSTEM: The gallbladder is contracted with slight wall thickening and with evidence of cholelithiasis. SPLEEN: Borderline splenomegaly. PANCREAS: No pancreatic mass. No pancreatic duct dilation. ADRENALS: No adrenal nodule or adrenal hypertrophy. URINARY SYSTEM: Left lower pole exophytic renal cyst measuring 3 cm. Left superior pole exophytic 1.9 cm intermediate density lesion which is incompletely characterized. FLUID:  There is a large volume of ascites within the abdomen and pelvis. REPRODUCTIVE ORGANS: Unremarkable. BOWEL: There is diffuse wall thickening of the ascending and transverse colon which may be related to patient's cirrhosis. No evidence of bowel structure. There is diffuse wall thickening of the small bowel. VASCULAR/NODES: No aortic or iliac artery aneurysm. No lymphadenopathy. Perigastric and paraesophageal varices are noted. BONES/SUBCUTANEOUS TISSUE: Mild bilateral gynecomastia. IMPRESSION: 1. Macronodular cirrhosis with sequela of portal hypertension with mild splenomegaly and with perigastric and periesophageal varices evident. Large volume of intra-abdominal ascites. 2. Moderately sized left-sided pleural effusion. 3. 1.9 cm intermediate density lesion of the left kidney which is exactly characterize recommend follow-up with MRI or renal mass CT. 4. Diffuse wall thickening of the ascending and transverse colon and mild wall thickening of the small bowel favored related to fluid status and cirrhosis. No obvious evidence for surgical emergency regarding the abdomen.   Will discuss with hospitalist.

## 2019-03-26 NOTE — ED TRIAGE NOTES
Had a paracentesis yesterday. Is having abdominal cramping and nausea.   Has had multiple paracentesis in the past.

## 2019-03-27 NOTE — PROGRESS NOTES
Primary Nurse Arabella Castro RN and Tahmina Porter RN performed a dual skin assessment on this patient has distended abd.

## 2019-03-27 NOTE — PROGRESS NOTES
Patient identified as High Risk for Readmission RRAT 24 - admitted yesterday/3/26/2019 for Hyponatremia Hospitalized December, 2018 for same diagnosis Medical History includes Alcoholic cirrhosis  - with ascites. Current active problems: 
 Spontaneous bacterial peritonitis NATE Plan - follow with IP Case Management Discharge plan may be home (no services) - will support there if appropriate

## 2019-03-27 NOTE — PROGRESS NOTES
TRANSFER - IN REPORT: 
 
Verbal report received from Eden  on Yari Elders  being received from ER for routine progression of care Report consisted of patients Situation, Background, Assessment and  
Recommendations(SBAR). Information from the following report(s) Kardex was reviewed with the receiving nurse. Opportunity for questions and clarification was provided. Assessment completed upon patients arrival to unit and care assumed.

## 2019-03-27 NOTE — PROGRESS NOTES
Critical Results Sodium 119 Potassium 6.1. Dr. Pooja Cruz paged, with call back, no orders at this time.

## 2019-03-27 NOTE — PROGRESS NOTES
Medical team feels that pt may benefit from STR at [facility]. CM will meet with pt's spouse once she returns to hospital.  She is driving at the present time and cannot talk. CM will follow up later today.

## 2019-03-27 NOTE — PROGRESS NOTES
PT note: 
 
Chart reviewed and attempted PT evaluation. Pt lethargic and does not waken to PT voice or touch. Wife at bedside requesting to let pt rest at this time. Will check back later time/date as schedule allows. Thanks, Aishwarya Chan, ANTONIOT

## 2019-03-27 NOTE — ED NOTES
TRANSFER - OUT REPORT: 
 
Verbal report given to Jeremy(name) on Darin Aguayo  being transferred to [facility](unit) for routine progression of care Report consisted of patients Situation, Background, Assessment and  
Recommendations(SBAR). Information from the following report(s) SBAR was reviewed with the receiving nurse. Lines:  
Peripheral IV 03/26/19 Left Antecubital (Active) Site Assessment Clean, dry, & intact 3/26/2019  8:13 PM  
Phlebitis Assessment 0 3/26/2019  8:13 PM  
Infiltration Assessment 0 3/26/2019  8:13 PM  
Dressing Status Clean, dry, & intact 3/26/2019  8:13 PM  
  
 
Opportunity for questions and clarification was provided. Patient transported with: 
 [employer]ure

## 2019-03-27 NOTE — H&P
HOSPITALIST INITIAL HISTORY AND PHYSICAL 
NAME:  Gelacio Burn Age:  72 y.o. 
:   1945 MRN:   [id number] PCP: Nelson Oropeza MD 
Consulting MD: Treatment Team: Attending Provider: Slade Bahena MD 
 
CHIEF COMPLAINT: abd pain, nausea, vomiting HISTORY OF PRESENT ILLNESS:  
Gelacio Patel is a 72 y.o. male with a past medical history of alcoholic cirrhosis who presents to the ER with complaint of nausea, vomiting, and abdominal pain starting yesterday afternoon after his paracentesis. He reports having a paracentesis performed with 6L fluid removed. He also reports getting \"4 bottles\" of albumin IV. He reports that he felt well prior to this. Throughout the evening he began getting more abdominal sharp pain \"like a knife. \" He also admits to chills and subjective fevers last night. This morning, he felt very weak and could not stand up without help. Reports that he feels much better today after receiving IVF in ER. REVIEW OF SYSTEMS: Comprehensive ROS performed and negative except as stated in HPI. Past Medical History:  
Diagnosis Date  Bronchitis  Cirrhosis of liver with ascites ([address]  Hypertension  Ulcer of ileum   
 ulcer Past Surgical History:  
Procedure Laterality Date  HX APPENDECTOMY  HX ORTHOPAEDIC    
 carpal tunnel  HX ORTHOPAEDIC    
 back surgery Prior to Admission Medications Prescriptions Last Dose Informant Patient Reported? Taking?  
ferrous sulfate (IRON) 325 mg (65 mg iron) tablet   Yes No  
Sig: Take 27 mg by mouth Daily (before breakfast). furosemide (LASIX) 80 mg tablet   No No  
Sig: Take 1 Tab by mouth daily. gabapentin (NEURONTIN) 400 mg capsule   No No  
Sig: Take 1 Cap by mouth three (3) times daily. Patient taking differently: Take 400 mg by mouth three (3) times daily. magnesium oxide (MAG-OX) 400 mg tablet   No No  
Sig: Take 1 Tab by mouth daily. Facility-Administered Medications: None Allergies Allergen Reactions  Hydromet [Hydrocodone-Homatropine] Nausea Only Dry heaves FAMILY HISTORY: Reviewed. Negative except Family History Problem Relation Age of Onset  COPD Mother  Heart Attack Father 52 Social History Tobacco Use  Smoking status: Never Smoker  Smokeless tobacco: Never Used Substance Use Topics  Alcohol use: No  
  Comment: None since 2018 Objective:  
 
Visit Vitals /78 (BP 1 Location: Right arm, BP Patient Position: Head of bed elevated (Comment degrees); At rest) Pulse 78 Temp 98.6 °F (37 °C) Resp 20 Ht 5' 9\" (1.753 m) Wt 87.1 kg (192 lb) SpO2 93% BMI 28.35 kg/m² Temp (24hrs), Av.3 °F (36.8 °C), Min:98 °F (36.7 °C), Max:98.6 °F (37 °C) Oxygen Therapy O2 Sat (%): 93 % (19) Pulse via Oximetry: 88 beats per minute (19) O2 Device: Room air (19) Physical Exam: 
General:    The patient is a very pleasant elderly male in no acute distress. Head:   Normocephalic/atraumatic. Eyes:  No palpebral pallor or scleral icterus. ENT:  External auricular and nasal exam within normal limits. Mucous membranes are moist. 
Neck:  Supple, non-tender, no JVD. Lungs:   Clear to auscultation bilaterally without wheezes or crackles. No respiratory distress or accessory muscle use. Heart:   Regular rate and rhythm, without murmurs, rubs, or gallops. Abdomen:   Soft, mildly TTP throughout, mildly distended with normoactive bowel sounds. Genitourinary: No tenderness over the bladder or bilateral CVAs. Extremities: Without clubbing, cyanosis. 1+ BLE pitting edema up to mid thigh Skin:     Normal color, texture, and turgor. No rashes, lesions, or jaundice. Pulses: Radial and dorsalis pedis pulses present 2+ bilaterally. Capillary refill <2s. Neurologic: CN II-XII grossly intact and symmetrical.  
  Moving all four extremities well with no focal deficits. Psychiatric: Pleasant demeanor, appropriate affect. Alert and oriented x 3 Data Review:  
Recent Results (from the past 24 hour(s)) PROTHROMBIN TIME + INR Collection Time: 03/26/19  2:44 PM  
Result Value Ref Range Prothrombin time 16.5 (H) 11.7 - 14.5 sec INR 1.4 PTT Collection Time: 03/26/19  2:44 PM  
Result Value Ref Range aPTT 37.0 24.7 - 39.8 SEC  
CBC WITH AUTOMATED DIFF Collection Time: 03/26/19  2:44 PM  
Result Value Ref Range WBC 15.5 (H) 4.3 - 11.1 K/uL  
 RBC 3.74 (L) 4.23 - 5.6 M/uL  
 HGB 11.2 (L) 13.6 - 17.2 g/dL HCT 32.6 (L) 41.1 - 50.3 % MCV 87.2 79.6 - 97.8 FL  
 MCH 29.9 26.1 - 32.9 PG  
 MCHC 34.4 31.4 - 35.0 g/dL  
 RDW 15.7 (H) 11.9 - 14.6 % PLATELET 677 873 - 702 K/uL MPV 10.4 9.4 - 12.3 FL ABSOLUTE NRBC 0.00 0.0 - 0.2 K/uL  
 DF AUTOMATED NEUTROPHILS 83 (H) 43 - 78 % LYMPHOCYTES 4 (L) 13 - 44 % MONOCYTES 12 4.0 - 12.0 % EOSINOPHILS 0 (L) 0.5 - 7.8 % BASOPHILS 0 0.0 - 2.0 % IMMATURE GRANULOCYTES 1 0.0 - 5.0 %  
 ABS. NEUTROPHILS 12.8 (H) 1.7 - 8.2 K/UL  
 ABS. LYMPHOCYTES 0.6 0.5 - 4.6 K/UL  
 ABS. MONOCYTES 1.9 (H) 0.1 - 1.3 K/UL  
 ABS. EOSINOPHILS 0.0 0.0 - 0.8 K/UL  
 ABS. BASOPHILS 0.0 0.0 - 0.2 K/UL  
 ABS. IMM. GRANS. 0.2 0.0 - 0.5 K/UL METABOLIC PANEL, COMPREHENSIVE Collection Time: 03/26/19  2:44 PM  
Result Value Ref Range Sodium 118 (LL) 136 - 145 mmol/L Potassium 6.0 (H) 3.5 - 5.1 mmol/L Chloride 86 (L) 98 - 107 mmol/L  
 CO2 24 21 - 32 mmol/L Anion gap 8 7 - 16 mmol/L Glucose 150 (H) 65 - 100 mg/dL BUN 28 (H) 8 - 23 MG/DL Creatinine 1.87 (H) 0.8 - 1.5 MG/DL  
 GFR est AA 46 (L) >60 ml/min/1.73m2 GFR est non-AA 38 (L) >60 ml/min/1.73m2 Calcium 8.7 8.3 - 10.4 MG/DL Bilirubin, total 1.8 (H) 0.2 - 1.1 MG/DL  
 ALT (SGPT) 31 12 - 65 U/L  
 AST (SGOT) 56 (H) 15 - 37 U/L Alk. phosphatase 105 50 - 136 U/L Protein, total 6.1 (L) 6.3 - 8.2 g/dL Albumin 2.7 (L) 3.2 - 4.6 g/dL Globulin 3.4 2.3 - 3.5 g/dL A-G Ratio 0.8 (L) 1.2 - 3.5 LIPASE Collection Time: 03/26/19  2:44 PM  
Result Value Ref Range Lipase 253 73 - 393 U/L Imaging Migue Serve /Studies: Xr Chest Pa Lat Result Date: 3/26/2019 IMPRESSION: 1. Mild left basilar atelectasis without a focal consolidation to suggest pneumonia. Ct Abd Pelv Wo Cont Result Date: 3/26/2019 IMPRESSION: 1. Macronodular cirrhosis with sequela of portal hypertension with mild splenomegaly and with perigastric and periesophageal varices evident. Large volume of intra-abdominal ascites. 2. Moderately sized left-sided pleural effusion. 3. 1.9 cm intermediate density lesion of the left kidney which is exactly characterize recommend follow-up with MRI or renal mass CT. 4. Diffuse wall thickening of the ascending and transverse colon and mild wall thickening of the small bowel favored related to fluid status and cirrhosis. Assessment and Plan:  
 
Principal Problem: Hyponatremia (12/28/2018) Likely 2/2 NATE and fluid shift 2/2 paracentesis. IV NS, IV albumin. Follow sodium q8h. Attempt no greater than 12 mmol/L Increase over next 24 h. Active Problems: 
  SBP (spontaneous bacterial peritonitis) ([address] (3/26/2019) Possible, given pain, leukocytosis, nausea, vomiting, subjective chills. IV Cefepime. Ascitic fluid gram stain/culture initially positive for GPC. Likely contaminant. Follow cultures. Nausea & vomiting (3/26/2019) Treat symptomatically. Hyperkalemia (3/26/2019) Moderate. IVF, follow BMP. ANTE (acute kidney injury) ([address] (3/26/2019) IVF, IV albumin, follow BMP. Alcoholic cirrhosis of liver with ascites ([address] (10/8/2018) Per above. Leukocytosis (3/26/2019) Per above. Esophageal varices without bleeding ([address] (10/8/2018) Stable. Normocytic anemia (3/26/2019) Stable. Essential hypertension (10/30/2018) Stable DVT Prophylaxis: Heparin Code Status: FULL CODE Disposition: Admit to med/surg for evaluation and treatment as per above. Anticipated discharge: 2-3 days Signed By: Nathanael Celis MD   
 March 26, 2019

## 2019-03-27 NOTE — PROGRESS NOTES
Hospitalist Progress Note 3/27/2019 Admit Date: 3/26/2019  4:21 PM  
NAME: Gelacio Patel :  1945 MRN:  [id number] Attending: Slade Bahena MD 
PCP:  Nelson Oropeza MD 
 
SUBJECTIVE:  
 
Gelacio Patel is a 72years old male with pmhx of alcoholic cirrhosis requiring frequent paracentesis admitted on 3/26 for nausea/vomiting and abdominal pain. He was also found to have hypervolemic hypo-osmolar hyponatremia 119 and mild hyperkalemia. Pt had paracentesis done on 3/25, and received post paracentesis IV albumin. Pt was empirically started on IV rocephin. Fluid culture from 3/25 showed GPC and moderate streptococcus (beta hemolytic). 3/27: 
Pt seen at bedside. He appears lethargic, but easy to arouse. He denies any further abdominal pain, nausea/vomiting, fever, chills, diarrhea. Pt's wife present at bedside as well. I have discussed about goals of care. Pt's wife reported that pt has advance directives, and is DNR/DNI. Review of Systems negative with exception of pertinent positives noted above PHYSICAL EXAM  
 
 
Visit Vitals /67 (BP 1 Location: Right arm, BP Patient Position: Head of bed elevated (Comment degrees); At rest) Pulse 85 Temp 98.3 °F (36.8 °C) Resp 19 Ht 5' 9\" (1.753 m) Wt 87.1 kg (192 lb) SpO2 94% BMI 28.35 kg/m² Temp (24hrs), Av.1 °F (36.7 °C), Min:97.5 °F (36.4 °C), Max:98.6 °F (37 °C) Oxygen Therapy O2 Sat (%): 94 % (19 0320) Pulse via Oximetry: 88 beats per minute (19) O2 Device: Room air (19) No intake or output data in the 24 hours ending 19 0732 General: Lethargic, frail, NAD Head:  Atraumatic Normocephalic. Eyes:  PERRLA, EOMI, Anicteric. ENT:  No discharges/lesions. Lungs:  Bibasilar crackles, no wheezing CVS:  Regular rate and rhythm,  No murmur, rub, or gallop, No JVD, 2+ pitting LE edema Abdomen: Tense, distended, non tender, fluid thrill and shifting dullness ++ , BS normoactive MSK:  No deformities, lesions, Spontaneously moves extremities. Neurologic:  GCS 15, no motor or sensory deficits, CN 2-12 intact Psychiatry:      No anxiety/Depression Skin:   No rash/lesions. Good skin turgor Heme/Lymph/Immune:  No petechiae, ecchymoses, overt signs of bleeding or    lymphadenopathy noted. Recent Results (from the past 24 hour(s)) PROTHROMBIN TIME + INR Collection Time: 03/26/19  2:44 PM  
Result Value Ref Range Prothrombin time 16.5 (H) 11.7 - 14.5 sec INR 1.4 PTT Collection Time: 03/26/19  2:44 PM  
Result Value Ref Range aPTT 37.0 24.7 - 39.8 SEC  
CBC WITH AUTOMATED DIFF Collection Time: 03/26/19  2:44 PM  
Result Value Ref Range WBC 15.5 (H) 4.3 - 11.1 K/uL  
 RBC 3.74 (L) 4.23 - 5.6 M/uL  
 HGB 11.2 (L) 13.6 - 17.2 g/dL HCT 32.6 (L) 41.1 - 50.3 % MCV 87.2 79.6 - 97.8 FL  
 MCH 29.9 26.1 - 32.9 PG  
 MCHC 34.4 31.4 - 35.0 g/dL  
 RDW 15.7 (H) 11.9 - 14.6 % PLATELET 063 424 - 874 K/uL MPV 10.4 9.4 - 12.3 FL ABSOLUTE NRBC 0.00 0.0 - 0.2 K/uL  
 DF AUTOMATED NEUTROPHILS 83 (H) 43 - 78 % LYMPHOCYTES 4 (L) 13 - 44 % MONOCYTES 12 4.0 - 12.0 % EOSINOPHILS 0 (L) 0.5 - 7.8 % BASOPHILS 0 0.0 - 2.0 % IMMATURE GRANULOCYTES 1 0.0 - 5.0 %  
 ABS. NEUTROPHILS 12.8 (H) 1.7 - 8.2 K/UL  
 ABS. LYMPHOCYTES 0.6 0.5 - 4.6 K/UL  
 ABS. MONOCYTES 1.9 (H) 0.1 - 1.3 K/UL  
 ABS. EOSINOPHILS 0.0 0.0 - 0.8 K/UL  
 ABS. BASOPHILS 0.0 0.0 - 0.2 K/UL  
 ABS. IMM. GRANS. 0.2 0.0 - 0.5 K/UL METABOLIC PANEL, COMPREHENSIVE Collection Time: 03/26/19  2:44 PM  
Result Value Ref Range Sodium 118 (LL) 136 - 145 mmol/L Potassium 6.0 (H) 3.5 - 5.1 mmol/L Chloride 86 (L) 98 - 107 mmol/L  
 CO2 24 21 - 32 mmol/L Anion gap 8 7 - 16 mmol/L Glucose 150 (H) 65 - 100 mg/dL BUN 28 (H) 8 - 23 MG/DL Creatinine 1.87 (H) 0.8 - 1.5 MG/DL  
 GFR est AA 46 (L) >60 ml/min/1.73m2 GFR est non-AA 38 (L) >60 ml/min/1.73m2 Calcium 8.7 8.3 - 10.4 MG/DL Bilirubin, total 1.8 (H) 0.2 - 1.1 MG/DL  
 ALT (SGPT) 31 12 - 65 U/L  
 AST (SGOT) 56 (H) 15 - 37 U/L Alk. phosphatase 105 50 - 136 U/L Protein, total 6.1 (L) 6.3 - 8.2 g/dL Albumin 2.7 (L) 3.2 - 4.6 g/dL Globulin 3.4 2.3 - 3.5 g/dL A-G Ratio 0.8 (L) 1.2 - 3.5 LIPASE Collection Time: 03/26/19  2:44 PM  
Result Value Ref Range Lipase 253 73 - 393 U/L  
AMMONIA Collection Time: 03/26/19 10:00 PM  
Result Value Ref Range Ammonia 23 11 - 32 UMOL/L  
METABOLIC PANEL, COMPREHENSIVE Collection Time: 03/26/19 10:00 PM  
Result Value Ref Range Sodium 119 (LL) 136 - 145 mmol/L Potassium 6.1 (HH) 3.5 - 5.1 mmol/L Chloride 88 (L) 98 - 107 mmol/L  
 CO2 24 21 - 32 mmol/L Anion gap 7 7 - 16 mmol/L Glucose 102 (H) 65 - 100 mg/dL BUN 29 (H) 8 - 23 MG/DL Creatinine 1.74 (H) 0.8 - 1.5 MG/DL  
 GFR est AA 50 (L) >60 ml/min/1.73m2 GFR est non-AA 41 (L) >60 ml/min/1.73m2 Calcium 8.6 8.3 - 10.4 MG/DL Bilirubin, total 1.9 (H) 0.2 - 1.1 MG/DL  
 ALT (SGPT) 34 12 - 65 U/L  
 AST (SGOT) 68 (H) 15 - 37 U/L Alk. phosphatase 108 50 - 136 U/L Protein, total 6.1 (L) 6.3 - 8.2 g/dL Albumin 2.7 (L) 3.2 - 4.6 g/dL Globulin 3.4 2.3 - 3.5 g/dL A-G Ratio 0.8 (L) 1.2 - 3.5    
CBC WITH AUTOMATED DIFF Collection Time: 03/26/19 10:00 PM  
Result Value Ref Range WBC 14.2 (H) 4.3 - 11.1 K/uL  
 RBC 3.82 (L) 4.23 - 5.6 M/uL  
 HGB 11.4 (L) 13.6 - 17.2 g/dL HCT 33.1 (L) 41.1 - 50.3 % MCV 86.6 79.6 - 97.8 FL  
 MCH 29.8 26.1 - 32.9 PG  
 MCHC 34.4 31.4 - 35.0 g/dL  
 RDW 15.7 (H) 11.9 - 14.6 % PLATELET 601 161 - 729 K/uL MPV 10.3 9.4 - 12.3 FL ABSOLUTE NRBC 0.00 0.0 - 0.2 K/uL  
 DF AUTOMATED NEUTROPHILS 79 (H) 43 - 78 % LYMPHOCYTES 4 (L) 13 - 44 % MONOCYTES 15 (H) 4.0 - 12.0 % EOSINOPHILS 0 (L) 0.5 - 7.8 % BASOPHILS 0 0.0 - 2.0 % IMMATURE GRANULOCYTES 1 0.0 - 5.0 % ABS. NEUTROPHILS 11.2 (H) 1.7 - 8.2 K/UL  
 ABS. LYMPHOCYTES 0.6 0.5 - 4.6 K/UL  
 ABS. MONOCYTES 2.2 (H) 0.1 - 1.3 K/UL  
 ABS. EOSINOPHILS 0.0 0.0 - 0.8 K/UL  
 ABS. BASOPHILS 0.0 0.0 - 0.2 K/UL  
 ABS. IMM. GRANS. 0.2 0.0 - 0.5 K/UL  
CBC WITH AUTOMATED DIFF Collection Time: 03/27/19  6:24 AM  
Result Value Ref Range WBC 11.5 (H) 4.3 - 11.1 K/uL  
 RBC 3.25 (L) 4.23 - 5.6 M/uL HGB 9.7 (L) 13.6 - 17.2 g/dL HCT 28.3 (L) 41.1 - 50.3 % MCV 87.1 79.6 - 97.8 FL  
 MCH 29.8 26.1 - 32.9 PG  
 MCHC 34.3 31.4 - 35.0 g/dL  
 RDW 15.8 (H) 11.9 - 14.6 % PLATELET 018 069 - 141 K/uL MPV 10.6 9.4 - 12.3 FL ABSOLUTE NRBC 0.00 0.0 - 0.2 K/uL  
 DF AUTOMATED NEUTROPHILS 74 43 - 78 % LYMPHOCYTES 5 (L) 13 - 44 % MONOCYTES 19 (H) 4.0 - 12.0 % EOSINOPHILS 1 0.5 - 7.8 % BASOPHILS 0 0.0 - 2.0 % IMMATURE GRANULOCYTES 1 0.0 - 5.0 %  
 ABS. NEUTROPHILS 8.5 (H) 1.7 - 8.2 K/UL  
 ABS. LYMPHOCYTES 0.6 0.5 - 4.6 K/UL  
 ABS. MONOCYTES 2.2 (H) 0.1 - 1.3 K/UL  
 ABS. EOSINOPHILS 0.1 0.0 - 0.8 K/UL  
 ABS. BASOPHILS 0.0 0.0 - 0.2 K/UL  
 ABS. IMM. GRANS. 0.1 0.0 - 0.5 K/UL Imaging Dahlia Coffee Len Barley All diagnostic imaging personally reviewed by me. CTAP: 
IMPRESSION: 
1. Macronodular cirrhosis with sequela of portal hypertension with mild 
splenomegaly and with perigastric and periesophageal varices evident. Large 
volume of intra-abdominal ascites. 2. Moderately sized left-sided pleural effusion. 3. 1.9 cm intermediate density lesion of the left kidney which is exactly 
characterize recommend follow-up with MRI or renal mass CT. 4. Diffuse wall thickening of the ascending and transverse colon and mild wall 
thickening of the small bowel favored related to fluid status and cirrhosis EXAM: 2 view chest radiograph. ADDITIONAL CLINICAL INFORMATION: 72year-old cough COMPARISON: Chest radiograph dated December 28, 2018. 
  
FINDINGS: 
 Mild left basilar atelectasis. No pneumothorax. No pleural effusion. The heart, 
mediastinum, john, and pulmonary vasculature are within normal limits. No 
evidence of acute osseous abnormality. 
  
IMPRESSION IMPRESSION:  
1. Mild left basilar atelectasis without a focal consolidation to suggest 
pneumonia. 
  
ASSESSMENT Hospital Problems as of 3/27/2019 Date Reviewed: 2/26/2019 Codes Class Noted - Resolved POA Leukocytosis ICD-10-CM: A16.411 ICD-9-CM: 288.60  3/26/2019 - Present Yes  
   
 SBP (spontaneous bacterial peritonitis) ([facility] ICD-10-CM: F48.6 ICD-9-CM: 567.23  3/26/2019 - Present Yes Nausea & vomiting ICD-10-CM: R11.2 ICD-9-CM: 787.01  3/26/2019 - Present Yes Hyperkalemia ICD-10-CM: E87.5 ICD-9-CM: 276.7  3/26/2019 - Present Yes NATE (acute kidney injury) ([facility] ICD-10-CM: N17.9 ICD-9-CM: 584.9  3/26/2019 - Present Yes Normocytic anemia ICD-10-CM: D64.9 ICD-9-CM: 285.9  3/26/2019 - Present Yes * (Principal) Hyponatremia ICD-10-CM: E87.1 ICD-9-CM: 276.1  12/28/2018 - Present Yes Essential hypertension ICD-10-CM: I10 
ICD-9-CM: 401.9  10/30/2018 - Present Yes Alcoholic cirrhosis of liver with ascites ([facility] ICD-10-CM: K70.31 ICD-9-CM: 571.2  10/8/2018 - Present Yes Esophageal varices without bleeding ([facility]) ICD-10-CM: I85.00 ICD-9-CM: 456.1  10/8/2018 - Present Yes Plan: 
- f/u with ascitic fluid culture and blood culture 
- continue empiric IV cefepime.  
- ascitic fluid chemistry with Polys < 128 
- continue IV lasix 40 mg BID with IV albumin 
- holding aldactone in view of hyperkalemia 
- continue salt tabs - pt has hypervolemic hypo-osmolar hyponatremia due to underlying cirrhosis - plan for TIPS as outpatient by Dr. Sebas Yap for recurrent ascites 
- lactulose to prevent constipation 
- monitor BMP q8h 
- continue other home meds as reconciled in [facility] 
- PT/OT eval  
 
 
DVT Prophylaxis: DNR/DNI Dispo: pending clinical course Buster Casillas MD

## 2019-03-27 NOTE — PROGRESS NOTES
Interdisciplinary Rounds completed 03/27/19. Nursing, Case Management, Physician and PT present. Plan of care reviewed and updated. STR at discharge. Up with assist of 2 to br.

## 2019-03-27 NOTE — PROGRESS NOTES
. 
[facility] Progress Note Date: 3/27/2019 GI Problem: Cirrhosis and ascites History of Present Illness:  Patient is a 72 y.o. male with PMH Etoh cirrhosis and ascites who is seen in consultation for abdominal pain and n/v after a 6.5 L paracentesis. His ascites WBC and neutrophils did not reveal SBP. The culture is growing a Strep. He feels a lot better today besides his massive abdominal distention. He is weak to point where cannot walk. His mental status is good. His outpt hepatologist Dr. Herbert Hernandez is considering TIPS. His Na is 120, K is 5.6, and cre 1.62. He has +2 edema in legs. He was on lasix as outpt but not aldactone. US and CT show cirrhosis and large volume ascites. Hospital Medications: 
Current Facility-Administered Medications Medication Dose Route Frequency  furosemide (LASIX) injection 40 mg  40 mg IntraVENous BID  sodium chloride tablet 1 g  1 g Oral TID WITH MEALS  
 albumin human 25% (BUMINATE) solution 12.5 g  12.5 g IntraVENous Q6H  
 lactulose (CHRONULAC) solution 30 g  30 g Oral TID  heparin (porcine) injection 5,000 Units  5,000 Units SubCUTAneous Q12H  ferrous sulfate tablet 325 mg  325 mg Oral ACB  gabapentin (NEURONTIN) capsule 400 mg  400 mg Oral TID  magnesium oxide (MAG-OX) tablet 400 mg  400 mg Oral DAILY  sodium chloride (NS) flush 5-40 mL  5-40 mL IntraVENous Q8H  
 sodium chloride (NS) flush 5-40 mL  5-40 mL IntraVENous PRN  
 acetaminophen (TYLENOL) tablet 650 mg  650 mg Oral Q4H PRN  
 ondansetron (ZOFRAN) injection 4 mg  4 mg IntraVENous Q4H PRN  
 magnesium hydroxide (MILK OF MAGNESIA) 400 mg/5 mL oral suspension 30 mL  30 mL Oral DAILY PRN  
 cefepime (MAXIPIME) 2 g in 0.9% sodium chloride (MBP/ADV) 100 mL  2 g IntraVENous Q24H  
 zolpidem (AMBIEN) tablet 5 mg  5 mg Oral QHS PRN Objective:  
 
Physical Exam: 
Vitals: 
Visit Vitals /74 (BP 1 Location: Left arm, BP Patient Position: At rest) Pulse 87 Temp 98.9 °F (37.2 °C) Resp 19 Ht 5' 9\" (1.753 m) Wt 96.6 kg (213 lb) SpO2 91% BMI 31.45 kg/m² General: No acute distress. Skin:  Extremities and face reveal no rashes. No russ erythema. No telangiectasias on the chest wall. HEENT: Sclerae anicteric. No oral ulcers. No abnormal pigmentation of the lips. The neck is supple. Cardiovascular: Regular rate and rhythm. No murmurs, gallops, or rubs. Respiratory:  Comfortable breathing  With no accessory muscle use. Clear breath sounds with no wheezes, rales, or rhonchi. GI:  Abdomen nondistended, soft, and nontender. Normal active bowel sounds. No enlargement of the liver or spleen. No masses palpable. Musculoskeletal:  No pitting edema of the lower legs. Extremities have good range of motion. Neurological:  Gross memory appears intact. Patient is alert and oriented. Psychiatric:  Mood appears appropriate with judgement intact. Lymphatic:  No cervical or supraclavicular adenopathy. Laboratory:   
Recent Labs  
  03/27/19 
1801 WBC 11.5*  
RBC 3.25* HGB 9.7* HCT 28.3*  
 Recent Labs  
  03/27/19 
4084 GLU 84 *  
K 5.6*  
CL 89* CO2 25 BUN 30* CREA 1.62* CA 8.5 Recent Labs  
  03/26/19 
1444 PTP 16.5* INR 1.4 APTT 37.0 Recent Labs  
  03/27/19 
0624  03/26/19 
1444 SGOT 58*   < > 56* AP 86   < > 105 ALB 2.9*   < > 2.7* TP 5.7*   < > 6.1*  
LPSE  --   --  253  
 < > = values in this interval not displayed. Assessment: A 72 y.o. male with Etoh cirrhosis here with ascites, hyponatremia, hyperkalemia, and acute renal insufficiency. Plan: 
  
 
Correct electrolytes Agree with cefepime for now Agree with albumin Agree with lasix (and no aldactone) Can re-paracentese when electrolytes normalize Would give 100 g albumin with next paracentesis Vit K 10 mg PO 
 
 
 Signed By: Scott Baldwin MD   
 March 27, 2019

## 2019-03-28 NOTE — PROGRESS NOTES
Hospitalist Progress Note 3/28/2019 Admit Date: 3/26/2019  4:21 PM  
NAME: Wily Wilder :  1945 MRN:  [id number] Attending: Gustavo Alonso MD 
PCP:  Romulo Gasca MD 
 
SUBJECTIVE:  
 
Wily Wilder is a 72years old male with pmhx of alcoholic cirrhosis requiring frequent paracentesis admitted on 3/26 for nausea/vomiting and abdominal pain. He was also found to have hypervolemic hypo-osmolar hyponatremia 119 and mild hyperkalemia. Pt had paracentesis done on 3/25, and received post paracentesis IV albumin. Pt was empirically started on IV rocephin. Fluid culture from 3/25 showed GPC and moderate streptococcus (beta hemolytic). 3/28: 
Pt seen. He reports feeling much better today, but continues to have significant generalized weakness and difficulty in walking. Pt's wife reports that pt almost fell this AM while using the bathroom by himself. No fall, head trauma. Pt still reports abdominal distention and pedal edema. No abdominal pain, fever, chills, nausea/vomiting Appetite is poor Review of Systems negative with exception of pertinent positives noted above PHYSICAL EXAM  
 
 
Visit Vitals /69 (BP 1 Location: Left arm, BP Patient Position: At rest) Pulse 80 Temp 97.5 °F (36.4 °C) Resp 18 Ht 5' 9\" (1.753 m) Wt 96.6 kg (213 lb) SpO2 95% BMI 31.45 kg/m² Temp (24hrs), Av °F (36.7 °C), Min:97.5 °F (36.4 °C), Max:98.5 °F (36.9 °C) Oxygen Therapy O2 Sat (%): 95 % (19) Pulse via Oximetry: 88 beats per minute (19) O2 Device: Room air (19) No intake or output data in the 24 hours ending 19 09 General: Alert, awake, NAD Head:  Atraumatic Normocephalic. Eyes:  PERRLA, EOMI, Anicteric. ENT:  No discharges/lesions. Lungs:  Bibasilar crackles, no wheezing CVS:  Regular rate and rhythm,  No murmur, rub, or gallop, No JVD, 1+ pitting LE edema Abdomen: Tense, distended, non tender, fluid thrill and shifting dullness ++ , BS normoactive MSK:  No deformities, lesions, Spontaneously moves extremities. Neurologic:  GCS 15, no motor or sensory deficits, CN 2-12 intact Psychiatry:      No anxiety/Depression Skin:   No rash/lesions. Good skin turgor Heme/Lymph/Immune:  No petechiae, ecchymoses, overt signs of bleeding or    lymphadenopathy noted. Recent Results (from the past 24 hour(s)) CULTURE, BLOOD Collection Time: 03/27/19  9:52 AM  
Result Value Ref Range Special Requests: LEFT 
HAND Culture result: NO GROWTH AFTER 20 HOURS    
CULTURE, BLOOD Collection Time: 03/27/19  9:52 AM  
Result Value Ref Range Special Requests: RIGHT 
HAND Culture result: NO GROWTH AFTER 20 HOURS    
LACTIC ACID Collection Time: 03/27/19  9:52 AM  
Result Value Ref Range Lactic acid 1.4 0.4 - 2.0 MMOL/L  
PROCALCITONIN Collection Time: 03/27/19  9:52 AM  
Result Value Ref Range Procalcitonin 1.9 ng/mL AMMONIA Collection Time: 03/27/19  9:52 AM  
Result Value Ref Range Ammonia 89 (H) 11 - 32 UMOL/L  
METABOLIC PANEL, COMPREHENSIVE Collection Time: 03/27/19  1:59 PM  
Result Value Ref Range Sodium 119 (LL) 136 - 145 mmol/L Potassium 5.6 (H) 3.5 - 5.1 mmol/L Chloride 90 (L) 98 - 107 mmol/L  
 CO2 21 21 - 32 mmol/L Anion gap 8 7 - 16 mmol/L Glucose 98 65 - 100 mg/dL BUN 30 (H) 8 - 23 MG/DL Creatinine 1.47 0.8 - 1.5 MG/DL  
 GFR est AA >60 >60 ml/min/1.73m2 GFR est non-AA 50 (L) >60 ml/min/1.73m2 Calcium 8.3 8.3 - 10.4 MG/DL Bilirubin, total 2.2 (H) 0.2 - 1.1 MG/DL  
 ALT (SGPT) 31 12 - 65 U/L  
 AST (SGOT) 63 (H) 15 - 37 U/L Alk. phosphatase 94 50 - 136 U/L Protein, total 5.8 (L) 6.3 - 8.2 g/dL Albumin 3.1 (L) 3.2 - 4.6 g/dL Globulin 2.7 2.3 - 3.5 g/dL A-G Ratio 1.1 (L) 1.2 - 3.5 METABOLIC PANEL, COMPREHENSIVE Collection Time: 03/27/19  9:30 PM  
Result Value Ref Range Sodium 119 (LL) 136 - 145 mmol/L Potassium 5.1 3.5 - 5.1 mmol/L Chloride 88 (L) 98 - 107 mmol/L  
 CO2 23 21 - 32 mmol/L Anion gap 8 7 - 16 mmol/L Glucose 110 (H) 65 - 100 mg/dL BUN 29 (H) 8 - 23 MG/DL Creatinine 1.42 0.8 - 1.5 MG/DL  
 GFR est AA >60 >60 ml/min/1.73m2 GFR est non-AA 52 (L) >60 ml/min/1.73m2 Calcium 8.0 (L) 8.3 - 10.4 MG/DL Bilirubin, total 2.0 (H) 0.2 - 1.1 MG/DL  
 ALT (SGPT) 31 12 - 65 U/L  
 AST (SGOT) 58 (H) 15 - 37 U/L Alk. phosphatase 111 50 - 136 U/L Protein, total 5.8 (L) 6.3 - 8.2 g/dL Albumin 3.0 (L) 3.2 - 4.6 g/dL Globulin 2.8 2.3 - 3.5 g/dL A-G Ratio 1.1 (L) 1.2 - 3.5    
CBC WITH AUTOMATED DIFF Collection Time: 03/28/19  6:39 AM  
Result Value Ref Range WBC 7.9 4.3 - 11.1 K/uL  
 RBC 3.13 (L) 4.23 - 5.6 M/uL HGB 9.4 (L) 13.6 - 17.2 g/dL HCT 27.5 (L) 41.1 - 50.3 % MCV 87.9 79.6 - 97.8 FL  
 MCH 30.0 26.1 - 32.9 PG  
 MCHC 34.2 31.4 - 35.0 g/dL  
 RDW 15.5 (H) 11.9 - 14.6 % PLATELET 213 (L) 108 - 450 K/uL MPV 10.0 9.4 - 12.3 FL ABSOLUTE NRBC 0.00 0.0 - 0.2 K/uL  
 DF AUTOMATED NEUTROPHILS 70 43 - 78 % LYMPHOCYTES 7 (L) 13 - 44 % MONOCYTES 20 (H) 4.0 - 12.0 % EOSINOPHILS 1 0.5 - 7.8 % BASOPHILS 0 0.0 - 2.0 % IMMATURE GRANULOCYTES 2 0.0 - 5.0 %  
 ABS. NEUTROPHILS 5.6 1.7 - 8.2 K/UL  
 ABS. LYMPHOCYTES 0.6 0.5 - 4.6 K/UL  
 ABS. MONOCYTES 1.5 (H) 0.1 - 1.3 K/UL  
 ABS. EOSINOPHILS 0.1 0.0 - 0.8 K/UL  
 ABS. BASOPHILS 0.0 0.0 - 0.2 K/UL  
 ABS. IMM. GRANS. 0.1 0.0 - 0.5 K/UL METABOLIC PANEL, COMPREHENSIVE Collection Time: 03/28/19  6:39 AM  
Result Value Ref Range Sodium 119 (LL) 136 - 145 mmol/L Potassium 4.7 3.5 - 5.1 mmol/L Chloride 89 (L) 98 - 107 mmol/L  
 CO2 24 21 - 32 mmol/L Anion gap 6 (L) 7 - 16 mmol/L Glucose 97 65 - 100 mg/dL BUN 29 (H) 8 - 23 MG/DL Creatinine 1.60 (H) 0.8 - 1.5 MG/DL  
 GFR est AA 55 (L) >60 ml/min/1.73m2 GFR est non-AA 46 (L) >60 ml/min/1.73m2 Calcium 8.4 8.3 - 10.4 MG/DL Bilirubin, total 1.9 (H) 0.2 - 1.1 MG/DL  
 ALT (SGPT) 32 12 - 65 U/L  
 AST (SGOT) 56 (H) 15 - 37 U/L Alk. phosphatase 113 50 - 136 U/L Protein, total 5.8 (L) 6.3 - 8.2 g/dL Albumin 3.1 (L) 3.2 - 4.6 g/dL Globulin 2.7 2.3 - 3.5 g/dL A-G Ratio 1.1 (L) 1.2 - 3.5 PROTHROMBIN TIME + INR Collection Time: 03/28/19  6:39 AM  
Result Value Ref Range Prothrombin time 17.2 (H) 11.7 - 14.5 sec INR 1.4 Imaging Carlyle Suazo Frames All diagnostic imaging personally reviewed by me. CTAP: 
IMPRESSION: 
1. Macronodular cirrhosis with sequela of portal hypertension with mild 
splenomegaly and with perigastric and periesophageal varices evident. Large 
volume of intra-abdominal ascites. 2. Moderately sized left-sided pleural effusion. 3. 1.9 cm intermediate density lesion of the left kidney which is exactly 
characterize recommend follow-up with MRI or renal mass CT. 4. Diffuse wall thickening of the ascending and transverse colon and mild wall 
thickening of the small bowel favored related to fluid status and cirrhosis EXAM: 2 view chest radiograph. ADDITIONAL CLINICAL INFORMATION: 72year-old cough COMPARISON: Chest radiograph dated December 28, 2018. 
  
FINDINGS: 
Mild left basilar atelectasis. No pneumothorax. No pleural effusion. The heart, 
mediastinum, john, and pulmonary vasculature are within normal limits. No 
evidence of acute osseous abnormality. 
  
IMPRESSION IMPRESSION:  
1. Mild left basilar atelectasis without a focal consolidation to suggest 
pneumonia. 
  
ASSESSMENT Hospital Problems as of 3/28/2019 Date Reviewed: 2/26/2019 Codes Class Noted - Resolved POA Leukocytosis ICD-10-CM: H11.209 ICD-9-CM: 288.60  3/26/2019 - Present Yes  
   
 SBP (spontaneous bacterial peritonitis) ([facility] ICD-10-CM: L32.8 ICD-9-CM: 567.23  3/26/2019 - Present Yes Nausea & vomiting ICD-10-CM: R11.2 ICD-9-CM: 787.01  3/26/2019 - Present Yes Hyperkalemia ICD-10-CM: E87.5 ICD-9-CM: 276.7  3/26/2019 - Present Yes NATE (acute kidney injury) ([facility] ICD-10-CM: N17.9 ICD-9-CM: 584.9  3/26/2019 - Present Yes Normocytic anemia ICD-10-CM: D64.9 ICD-9-CM: 285.9  3/26/2019 - Present Yes * (Principal) Hyponatremia ICD-10-CM: E87.1 ICD-9-CM: 276.1  12/28/2018 - Present Yes Essential hypertension ICD-10-CM: I10 
ICD-9-CM: 401.9  10/30/2018 - Present Yes Alcoholic cirrhosis of liver with ascites ([facility] ICD-10-CM: K70.31 ICD-9-CM: 571.2  10/8/2018 - Present Yes Esophageal varices without bleeding ([facility]) ICD-10-CM: I85.00 ICD-9-CM: 456.1  10/8/2018 - Present Yes Plan: 
- ascitic fluid culture positive for Streptococcus Salivarius - IV cefepime switched to IV rocephin 
- ascitic fluid chemistry with Polys < 128 
- Hyponatremia: not improving, 119 this AM. Nephrology consulted for evaluation - pt will need Paracentesis once Na improves 
- continue IV lasix 40 mg BID with IV albumin 
- holding aldactone in view of hyperkalemia - pt has hypervolemic hypo-osmolar hyponatremia due to underlying cirrhosis - plan for TIPS as outpatient by Dr. Uri Pitts for recurrent ascites 
- lactulose to prevent constipation 
- monitor BMP q8h 
- continue other home meds as reconciled in [facility] 
- PT/OT eval  
 
 
DVT Prophylaxis: DNR/DNI Dispo: pending clinical course Pt's family is requesting for 9th floor IRC, will place a consult. CM informed about STR placement.  
 
Melody Styles MD

## 2019-03-28 NOTE — PROGRESS NOTES
Interdisciplinary Rounds completed 03/28/19. Nursing, Case Management, Physician and PT present. Plan of care reviewed and updated. Nephro/Gi consult. [facility] consult. Probable parectasis Monday.

## 2019-03-28 NOTE — PROGRESS NOTES
Problem: Mobility Impaired (Adult and Pediatric) Goal: *Acute Goals and Plan of Care (Insert Text) Description LTG: 
(1.)Mr. Herlinda Weiss will move from supine to sit and sit to supine , scoot up and down and roll side to side with INDEPENDENT within 7 treatment day(s) from flat surface without handrail through log rolling.   
(2.)Mr. Herlinda Weiss will transfer from bed to chair and chair to bed with MODIFIED INDEPENDENCE using the least restrictive device within 7 treatment day(s). (3.)Mr. Herlinda Weiss will ambulate with MODIFIED INDEPENDENCE for 250+ feet with the least restrictive device within 7 treatment day(s) while maintaining normal vital signs. (4.)Mr. Herlinda Weiss will perform 2 steps with SBA within 7 treatment days for safety ascending and descending stairs for home.  
_______________________________________________________________________________________ Outcome: Progressing Towards Goal 
  
PHYSICAL THERAPY: Initial Assessment and AM 3/28/2019 INPATIENT: PT Visit Days : 1 [facility] Plan: [facility] CHOICE / Product Type: Managed Care Medicare /   
  
NAME/AGE/GENDER: Dayne Mireles is a 72 y.o. male PRIMARY DIAGNOSIS: Hyponatremia [E87.1] Hyponatremia Hyponatremia ICD-10: Treatment Diagnosis:  
 Difficulty in walking, Not elsewhere classified (R26.2) Precaution/Allergies: 
Hydromet [hydrocodone-homatropine] ASSESSMENT:  
 
Mr. Herlinda Weiss presents with decreased bed mobility, transfers, ambulation, balance, activity tolerance, strength and overall general functional mobility s/p hospital admission with abdominal cramping, nausea and generalized weakness s/p paracentesis on 3/25/19. Pt A & O x 3 this date, spouse present. Pt reports lives in apartment with 2 steps to enter, has required assist with ADLs from spouse, some sit to stand assist and increase difficulty with ambulation.  Pt normally uses cane for ambulation, recently borrowed RW for increased support. Pt today required MIN A x 2 for sit to stand transfers from elevated bed. Pt required education and instruction in use of RW, posture, fawad, steppage and weight shifting with ambulation. Pt overall Min A with ambulation, very slow fawad, steppage gait. Pt required verbal cues for foot advancement and body placement in walker as well as directional cues with walker. PT to follow for acute care needs to address deficits noted above. Pt would benefit from further skilled PT services at discharge due to functioning well below baseline level of activity, fall risk and safety concerns for home. This section established at most recent assessment PROBLEM LIST (Impairments causing functional limitations): 
Decreased Strength Decreased ADL/Functional Activities Decreased Transfer Abilities Decreased Ambulation Ability/Technique Decreased Balance Increased Pain Decreased Activity Tolerance Increased Fatigue Edema/Girth Decreased [place] with Home Exercise Program 
 INTERVENTIONS PLANNED: (Benefits and precautions of physical therapy have been discussed with the patient.) Balance Exercise Bed Mobility Family Education Gait Training Home Exercise Program (HEP) Therapeutic Activites Therapeutic Exercise/Strengthening Transfer Training TREATMENT PLAN: Frequency/Duration: 3 times a week for duration of hospital stay Rehabilitation Potential For Stated Goals: Good RECOMMENDED REHABILITATION/EQUIPMENT: (at time of discharge pending progress): Due to the probability of continued deficits (see above) this patient will likely need continued skilled physical therapy after discharge. Equipment: To be determined HISTORY:  
History of Present Injury/Illness (Reason for Referral): 
Lei Farmer is a 72 y.o. male with a past medical history of alcoholic cirrhosis who presents to the ER with complaint of nausea, vomiting, and abdominal pain starting yesterday afternoon after his paracentesis. He reports having a paracentesis performed with 6L fluid removed. He also reports getting \"4 bottles\" of albumin IV. He reports that he felt well prior to this. Throughout the evening he began getting more abdominal sharp pain \"like a knife. \" He also admits to chills and subjective fevers last night. This morning, he felt very weak and could not stand up without help. Reports that he feels much better today after receiving IVF in ER. Past Medical History/Comorbidities:  
Mr. Rupert Goodell  has a past medical history of Bronchitis, Cirrhosis of liver with ascites ([address] Hypertension, and Ulcer of ileum. Mr. Rupert Goodell  has a past surgical history that includes hx orthopaedic; hx orthopaedic; and hx appendectomy. Social History/Living Environment:  
Home Environment: Apartment # Steps to Enter: 2 One/Two Story Residence: One story Living Alone: No 
Support Systems: Family member(s) Patient Expects to be Discharged to[de-identified] Rehabilitation facility Current DME Used/Available at Home: Cane, straight, Safety frame toliet, Shower chair, Walker, rolling Tub or Shower Type: Tub/Shower combination Prior Level of Function/Work/Activity: 
Lives with spouse, uses cane for ambulation, has required increased assist ADLs, no recent falls, drives Personal Factors:   
      Sex:  male Age:  72 y.o. Overall Behavior:  agreeable Number of Personal Factors/Comorbidities that affect the Plan of Care: 
Alcoholic cirrhosis, age 2-2: MODERATE COMPLEXITY EXAMINATION:  
Most Recent Physical Functioning:  
Gross Assessment: 
AROM: Generally decreased, functional(B LE) Strength: Generally decreased, functional(B LE grossly 4/5, weaker B hip flex 3/5) Coordination: Generally decreased, functional 
Sensation: Intact(B LE) Posture: 
Posture (WDL): Exceptions to [facility] Posture Assessment: Forward head, Rounded shoulders Balance: Standing: Impaired Standing - Static: Fair Standing - Dynamic : Fair Bed Mobility: 
  
Wheelchair Mobility: 
  
Transfers: 
Sit to Stand: Minimum assistance;Assist x2(bed elevated) Stand to Sit: Minimum assistance Bed to Chair: Minimum assistance;Assist x2 Gait: 
  
Base of Support: Widened Speed/Aleyda: Pace decreased (<100 feet/min) Step Length: Left shortened;Right shortened Swing Pattern: Left asymmetrical;Right asymmetrical 
Gait Abnormalities: Decreased step clearance; Steppage gait;Trunk sway increased Distance (ft): 10 Feet (ft) Assistive Device: Gait belt;Walker, rolling Ambulation - Level of Assistance: Minimal assistance Interventions: Safety awareness training;Verbal cues Body Structures Involved: Muscles Body Functions Affected: Movement Related Activities and Participation Affected: 
General Tasks and Demands Mobility Self Care Number of elements that affect the Plan of Care: 4+: HIGH COMPLEXITY CLINICAL PRESENTATION:  
Presentation: Evolving clinical presentation with changing clinical characteristics: MODERATE COMPLEXITY CLINICAL DECISION MAKIN [facility] [place] [address] [address] AM-PAC? ?6 Clicks? Basic Mobility Inpatient Short Form How much difficulty does the patient currently have. .. Unable A Lot A Little None 1. Turning over in bed (including adjusting bedclothes, sheets and blankets)? ? 1   ? 2   ? 3   ? 4  
2. Sitting down on and standing up from a chair with arms ( e.g., wheelchair, bedside commode, etc.)   ? 1   ? 2   ? 3   ? 4  
3. Moving from lying on back to sitting on the side of the bed?   ? 1   ? 2   ? 3   ? 4 How much help from another person does the patient currently need. .. Total A Lot A Little None 4. Moving to and from a bed to a chair (including a wheelchair)? ? 1   ? 2   ? 3   ? 4  
5. Need to walk in hospital room? ? 1   ? 2   ? 3   ? 4  
6. Climbing 3-5 steps with a railing?    ? 1   ? 2   ? 3   ? 4  
 © 2007, Trustees of [address] [place] [address] [address] [address], under license to [employer]. All rights reserved Score:  Initial: 14 Most Recent: X (Date: -- ) Interpretation of Tool:  Represents activities that are increasingly more difficult (i.e. Bed mobility, Transfers, Gait). Medical Necessity:    
Patient is expected to demonstrate progress in strength, range of motion, balance and coordination 
 to decrease assistance required with overall functional mobility, transfers, ambulation Nancy Gearing Patient demonstrates good 
 rehab potential due to higher previous functional level. Reason for Services/Other Comments: 
Patient continues to require present interventions due to patient's inability to perform bed mobility, transfers, ambulation safely and effectively Nancy Gearing Use of outcome tool(s) and clinical judgement create a POC that gives a: Questionable prediction of patient's progress: MODERATE COMPLEXITY  
  
 
 
 
TREATMENT:  
(In addition to Assessment/Re-Assessment sessions the following treatments were rendered) Pre-treatment Symptoms/Complaints:  \"I have been in the bed for 3 days\" Pain: Initial:  
Pain Intensity 1: 10(has had medication) Pain Location 1: Back Pain Intervention(s) 1: Repositioned  Post Session:  does not rate post mobility, appears comfortable in chair Gait Training ( 8 min):  Gait training to improve and/or restore physical functioning as related to mobility, strength, balance and coordination. Ambulated 10 Feet (ft) with Minimal assistance using a Gait belt;Walker, rolling and minimal Safety awareness training;Verbal cues related to their stance phase, stride length and posture, weight shifting,walker safety  to promote proper body alignment, promote proper body posture and promote proper body mechanics. Instruction in performance of step length, weight shifting, walker placement and body position to correct overall functional mobility . Braces/Orthotics/Lines/Etc:  
O2 Device: Room air Treatment/Session Assessment:   
Response to Treatment:  fair tolerance, fatigues quickly Interdisciplinary Collaboration:  
Physical Therapist 
Occupational Therapist 
Registered Nurse After treatment position/precautions:  
Up in chair Bed/Chair-wheels locked Bed in low position Call light within reach Family at bedside NP at bedside Compliance with Program/Exercises: Will assess as treatment progresses Recommendations/Intent for next treatment session: \"Next visit will focus on advancements to more challenging activities\". Total Treatment Duration: PT Patient Time In/Time Out Time In: 0492 Time Out: 7116 Kalia , PT

## 2019-03-28 NOTE — PROGRESS NOTES
Problem: Self Care Deficits Care Plan (Adult) Goal: *Acute Goals and Plan of Care (Insert Text) Description 1. Patient will complete lower body bathing and dressing with minimal assistance and adaptive equipment as needed. 2. Patient will complete toileting with CGA. 3. Patient will tolerate 25 minutes of OT treatment with 1-2 rest breaks to increase activity tolerance for ADLs. 4. Patient will complete functional transfers with supervision and adaptive equipment as needed. 5. Patient will complete functional mobility for household distances with supervision and appropriate safety awareness. Timeframe: 7 visits Outcome: Progressing Towards Goal 
  
 
OCCUPATIONAL THERAPY: Initial Assessment, Daily Note and AM 3/28/2019 INPATIENT: OT Visit Days: 1 Payor: [employer] / Plan: [place] [employer] / Product Type: [employer] /  
  
NAME/AGE/GENDER: Leonid Corral is a 72 y.o. male PRIMARY DIAGNOSIS:  Hyponatremia [E87.1] Hyponatremia Hyponatremia ICD-10: Treatment Diagnosis:  
 Generalized Muscle Weakness (M62.81) Other lack of cordination (R27.8) Precautions/Allergies: 
   Hydromet [hydrocodone-homatropine] ASSESSMENT:  
Mr. Jasmina Olivares was admitted with hyponatremia. Pt lives with his  in an apartment with a tub/shower and is needing assistance with LB ADL at baseline. Pt was using a cane for functional mobility. Pt has had difficulty with getting in/out of the tub/shower with some near falls. This session, pt presented supine in bed and reports new onset of back pain c/o 10/10 pain. Pt demonstrated deficits in pain, edema, decreased activity tolerance, strength, and impaired balance impacting ADLs. Pt transferred to the edge of the bed with moderate assistance and education of a log roll. Pt tolerated log roll well with cueing for LE and UE positioning  during transfer.  Pt had difficulty scooting to the edge of the bed with pt educated on reciprocal alternating scooting. Pt demonstrates functional but UE strength for assisting with transfers. Pt is limited due to abdominal and LE swelling which makes transfers and LB ADL difficulty. Pt stood to the walker with minimal assistance x 2 and pt overall is moving slower requiring additional time. At the end of the session, pt was left working with PT with needs in reach. Pt presented below functional baseline and would benefit from skilled OT services to address deficits. This section established at most recent assessment PROBLEM LIST (Impairments causing functional limitations): 
Decreased Strength Decreased ADL/Functional Activities Decreased Transfer Abilities Decreased Ambulation Ability/Technique Decreased Balance Increased Pain Decreased Activity Tolerance Increased Fatigue Increased Shortness of Breath Decreased Flexibility/Joint Mobility Edema/Girth Decreased Hunt with Home Exercise Program 
Decreased Cognition INTERVENTIONS PLANNED: (Benefits and precautions of occupational therapy have been discussed with the patient.) Activities of daily living training Adaptive equipment training Balance training Clothing management Cognitive training Donning&doffing training Neuromuscular re-eduation Therapeutic activity Therapeutic exercise TREATMENT PLAN: Frequency/Duration: Follow patient 3 times per week to address above goals. Rehabilitation Potential For Stated Goals: Good RECOMMENDED REHABILITATION/EQUIPMENT: (at time of discharge pending progress): Due to the probability of continued deficits (see above) this patient will likely need continued skilled occupational therapy after discharge. Equipment:  
Discussed with family about tub transfer bench OCCUPATIONAL PROFILE AND HISTORY:  
History of Present Injury/Illness (Reason for Referral): 
See H&P Past Medical History/Comorbidities: Mr. Gordy Posada  has a past medical history of Bronchitis, Cirrhosis of liver with ascites ([address] Hypertension, and Ulcer of ileum. Mr. Gordy Posada  has a past surgical history that includes hx orthopaedic; hx orthopaedic; and hx appendectomy. Social History/Living Environment:  
Home Environment: Apartment # Steps to Enter: 2 One/Two Story Residence: One story Living Alone: No 
Support Systems: Family member(s) Patient Expects to be Discharged to[de-identified] Rehabilitation facility Current DME Used/Available at Home: Cane, straight, Safety frame toliet, Shower chair, Walker, rolling Tub or Shower Type: Tub/Shower combination Prior Level of Function/Work/Activity: 
Pt lives with his  in an apartment with a tub/shower and is needing assistance with LB ADL at baseline. Pt was using a cane for functional mobility. Pt has had difficulty with getting in/out of the tub/shower with some near falls. Personal Factors:   
      Past/Current Experience:  hx of near falls and weakness at home Other factors that influence how disability is experienced by the patient:  multiple co-morbidities Number of Personal Factors/Comorbidities that affect the Plan of Care: Extensive review of physical, cognitive, and psychosocial performance (3+):  HIGH COMPLEXITY ASSESSMENT OF OCCUPATIONAL PERFORMANCE[de-identified]  
Activities of Daily Living:  
Basic ADLs (From Assessment) Complex ADLs (From Assessment) Feeding: Setup Oral Facial Hygiene/Grooming: Stand-by assistance Bathing: Moderate assistance Upper Body Dressing: Minimum assistance Lower Body Dressing: Maximum assistance Toileting: Moderate assistance Instrumental ADL Meal Preparation: Total assistance Homemaking: Total assistance Grooming/Bathing/Dressing Activities of Daily Living Cognitive Retraining Safety/Judgement: Fall prevention Bed/Mat Mobility Supine to Sit: Moderate assistance Sit to Stand: Minimum assistance;Assist x2(bed elevated) Stand to Sit: Minimum assistance Bed to Chair: Minimum assistance;Assist x2 Scooting: Moderate assistance Most Recent Physical Functioning:  
Gross Assessment: 
AROM: Generally decreased, functional 
Strength: Generally decreased, functional 
Coordination: Generally decreased, functional 
Sensation: Intact Posture: 
Posture (WDL): Exceptions to [facility] Posture Assessment: Forward head, Rounded shoulders Balance: 
Sitting: Impaired Sitting - Static: Good (unsupported) Sitting - Dynamic: Prop sitting Standing: Impaired Standing - Static: Fair Standing - Dynamic : Fair Bed Mobility: 
Supine to Sit: Moderate assistance Scooting: Moderate assistance Wheelchair Mobility: 
  
Transfers: 
Sit to Stand: Minimum assistance;Assist x2(bed elevated) Stand to Sit: Minimum assistance Bed to Chair: Minimum assistance;Assist x2 Patient Vitals for the past 6 hrs: 
 BP BP Patient Position SpO2 Pulse 19 0732 104/69 At rest 95 % 80 Mental Status Neurologic State: Alert Orientation Level: Oriented to person, Oriented to place, Oriented to situation Cognition: Appropriate for age attention/concentration, Follows commands Perception: Appears intact Perseveration: No perseveration noted Safety/Judgement: Fall prevention Physical Skills Involved: 
Range of Motion Balance Strength Activity Tolerance Pain (acute) Edema Cognitive Skills Affected (resulting in the inability to perform in a timely and safe manner): Divided Attention Psychosocial Skills Affected: 
Habits/Routines Self-Awareness Number of elements that affect the Plan of Care: 5+:  HIGH COMPLEXITY CLINICAL DECISION MAKIN [facility] [place] [address] [id number] AM-PAC? ?6 Clicks? Daily Activity Inpatient Short Form How much help from another person does the patient currently need. .. Total A Lot A Little None 1. Putting on and taking off regular lower body clothing?    ? 1   ? 2   ? 3   ? 4  
 2.  Bathing (including washing, rinsing, drying)? ? 1   ? 2   ? 3   ? 4  
3. Toileting, which includes using toilet, bedpan or urinal?   ? 1   ? 2   ? 3   ? 4  
4. Putting on and taking off regular upper body clothing? ? 1   ? 2   ? 3   ? 4  
5. Taking care of personal grooming such as brushing teeth? ? 1   ? 2   ? 3   ? 4  
6. Eating meals? ? 1   ? 2   ? 3   ? 4  
© 2007, Trustees of [facility] [employer], under license to [employer]. All rights reserved Score:  Initial: 15 Most Recent: X (Date: -- ) Interpretation of Tool:  Represents activities that are increasingly more difficult (i.e. Bed mobility, Transfers, Gait). Medical Necessity:    
Patient demonstrates good 
 rehab potential due to higher previous functional level. Reason for Services/Other Comments: 
Patient continues to require skilled intervention due to decreased independence with ADL/functional transfers Estephania Po Use of outcome tool(s) and clinical judgement create a POC that gives a: LOW COMPLEXITY  
 
 
 
TREATMENT:  
(In addition to Assessment/Re-Assessment sessions the following treatments were rendered) Pre-treatment Symptoms/Complaints:   
Pain: Initial:  
Pain Intensity 1: 0  Post Session:  0/10 Therapeutic Activity: (    8 minutes): Therapeutic activities including Bed transfers and sit to stand  to improve mobility, strength, balance and coordination. Required moderate Safety awareness training;Verbal cues to promote static and dynamic balance in standing. N/a Braces/Orthotics/Lines/Etc:  
O2 Device: Room air Treatment/Session Assessment:   
Response to Treatment:  Pt tolerated well with good participation. Interdisciplinary Collaboration: Occupational Therapist 
Registered Nurse After treatment position/precautions: Working with PT Compliance with Program/Exercises: Will assess as treatment progresses. Recommendations/Intent for next treatment session:   \"Next visit will focus on advancements to more challenging activities and reduction in assistance provided\". Total Treatment Duration: OT Patient Time In/Time Out Time In: 0230 Time Out: 1894 Vicky Gupta, OT

## 2019-03-28 NOTE — PROGRESS NOTES
Hourly rounding completed. After c/o pain in back as an 8/10, order was received from on call MD for Norco 5-325 x1 dose. After receiving the medication, patient slept soundly for the rest of the night. All needs met at this time.

## 2019-03-28 NOTE — CONSULTS
PM&R Rehab Consult Subjective:  
 
Date of Consultation:  March 28, 2019 Referring Physician: Dr. Joyce Part Specialist; Dr Vickie Powell ; GI 
 
Patient is a 72 y.o. male who is being seen for rehab recommendations secondary to acute on chronic debility related to cirrhosis with hyponatremia, and SBP Principal Problem: Hyponatremia (12/28/2018) Active Problems: 
  Alcoholic cirrhosis of liver with ascites ([address] (10/8/2018) Esophageal varices without bleeding ([address] (10/8/2018) Essential hypertension (10/30/2018) Leukocytosis (3/26/2019) SBP (spontaneous bacterial peritonitis) ([address] (3/26/2019) Nausea & vomiting (3/26/2019) Hyperkalemia (3/26/2019) NATE (acute kidney injury) ([address] (3/26/2019) Normocytic anemia (3/26/2019) HPI; Mr. Celestino Taylor is a chronically debilitated 79yo male with a hx of cirrhosis of the liver, requiring 5 previous paracentesis, who had one 3/25 as an outpt of about 6L of ascities. He started feeling poorly shortly after with abd pain , nausea, loss of apetite. He reportedly received 4 units of albumin as well. He described sharp, knife stabbing pain in his abdomen. He had chills and subjective fever. On the day of presentation to ED, he was unable to stand or ambulate due to profound weakness. CT of the abd/pelvis showed a lg volume of intraabdominal ascites, macronodular cirrhosis with sequelae of portal htn with mild splenomegaly . There was perigastric and periesophageal varices as well. Incidentally noted was a 1.9cm density in the left kidney. His Na was low at 119. He was started on IV albumin, IVFs and IV Cefepime. SBP was suspected in that the ascitic fluid gram stain showed GPC and moderate streptococcus. Possible contaminant. His ascites WBC and neutrophils did not reveal SBP. The culture is growing a Strep.   
The pt has hypervolemic hypo-osmolar hyponatremia due to underlying cirrhosis. He was placed on IV lasix as well. His Na remains 119. His outpt hepatologist Dr. Clarisse Blair is considering TIPS. His mentation has remained clear. Pt reports lives in apartment with 2 steps to enter, has required assist with ADLs from spouse, some sit to stand assist and increase difficulty with ambulation. Pt normally uses cane for ambulation, recently borrowed RW for increased support. Pt today required MIN A x 2 for sit to stand transfers from elevated bed. Pt overall Min A with ambulation, very slow fawad. Ambulated 10 feet. OT pending. Pt and wife report that pt is quite stubborn. He is unlikely to cooperate with an organized rehab schedule. He states, \" if I dont want to do it, I wont. \" His wife has assisted him with ADLs for some time. She needs him to be able to transfer himself, at least.  
 
 
 
Past Medical History:  
Diagnosis Date  Bronchitis  Cirrhosis of liver with ascites ([facility]  Hypertension  Ulcer of ileum   
 ulcer Family History Problem Relation Age of Onset  COPD Mother  Heart Attack Father 52 Social History Tobacco Use  Smoking status: Never Smoker  Smokeless tobacco: Never Used Substance Use Topics  Alcohol use: No  
  Comment: None since 7/2018 Home Environment: Apartment # Steps to Enter: 2 One/Two Story Residence: One story Living Alone: No 
Support Systems: Family member(s) Patient Expects to be Discharged to[de-identified] Rehabilitation facility Current DME Used/Available at Home: Cane, straight, Safety frame toliet, Shower chair, Walker, rolling Tub or Shower Type: Tub/Shower combination Prior Level of Function/Work/Activity: 
Lives with spouse, uses cane for ambulation, has required increased assist ADLs, no recent falls, drives Past Surgical History:  
Procedure Laterality Date  HX APPENDECTOMY  HX ORTHOPAEDIC    
 carpal tunnel  HX ORTHOPAEDIC    
 back surgery Prior to Admission medications Medication Sig Start Date End Date Taking? Authorizing Provider  
furosemide (LASIX) 80 mg tablet Take 1 Tab by mouth daily. 19   Kiah Ruiz MD  
magnesium oxide (MAG-OX) 400 mg tablet Take 1 Tab by mouth daily. 19   Kiah Ruiz MD  
ferrous sulfate (IRON) 325 mg (65 mg iron) tablet Take 27 mg by mouth Daily (before breakfast). Other, MD Sophia  
gabapentin (NEURONTIN) 400 mg capsule Take 1 Cap by mouth three (3) times daily. Patient taking differently: Take 400 mg by mouth three (3) times daily. 18   Joslyn Pena MD  
 
Allergies Allergen Reactions  Hydromet [Hydrocodone-Homatropine] Nausea Only Dry heaves Review of Systems Constitutional: Positive for chills and subjective fever. Respiratory: Negative for cough and shortness of breath. Cardiovascular: Negative for chest pain and palpitations. Gastrointestinal: Positive for abdominal pain, nausea and vomiting and loss of appetite Negative for constipation, diarrhea, hematochezia and melena. Genitourinary: Negative for dysuria, flank pain, frequency and hematuria. Musculoskeletal: Positive for back pain, joint pain, Negative for neck pain. Skin: Negative for color change and rash. Neurological: Negative for syncope and headaches. Positive for tremor and weakness All other systems reviewed and are negative. Objective:  
 
Vitals: 
Blood pressure 104/69, pulse 80, temperature 97.5 °F (36.4 °C), resp. rate 18, height 5' 9\" (1.753 m), weight 213 lb (96.6 kg), SpO2 95 %. Temp (24hrs), Av °F (36.7 °C), Min:97.5 °F (36.4 °C), Max:98.5 °F (36.9 °C) Intake and Output: 
No intake/output data recorded. Physical Exam: 
General:  Alert, oriented and mood affect appropriate; very cooperative. Sitting up in chair having just worked with PT  
Lungs:   Clear to auscultation bilaterally, few expiratory wheezes, dec bs at bases Heart:  Regular rate and rhythm, S1, S2 stable, no murmur, click, rub or gallop. Abdomen:   Firm and distended, non-tender. Bowel sounds present. No rebound or guarding Genitourinary: defer Neuro Muscular: L>R sided weakness, especially prox with shoulder flexion and Hip flexion, abduction of LEs 3+, adduction 4+, DF 4 No dysmetria. Mild LUE drift No asterixis , no tremor, equal coord in hands  symmetrical. Speech clear and appropriate, fcs well Skin:  No rashes, lesions, or signs/symptoms or infection. 2+ pitting edema up to his knees bilaterally Labs/Studies: 
Recent Results (from the past 72 hour(s)) CELL COUNT, BODY FLUID Collection Time: 03/25/19 11:08 AM  
Result Value Ref Range BODY FLUID TYPE PARACENTESIS FLUID FLUID COLOR YELLOW    
 FLUID APPEARANCE HAZY FLUID RBC CT. <1,000 /cu mm FLUID WBC COUNT 128 /cu mm  
 FLD NEUTROPHILS 4 % FLD LYMPHS 95 % FLD MONO/MACROPHAGES 1 % CULTURE, BODY FLUID W GRAM STAIN Collection Time: 03/25/19 11:08 AM  
Result Value Ref Range Special Requests: NO SPECIAL REQUESTS    
 GRAM STAIN 0 TO 1 WBC/OIF   
 GRAM STAIN FEW GRAM POSITIVE COCCI Culture result: MODERATE STREPTOCOCCUS SALIVARIUS (A) Susceptibility Streptococcus salivarius - VENTURA Penicillin G ($$) 0.5 Intermediate ug/mL Clindamycin ($) 0.0625 Susceptible ug/mL Vancomycin ($) 0.5 Susceptible ug/mL Cefepime ($$) 0.25 Susceptible ug/mL Ceftriaxone ($) 0.125 Susceptible ug/mL Amoxicillin 0.5 Intermediate ug/mL PROTHROMBIN TIME + INR Collection Time: 03/26/19  2:44 PM  
Result Value Ref Range Prothrombin time 16.5 (H) 11.7 - 14.5 sec INR 1.4 PTT Collection Time: 03/26/19  2:44 PM  
Result Value Ref Range aPTT 37.0 24.7 - 39.8 SEC  
CBC WITH AUTOMATED DIFF Collection Time: 03/26/19  2:44 PM  
Result Value Ref Range WBC 15.5 (H) 4.3 - 11.1 K/uL  
 RBC 3.74 (L) 4.23 - 5.6 M/uL HGB 11.2 (L) 13.6 - 17.2 g/dL HCT 32.6 (L) 41.1 - 50.3 % MCV 87.2 79.6 - 97.8 FL  
 MCH 29.9 26.1 - 32.9 PG  
 MCHC 34.4 31.4 - 35.0 g/dL  
 RDW 15.7 (H) 11.9 - 14.6 % PLATELET 396 813 - 693 K/uL MPV 10.4 9.4 - 12.3 FL ABSOLUTE NRBC 0.00 0.0 - 0.2 K/uL  
 DF AUTOMATED NEUTROPHILS 83 (H) 43 - 78 % LYMPHOCYTES 4 (L) 13 - 44 % MONOCYTES 12 4.0 - 12.0 % EOSINOPHILS 0 (L) 0.5 - 7.8 % BASOPHILS 0 0.0 - 2.0 % IMMATURE GRANULOCYTES 1 0.0 - 5.0 %  
 ABS. NEUTROPHILS 12.8 (H) 1.7 - 8.2 K/UL  
 ABS. LYMPHOCYTES 0.6 0.5 - 4.6 K/UL  
 ABS. MONOCYTES 1.9 (H) 0.1 - 1.3 K/UL  
 ABS. EOSINOPHILS 0.0 0.0 - 0.8 K/UL  
 ABS. BASOPHILS 0.0 0.0 - 0.2 K/UL  
 ABS. IMM. GRANS. 0.2 0.0 - 0.5 K/UL METABOLIC PANEL, COMPREHENSIVE Collection Time: 03/26/19  2:44 PM  
Result Value Ref Range Sodium 118 (LL) 136 - 145 mmol/L Potassium 6.0 (H) 3.5 - 5.1 mmol/L Chloride 86 (L) 98 - 107 mmol/L  
 CO2 24 21 - 32 mmol/L Anion gap 8 7 - 16 mmol/L Glucose 150 (H) 65 - 100 mg/dL BUN 28 (H) 8 - 23 MG/DL Creatinine 1.87 (H) 0.8 - 1.5 MG/DL  
 GFR est AA 46 (L) >60 ml/min/1.73m2 GFR est non-AA 38 (L) >60 ml/min/1.73m2 Calcium 8.7 8.3 - 10.4 MG/DL Bilirubin, total 1.8 (H) 0.2 - 1.1 MG/DL  
 ALT (SGPT) 31 12 - 65 U/L  
 AST (SGOT) 56 (H) 15 - 37 U/L Alk. phosphatase 105 50 - 136 U/L Protein, total 6.1 (L) 6.3 - 8.2 g/dL Albumin 2.7 (L) 3.2 - 4.6 g/dL Globulin 3.4 2.3 - 3.5 g/dL A-G Ratio 0.8 (L) 1.2 - 3.5 LIPASE Collection Time: 03/26/19  2:44 PM  
Result Value Ref Range Lipase 253 73 - 393 U/L  
AMMONIA Collection Time: 03/26/19 10:00 PM  
Result Value Ref Range Ammonia 23 11 - 32 UMOL/L  
METABOLIC PANEL, COMPREHENSIVE Collection Time: 03/26/19 10:00 PM  
Result Value Ref Range Sodium 119 (LL) 136 - 145 mmol/L Potassium 6.1 (HH) 3.5 - 5.1 mmol/L Chloride 88 (L) 98 - 107 mmol/L  
 CO2 24 21 - 32 mmol/L  Anion gap 7 7 - 16 mmol/L  
 Glucose 102 (H) 65 - 100 mg/dL BUN 29 (H) 8 - 23 MG/DL Creatinine 1.74 (H) 0.8 - 1.5 MG/DL  
 GFR est AA 50 (L) >60 ml/min/1.73m2 GFR est non-AA 41 (L) >60 ml/min/1.73m2 Calcium 8.6 8.3 - 10.4 MG/DL Bilirubin, total 1.9 (H) 0.2 - 1.1 MG/DL  
 ALT (SGPT) 34 12 - 65 U/L  
 AST (SGOT) 68 (H) 15 - 37 U/L Alk. phosphatase 108 50 - 136 U/L Protein, total 6.1 (L) 6.3 - 8.2 g/dL Albumin 2.7 (L) 3.2 - 4.6 g/dL Globulin 3.4 2.3 - 3.5 g/dL A-G Ratio 0.8 (L) 1.2 - 3.5    
CBC WITH AUTOMATED DIFF Collection Time: 03/26/19 10:00 PM  
Result Value Ref Range WBC 14.2 (H) 4.3 - 11.1 K/uL  
 RBC 3.82 (L) 4.23 - 5.6 M/uL  
 HGB 11.4 (L) 13.6 - 17.2 g/dL HCT 33.1 (L) 41.1 - 50.3 % MCV 86.6 79.6 - 97.8 FL  
 MCH 29.8 26.1 - 32.9 PG  
 MCHC 34.4 31.4 - 35.0 g/dL  
 RDW 15.7 (H) 11.9 - 14.6 % PLATELET 030 514 - 721 K/uL MPV 10.3 9.4 - 12.3 FL ABSOLUTE NRBC 0.00 0.0 - 0.2 K/uL  
 DF AUTOMATED NEUTROPHILS 79 (H) 43 - 78 % LYMPHOCYTES 4 (L) 13 - 44 % MONOCYTES 15 (H) 4.0 - 12.0 % EOSINOPHILS 0 (L) 0.5 - 7.8 % BASOPHILS 0 0.0 - 2.0 % IMMATURE GRANULOCYTES 1 0.0 - 5.0 %  
 ABS. NEUTROPHILS 11.2 (H) 1.7 - 8.2 K/UL  
 ABS. LYMPHOCYTES 0.6 0.5 - 4.6 K/UL  
 ABS. MONOCYTES 2.2 (H) 0.1 - 1.3 K/UL  
 ABS. EOSINOPHILS 0.0 0.0 - 0.8 K/UL  
 ABS. BASOPHILS 0.0 0.0 - 0.2 K/UL  
 ABS. IMM. GRANS. 0.2 0.0 - 0.5 K/UL  
CBC WITH AUTOMATED DIFF Collection Time: 03/27/19  6:24 AM  
Result Value Ref Range WBC 11.5 (H) 4.3 - 11.1 K/uL  
 RBC 3.25 (L) 4.23 - 5.6 M/uL HGB 9.7 (L) 13.6 - 17.2 g/dL HCT 28.3 (L) 41.1 - 50.3 % MCV 87.1 79.6 - 97.8 FL  
 MCH 29.8 26.1 - 32.9 PG  
 MCHC 34.3 31.4 - 35.0 g/dL  
 RDW 15.8 (H) 11.9 - 14.6 % PLATELET 430 406 - 715 K/uL MPV 10.6 9.4 - 12.3 FL ABSOLUTE NRBC 0.00 0.0 - 0.2 K/uL  
 DF AUTOMATED NEUTROPHILS 74 43 - 78 % LYMPHOCYTES 5 (L) 13 - 44 % MONOCYTES 19 (H) 4.0 - 12.0 % EOSINOPHILS 1 0.5 - 7.8 % BASOPHILS 0 0.0 - 2.0 % IMMATURE GRANULOCYTES 1 0.0 - 5.0 %  
 ABS. NEUTROPHILS 8.5 (H) 1.7 - 8.2 K/UL  
 ABS. LYMPHOCYTES 0.6 0.5 - 4.6 K/UL  
 ABS. MONOCYTES 2.2 (H) 0.1 - 1.3 K/UL  
 ABS. EOSINOPHILS 0.1 0.0 - 0.8 K/UL  
 ABS. BASOPHILS 0.0 0.0 - 0.2 K/UL  
 ABS. IMM. GRANS. 0.1 0.0 - 0.5 K/UL METABOLIC PANEL, COMPREHENSIVE Collection Time: 03/27/19  6:24 AM  
Result Value Ref Range Sodium 120 (LL) 136 - 145 mmol/L Potassium 5.6 (H) 3.5 - 5.1 mmol/L Chloride 89 (L) 98 - 107 mmol/L  
 CO2 25 21 - 32 mmol/L Anion gap 6 (L) 7 - 16 mmol/L Glucose 84 65 - 100 mg/dL BUN 30 (H) 8 - 23 MG/DL Creatinine 1.62 (H) 0.8 - 1.5 MG/DL  
 GFR est AA 54 (L) >60 ml/min/1.73m2 GFR est non-AA 45 (L) >60 ml/min/1.73m2 Calcium 8.5 8.3 - 10.4 MG/DL Bilirubin, total 2.1 (H) 0.2 - 1.1 MG/DL  
 ALT (SGPT) 29 12 - 65 U/L  
 AST (SGOT) 58 (H) 15 - 37 U/L Alk. phosphatase 86 50 - 136 U/L Protein, total 5.7 (L) 6.3 - 8.2 g/dL Albumin 2.9 (L) 3.2 - 4.6 g/dL Globulin 2.8 2.3 - 3.5 g/dL A-G Ratio 1.0 (L) 1.2 - 3.5 CULTURE, BLOOD Collection Time: 03/27/19  9:52 AM  
Result Value Ref Range Special Requests: LEFT 
HAND Culture result: NO GROWTH AFTER 20 HOURS    
CULTURE, BLOOD Collection Time: 03/27/19  9:52 AM  
Result Value Ref Range Special Requests: RIGHT 
HAND Culture result: NO GROWTH AFTER 20 HOURS    
LACTIC ACID Collection Time: 03/27/19  9:52 AM  
Result Value Ref Range Lactic acid 1.4 0.4 - 2.0 MMOL/L  
PROCALCITONIN Collection Time: 03/27/19  9:52 AM  
Result Value Ref Range Procalcitonin 1.9 ng/mL AMMONIA Collection Time: 03/27/19  9:52 AM  
Result Value Ref Range Ammonia 89 (H) 11 - 32 UMOL/L  
METABOLIC PANEL, COMPREHENSIVE Collection Time: 03/27/19  1:59 PM  
Result Value Ref Range Sodium 119 (LL) 136 - 145 mmol/L Potassium 5.6 (H) 3.5 - 5.1 mmol/L  Chloride 90 (L) 98 - 107 mmol/L  
 CO2 21 21 - 32 mmol/L  
 Anion gap 8 7 - 16 mmol/L Glucose 98 65 - 100 mg/dL BUN 30 (H) 8 - 23 MG/DL Creatinine 1.47 0.8 - 1.5 MG/DL  
 GFR est AA >60 >60 ml/min/1.73m2 GFR est non-AA 50 (L) >60 ml/min/1.73m2 Calcium 8.3 8.3 - 10.4 MG/DL Bilirubin, total 2.2 (H) 0.2 - 1.1 MG/DL  
 ALT (SGPT) 31 12 - 65 U/L  
 AST (SGOT) 63 (H) 15 - 37 U/L Alk. phosphatase 94 50 - 136 U/L Protein, total 5.8 (L) 6.3 - 8.2 g/dL Albumin 3.1 (L) 3.2 - 4.6 g/dL Globulin 2.7 2.3 - 3.5 g/dL A-G Ratio 1.1 (L) 1.2 - 3.5 METABOLIC PANEL, COMPREHENSIVE Collection Time: 03/27/19  9:30 PM  
Result Value Ref Range Sodium 119 (LL) 136 - 145 mmol/L Potassium 5.1 3.5 - 5.1 mmol/L Chloride 88 (L) 98 - 107 mmol/L  
 CO2 23 21 - 32 mmol/L Anion gap 8 7 - 16 mmol/L Glucose 110 (H) 65 - 100 mg/dL BUN 29 (H) 8 - 23 MG/DL Creatinine 1.42 0.8 - 1.5 MG/DL  
 GFR est AA >60 >60 ml/min/1.73m2 GFR est non-AA 52 (L) >60 ml/min/1.73m2 Calcium 8.0 (L) 8.3 - 10.4 MG/DL Bilirubin, total 2.0 (H) 0.2 - 1.1 MG/DL  
 ALT (SGPT) 31 12 - 65 U/L  
 AST (SGOT) 58 (H) 15 - 37 U/L Alk. phosphatase 111 50 - 136 U/L Protein, total 5.8 (L) 6.3 - 8.2 g/dL Albumin 3.0 (L) 3.2 - 4.6 g/dL Globulin 2.8 2.3 - 3.5 g/dL A-G Ratio 1.1 (L) 1.2 - 3.5    
CBC WITH AUTOMATED DIFF Collection Time: 03/28/19  6:39 AM  
Result Value Ref Range WBC 7.9 4.3 - 11.1 K/uL  
 RBC 3.13 (L) 4.23 - 5.6 M/uL HGB 9.4 (L) 13.6 - 17.2 g/dL HCT 27.5 (L) 41.1 - 50.3 % MCV 87.9 79.6 - 97.8 FL  
 MCH 30.0 26.1 - 32.9 PG  
 MCHC 34.2 31.4 - 35.0 g/dL  
 RDW 15.5 (H) 11.9 - 14.6 % PLATELET 107 (L) 358 - 450 K/uL MPV 10.0 9.4 - 12.3 FL ABSOLUTE NRBC 0.00 0.0 - 0.2 K/uL  
 DF AUTOMATED NEUTROPHILS 70 43 - 78 % LYMPHOCYTES 7 (L) 13 - 44 % MONOCYTES 20 (H) 4.0 - 12.0 % EOSINOPHILS 1 0.5 - 7.8 % BASOPHILS 0 0.0 - 2.0 % IMMATURE GRANULOCYTES 2 0.0 - 5.0 %  
 ABS. NEUTROPHILS 5.6 1.7 - 8.2 K/UL ABS. LYMPHOCYTES 0.6 0.5 - 4.6 K/UL  
 ABS. MONOCYTES 1.5 (H) 0.1 - 1.3 K/UL  
 ABS. EOSINOPHILS 0.1 0.0 - 0.8 K/UL  
 ABS. BASOPHILS 0.0 0.0 - 0.2 K/UL  
 ABS. IMM. GRANS. 0.1 0.0 - 0.5 K/UL METABOLIC PANEL, COMPREHENSIVE Collection Time: 03/28/19  6:39 AM  
Result Value Ref Range Sodium 119 (LL) 136 - 145 mmol/L Potassium 4.7 3.5 - 5.1 mmol/L Chloride 89 (L) 98 - 107 mmol/L  
 CO2 24 21 - 32 mmol/L Anion gap 6 (L) 7 - 16 mmol/L Glucose 97 65 - 100 mg/dL BUN 29 (H) 8 - 23 MG/DL Creatinine 1.60 (H) 0.8 - 1.5 MG/DL  
 GFR est AA 55 (L) >60 ml/min/1.73m2 GFR est non-AA 46 (L) >60 ml/min/1.73m2 Calcium 8.4 8.3 - 10.4 MG/DL Bilirubin, total 1.9 (H) 0.2 - 1.1 MG/DL  
 ALT (SGPT) 32 12 - 65 U/L  
 AST (SGOT) 56 (H) 15 - 37 U/L Alk. phosphatase 113 50 - 136 U/L Protein, total 5.8 (L) 6.3 - 8.2 g/dL Albumin 3.1 (L) 3.2 - 4.6 g/dL Globulin 2.7 2.3 - 3.5 g/dL A-G Ratio 1.1 (L) 1.2 - 3.5 PROTHROMBIN TIME + INR Collection Time: 03/28/19  6:39 AM  
Result Value Ref Range Prothrombin time 17.2 (H) 11.7 - 14.5 sec INR 1.4 Functional Assessment: 
Functional Assessment Decline in Gait/Transfer/Balance: No 
Decline in Capacity to Feed/Dress/Bathe: No 
Developmental Delay: No 
Chewing/Swallowing Problems: No 
Difficulty with Secretions: No 
Speech Slurred/Thick/Garbled: No  
 
 
Ambulation: Activity and Safety Activity Level: Up with Assistance Ambulate: Ambulate to bathroom Activity: In bed, Family/Visitors present Activity Assistance: Partial (two people) Weight Bearing Status: WBAT (Weight Bearing as Tolerated) Mode of Transportation: Stretcher, IV equipment Repositioned: Head of bed elevated (degrees) Patient Turned: Turns self Head of Bed Elevated: Self regulated Activity Response: Poorly tolerated Assistive Device: Fall prevention device Safety Measures: Bed/Chair-Wheels locked, Bed in low position, Call light within reach, Family at bedside, Gripper socks, Side rails X2 Impression/Plan:  
 
Principal Problem: Hyponatremia (12/28/2018) Active Problems: 
  Alcoholic cirrhosis of liver with ascites ([facility] [address] (10/8/2018) Esophageal varices without bleeding ([address] (10/8/2018) Essential hypertension (10/30/2018) Leukocytosis (3/26/2019) SBP (spontaneous bacterial peritonitis) ([address] (3/26/2019) Nausea & vomiting (3/26/2019) Hyperkalemia (3/26/2019) NATE (acute kidney injury) ([address] (3/26/2019) Normocytic anemia (3/26/2019) Multifactorial profound acute on chronic debilitation Recommendations: Continue Acute Rehab Program 
Coordination of rehab/medical care Counseling of PM & R care issues management Monitoring and management of medical conditions per plan of care/orders  
-pt is stating that he will likely not participate at an [facility] level of care. They are open to SNF but 'only for a few days'. Wife assists the pt with most ADLs at home and he has no interest in increasing his own independence. Spoke with pt about the benefits of increasing his own self care abilities, strength and endurance. \" That's what she's here for. \" (alluding to his wife0 
-ppd, SNF vs HH. He needs to be able to transfer with the least amt of assistance possible and walk short distances.  
-on exam, the pt has mild left sided weakness, including subtle lower left facial weakness a the corner of his mouth vs the right. Reports chronic? Discussion with Family/Caregiver/Staff Reviewed Therapies/Labs/Meds/Records Signed By:  Carmelina Cabello MD   
 March 28, 2019

## 2019-03-28 NOTE — PROGRESS NOTES
[facility] Progress Note Admit Date:  3/26/2019 Today's Date:  3/28/2019 CC:  Cirrhosis and ascites Subjective:  
 
Patient states he has had increased swelling of his abdomen and LE. He has had off and on sharp pain over his abdomen. He has SOB associated with the swelling. He has not had a BM today, but has about 3 BMs yesterday, without bleeding. He denies any nausea or vomiting. Medications:  
Current Facility-Administered Medications Medication Dose Route Frequency  cefTRIAXone (ROCEPHIN) 2 g in 0.9% sodium chloride (MBP/ADV) 50 mL  2 g IntraVENous Q24H  
 furosemide (LASIX) injection 40 mg  40 mg IntraVENous BID  sodium chloride tablet 1 g  1 g Oral TID WITH MEALS  
 albumin human 25% (BUMINATE) solution 12.5 g  12.5 g IntraVENous Q6H  
 lactulose (CHRONULAC) solution 30 g  30 g Oral TID  heparin (porcine) injection 5,000 Units  5,000 Units SubCUTAneous Q12H  
 tuberculin injection 5 Units  5 Units IntraDERMal ONCE  
 ferrous sulfate tablet 325 mg  325 mg Oral ACB  gabapentin (NEURONTIN) capsule 400 mg  400 mg Oral TID  magnesium oxide (MAG-OX) tablet 400 mg  400 mg Oral DAILY  sodium chloride (NS) flush 5-40 mL  5-40 mL IntraVENous Q8H  
 sodium chloride (NS) flush 5-40 mL  5-40 mL IntraVENous PRN  
 acetaminophen (TYLENOL) tablet 650 mg  650 mg Oral Q4H PRN  
 ondansetron (ZOFRAN) injection 4 mg  4 mg IntraVENous Q4H PRN  
 magnesium hydroxide (MILK OF MAGNESIA) 400 mg/5 mL oral suspension 30 mL  30 mL Oral DAILY PRN  
 zolpidem (AMBIEN) tablet 5 mg  5 mg Oral QHS PRN Review of Systems: ROS was obtained, with pertinent positives as listed above. No chest pain. Diet:  Regular Objective:  
Vitals: 
Visit Vitals /69 (BP 1 Location: Left arm, BP Patient Position: At rest) Pulse 80 Temp 97.5 °F (36.4 °C) Resp 18 Ht 5' 9\" (1.753 m) Wt 96.6 kg (213 lb) SpO2 95% BMI 31.45 kg/m² Intake/Output: No intake/output data recorded. No intake/output data recorded. Exam: 
General appearance: alert, cooperative, no distress, sitting in chair at bedside Lungs: coarse BS to auscultation bilaterally anteriorly Heart: regular rate and rhythm, murmur noted Abdomen: distended, tight, non-tender. Bowel sounds normal. No masses, no organomegaly Extremities: pitting edema LE bilaterally Neuro:  alert and oriented Data Review (Labs):   
Recent Labs  
  03/28/19 
1056 03/27/19 
2130 03/27/19 
1359 03/27/19 
0624 03/26/19 
2200 03/26/19 
1444 WBC 7.9  --   --  11.5* 14.2* 15.5* HGB 9.4*  --   --  9.7* 11.4* 11.2* HCT 27.5*  --   --  28.3* 33.1* 32.6*  
*  --   --  162 204 208 MCV 87.9  --   --  87.1 86.6 87.2 * 119* 119* 120* 119* 118* K 4.7 5.1 5.6* 5.6* 6.1* 6.0*  
CL 89* 88* 90* 89* 88* 86* CO2 24 23 21 25 24 24 BUN 29* 29* 30* 30* 29* 28* CREA 1.60* 1.42 1.47 1.62* 1.74* 1.87* CA 8.4 8.0* 8.3 8.5 8.6 8.7 GLU 97 110* 98 84 102* 150*  111 94 86 108 105 SGOT 56* 58* 63* 58* 68* 56* ALT 32 31 31 29 34 31 TBILI 1.9* 2.0* 2.2* 2.1* 1.9* 1.8* ALB 3.1* 3.0* 3.1* 2.9* 2.7* 2.7* TP 5.8* 5.8* 5.8* 5.7* 6.1* 6.1*  
LPSE  --   --   --   --   --  253 PTP 17.2*  --   --   --   --  16.5* INR 1.4  --   --   --   --  1.4 APTT  --   --   --   --   --  37.0 Ref. Range 3/27/2019 09:52 Lactic acid Latest Ref Range: 0.4 - 2.0 MMOL/L 1.4 Procalcitonin Latest Units: ng/mL 1.9 Ammonia Latest Ref Range: 11 - 32 UMOL/L 89 (H) Ref. Range 3/25/2019 11:08 BODY FLUID TYPE Latest Units:   PARACENTESIS FLUID FLUID APPEARANCE Latest Units:   HAZY FLUID COLOR Latest Units:   YELLOW  
FLUID RBC CT. Latest Units: /cu mm <1,000 FLUID WBC COUNT Latest Units: /cu mm 128 FLD NEUTROPHILS Latest Units: % 4 FLD LYMPHS Latest Units: % 95 FLD MONO/MACROPHAGES Latest Units: % 1 CULTURE, BODY FLUID W Deepthi Clark [[id number]] (Order [id number]) Microbiology Date: 3/25/2019 Department: [facility] Radiology Us Released By:  (auto-released) Authorizing: ROCIO Avina Specimen Information: Paracentesis Fluid  
    
Component Value Flag Ref Range Units Status Special Requests:      Final  
NO SPECIAL REQUESTS   
GRAM STAIN 0 TO 1 WBC/OIF     Final  
GRAM STAIN      Final  
FEW GRAM POSITIVE COCCI Culture result: Abnormal       Final  
MODERATE STREPTOCOCCUS SALIVARIUS Susceptibility Streptococcus salivarius Antibiotic Interpretation Value Method Comment Penicillin G ($$) Intermediate 0.5 ug/mL VENTURA Clindamycin ($) Susceptible 0.0625 ug/mL VENTURA Vancomycin ($) Susceptible 0.5 ug/mL VENTURA Cefepime ($$) Susceptible 0.25 ug/mL VENTURA Ceftriaxone ($) Susceptible 0.125 ug/mL VENTURA Amoxicillin Intermediate 0.5 ug/mL VENTURA Ultrasound guided paracentesis. 25 March 2019 History: Cirrhosis, ascites.  
:  Edita Hobbs PA-C  
Supervising Physician: Yusef Parkinson M.D.  
Consent:  Informed written and oral consent was obtained from the patient after 
the risks and benefits were discussed. All questions were answered and the 
patient requested that we proceed.  
Procedure:  Maximal sterile barrier technique was used. Following routine prep 
and drape of the abdomen, a local field block with 1% lidocaine was achieved. Ultrasound evaluation was performed.   
Fluid was identified in the peritoneal cavity. Using real-time ultrasound 
guidance, the peritoneal cavity was accessed with an 8 [place]ian centesis catheter. The catheter was connected to Vacutainer bottles.  
A total of 6400 cc of thin yellow fluid was removed and sent to the lab. The 
catheter was removed and a bandage applied.  
Complications: None.  Findings: Large-volume ascites.  IMPRESSION Impression: Uncomplicated ultrasound guided paracentesis. CT of the abdomen and pelvis without contrast. 26 March 2019 ADDITIONAL CLINICAL INFORMATION: Cirrhosis with paracentesis yesterday now with 
abdominal distention with nausea and vomiting. Comparison: Abdominal ultrasound dated March 25, 2019.  
Multiple axial images were obtained through the abdomen and pelvis without IV 
contrast.  Radiation dose reduction techniques were used for this study: All CT 
scans performed at this facility use one or all of the following: Automated 
exposure control, adjustment of the mA and/or kVp according to patient's size, 
iterative reconstruction.  
FINDINGS: 
LOWER THORAX: Moderately sized left-sided pleural effusion with atelectatic 
change of the left lung base. Extensive coronary artery calcifications.  LIVER: Atrophic liver with prominent macronodular contour consistent with 
cirrhosis, slight caudate lobe hypertrophy. Cannot evaluate for hepatic lesions 
with the lack of IV contrast.  
BILIARY SYSTEM: The gallbladder is contracted with slight wall thickening and 
with evidence of cholelithiasis.  
SPLEEN: Borderline splenomegaly.  
PANCREAS: No pancreatic mass. No pancreatic duct dilation.  ADRENALS: No adrenal nodule or adrenal hypertrophy.  
URINARY SYSTEM: Left lower pole exophytic renal cyst measuring 3 cm. Left 
superior pole exophytic 1.9 cm intermediate density lesion which is incompletely 
characterized.  
FLUID:  There is a large volume of ascites within the abdomen and pelvis.  
REPRODUCTIVE ORGANS: Unremarkable.  
BOWEL: There is diffuse wall thickening of the ascending and transverse colon 
which may be related to patient's cirrhosis. No evidence of bowel structure. There is diffuse wall thickening of the small bowel.  
VASCULAR/NODES: No aortic or iliac artery aneurysm. No lymphadenopathy. Perigastric and paraesophageal varices are noted.  
BONES/SUBCUTANEOUS TISSUE: Mild bilateral gynecomastia.  IMPRESSION IMPRESSION: 
1. Macronodular cirrhosis with sequela of portal hypertension with mild splenomegaly and with perigastric and periesophageal varices evident. Large 
volume of intra-abdominal ascites. 2. Moderately sized left-sided pleural effusion. 3. 1.9 cm intermediate density lesion of the left kidney which is exactly 
characterize recommend follow-up with MRI or renal mass CT. 4. Diffuse wall thickening of the ascending and transverse colon and mild wall 
thickening of the small bowel favored related to fluid status and cirrhosis. Assessment:  
 
Principal Problem: Hyponatremia (12/28/2018) Active Problems: 
  Alcoholic cirrhosis of liver with ascites ([address] (10/8/2018) Esophageal varices without bleeding ([address] (10/8/2018) Essential hypertension (10/30/2018) Leukocytosis (3/26/2019) SBP (spontaneous bacterial peritonitis) ([address] (3/26/2019) Nausea & vomiting (3/26/2019) Hyperkalemia (3/26/2019) NATE (acute kidney injury) ([address] (3/26/2019) Normocytic anemia (3/26/2019) 71 yo male with PMH ETOH cirrhosis and ascites, HTN, ileal ulcer, bronchitis, who was seen in consultation 27 March 2019 for cirrhosis, abdominal pain and n/v after a 6.5 L paracentesis. His ascitic WBC and neutrophils did not reveal SBP. The culture is growing a Strep. He has had decreased abd pain, but not resolved, and has had increased abd distention and LE edema. He [place] [facility] - following with Clifton Tejada PA-C at their [facility] office location, and they stated TIPS. He has remained hyponatremic at 119, but potassium has improved. Cr has increased back to 1.6 range. US and CT showed cirrhosis and large volume ascites. Plan:  
 
-Supportive care, maintain electrolytes. -Hold Lasix given increase in Cr. 
-Continue Albumin IV. -Consider renal consult - consider midodrine to help with Cr. 
-Fluid restriction given continue hyponatremia. 
-Can hold Lactulose if no constipation or confusion. 
-Regular 2 gm Na+ diet. 
-Follow. Patient is seen and examined in collaboration with Dr. Mirza Douglass. Assessment and plan as per Dr. Pamela Gandhi. Svetlana Hays PA-C [facility] Addendum: 
Discussed with Dr. Pamela Gandhi; Will order therapeutic paracentesis and have 100 gm Alb given IV with the paracentesis. Svetlana Hays PA-C [facility]

## 2019-03-28 NOTE — PROGRESS NOTES
Met with patient's and wife at bedside. Patient currently lives at home with spouse. Has 2 steps to enter. Prior to admission, patient required some assistance with adls. Has a walker, bsc, and shower chair at home. Verified insurance and pcp. Discussed discharge plan with patient and wife. They state they would like a referral placed to [facility]. Referral sent. Awaiting response. Discussed with patient and wife that if patient did not get into [facility], SNF rehab would be an option. Provided family with SNF list to review. CM will continue to follow.

## 2019-03-28 NOTE — CONSULTS
Nephrology consult Admission Date: 
3/26/2019 Admission Diagnosis Hyponatremia [E87.1] We are asked by Dr. Oswaldo Story History of Present Illness: Mr. Santino Gould is a 72 y.o. Male admitted with complaints of nausea, vomiting, abdominal pain after reportedly having paracentesis 3/25/19 with 6l fluid removal and receiving IV albumin. PMH significant for cirrhosis with ascites, HTN, bronchitis, and ulcer of ileum. Er labs on admission revealed serum Na 118, K 6.0, Creatinine 1.87, glucose 150,  AST 56, ammonia 89, WBC 15.5. Paracentesis fluid postive with moderate streptococcus salivarius. We are consulted for hyponatremia/ NATE. From a renal standpoint his baseline creatinine is 0.5-1.2, Cr on admission was 1.87-->1.62-->1.42-->1.60 today. Serum Na on admission 118-->120-->119 today. Home meds significant for Lasix, not on SSRI or HCTZ. History of hyponatremia 118 in December 2018. Patient seen and examined on rounds he reports orthopnea with increase in abdominal distension more frequently now. He reports having nausea and vomiting all day Monday and Tuesday after paracentesis with poor intake, N/V resolved with antiemetics no N/V yesterday. Abdominal distension was worse Monday prior to paracentesis. He denies fever/ chills, CP, diarrhea, no dysuria, flank pain, urgency or hesitancy. Past Medical History:  
Diagnosis Date  Bronchitis  Cirrhosis of liver with ascites ([address]  Hypertension  Ulcer of ileum   
 ulcer Past Surgical History:  
Procedure Laterality Date  HX APPENDECTOMY  HX ORTHOPAEDIC    
 carpal tunnel  HX ORTHOPAEDIC    
 back surgery Current Facility-Administered Medications Medication Dose Route Frequency  cefTRIAXone (ROCEPHIN) 2 g in 0.9% sodium chloride (MBP/ADV) 50 mL  2 g IntraVENous Q24H  
 furosemide (LASIX) injection 40 mg  40 mg IntraVENous BID  sodium chloride tablet 1 g  1 g Oral TID WITH MEALS  
  albumin human 25% (BUMINATE) solution 12.5 g  12.5 g IntraVENous Q6H  
 lactulose (CHRONULAC) solution 30 g  30 g Oral TID  heparin (porcine) injection 5,000 Units  5,000 Units SubCUTAneous Q12H  
 tuberculin injection 5 Units  5 Units IntraDERMal ONCE  
 ferrous sulfate tablet 325 mg  325 mg Oral ACB  gabapentin (NEURONTIN) capsule 400 mg  400 mg Oral TID  magnesium oxide (MAG-OX) tablet 400 mg  400 mg Oral DAILY  sodium chloride (NS) flush 5-40 mL  5-40 mL IntraVENous Q8H  
 sodium chloride (NS) flush 5-40 mL  5-40 mL IntraVENous PRN  
 acetaminophen (TYLENOL) tablet 650 mg  650 mg Oral Q4H PRN  
 ondansetron (ZOFRAN) injection 4 mg  4 mg IntraVENous Q4H PRN  
 magnesium hydroxide (MILK OF MAGNESIA) 400 mg/5 mL oral suspension 30 mL  30 mL Oral DAILY PRN  
 zolpidem (AMBIEN) tablet 5 mg  5 mg Oral QHS PRN Allergies Allergen Reactions  Hydromet [Hydrocodone-Homatropine] Nausea Only Dry heaves Social History Tobacco Use  Smoking status: Never Smoker  Smokeless tobacco: Never Used Substance Use Topics  Alcohol use: No  
  Comment: None since 7/2018 Family History Problem Relation Age of Onset  COPD Mother  Heart Attack Father 52 Review of Systems Gen - no fever, no chills, appetite ok HEENT - no sore throat CV - no chest pain, no palpitation, + orthopnea Lung - + shortness of breath, no cough, no hemoptysis Abd - no tenderness, no nausea/vomiting, no bloody stool, abdominal distension soft reports was much worse prior to paracentesis Ext - ++ BLE edema, no clubbing, no cyanosis Musculoskeletal - no joint pain, + back pain Neurologic - no headaches, no dizziness, no seizures Psychiatric - no anxiety, no depression Skin - no rashes, no pupura Genitourinary - no decreased urine output, no hematuria, no foamy urine Objective:  
 
Vitals:  
 03/27/19 1919 03/27/19 2334 03/28/19 7398 03/28/19 0732 BP: 104/70 106/72 93/63 104/69 Pulse: 90 82 85 80 Resp: 19 18 18 18 Temp: 97.9 °F (36.6 °C) 98.2 °F (36.8 °C) 98.5 °F (36.9 °C) 97.5 °F (36.4 °C) SpO2: 95% 94% 94% 95% Weight:      
Height:      
 
No intake or output data in the 24 hours ending 03/28/19 1103 Physical Exam 
GEN :in no distress, alert and oriented HEENT: anicteric sclerae, eomi. Oropharynx without lesions. Mucous membranes are dry Neck - supple without JVD 
CV - regular rate and rhythm, no Rub Lung - clear bilaterally, lungs expand symmetrically, no wheezes or crackles Abd - soft, nontender, bowel sounds present, distended Ext - no clubbing, no cyanosis, +2 BLE pitting edema Neurologic - nonfocal 
Skin - no rashes, no purpura, no ecchymoses Psychiatric: Normal mood and affect. Data Review:  
Recent Labs  
  03/28/19 
0639 03/27/19 
0624 03/26/19 
2200 WBC 7.9 11.5* 14.2* HGB 9.4* 9.7* 11.4* HCT 27.5* 28.3* 33.1*  
* 162 204 Recent Labs  
  03/28/19 
6167 03/27/19 
2130 03/27/19 
1359 * 119* 119*  
K 4.7 5.1 5.6*  
CL 89* 88* 90* CO2 24 23 21 BUN 29* 29* 30* CREA 1.60* 1.42 1.47 GLU 97 110* 98  
CA 8.4 8.0* 8.3 No results for input(s): PH, PCO2, PO2, PCO2 in the last 72 hours. Problem List:  
 
Patient Active Problem List  
 Diagnosis Date Noted  Leukocytosis 03/26/2019  
 SBP (spontaneous bacterial peritonitis) ([facility] 03/26/2019  Nausea & vomiting 03/26/2019  Hyperkalemia 03/26/2019  NATE (acute kidney injury) ([facility] 03/26/2019  Normocytic anemia 03/26/2019  Pleural effusion, left 01/16/2019  Hyponatremia 12/28/2018  Murmur, cardiac 11/20/2018  Essential hypertension 10/30/2018  Alcoholic cirrhosis of liver with ascites ([address] 10/08/2018  Esophageal varices without bleeding ([facility] 10/08/2018 Impression: 
 
Plan: 1. Hyponatremia likely 2/2 paracentesis induced circulatory dysfunction, will likley have a component of chronic hyponatremia (2/28 Na was 133). -d/c IVF continue lasix and IV albumin with fluid restriction and trend Na closely 2. NATE 2/2 to #1 creatinine 1.60, non oliguria, abdominal US right kidney 10.9 cm, left kidney 11.7 cm left kidney cyst, no hydronephrosis 
-follow renal function and UOP 3. Hyperkalemia improved 4. Cirrhosis with ascites -GI following recommends 100 g albumin with next paracentesis - pt on cefepime culture growing Strep 
- outpt hepatologist Dr. Anthony De Dios is considering TIPS. 5. Anemia- stable trend 
- on PO Fe

## 2019-03-29 NOTE — PROGRESS NOTES
. 
[facility] Progress Note Date: 3/29/2019 GI Problem: Etoh cirrhosis History of Present Illness:  Patient is a 72 y.o. male who is seen in consultation for Etoh cirrhosis, ascites, and hyponatremia. Glad Na came up from 119 to 121. Cer is 1.59. Has lots leg edema. Abdomen very distended. On abx for recent SBP. Hospital Medications: 
Current Facility-Administered Medications Medication Dose Route Frequency  albumin human 25% (BUMINATE) solution 12.5 g  12.5 g IntraVENous Q6H  
 cefTRIAXone (ROCEPHIN) 2 g in 0.9% sodium chloride (MBP/ADV) 50 mL  2 g IntraVENous Q24H  
 traMADol (ULTRAM) tablet 50 mg  50 mg Oral Q6H PRN  
 furosemide (LASIX) injection 40 mg  40 mg IntraVENous BID  lactulose (CHRONULAC) solution 30 g  30 g Oral TID  heparin (porcine) injection 5,000 Units  5,000 Units SubCUTAneous Q12H  ferrous sulfate tablet 325 mg  325 mg Oral ACB  gabapentin (NEURONTIN) capsule 400 mg  400 mg Oral TID  magnesium oxide (MAG-OX) tablet 400 mg  400 mg Oral DAILY  sodium chloride (NS) flush 5-40 mL  5-40 mL IntraVENous Q8H  
 sodium chloride (NS) flush 5-40 mL  5-40 mL IntraVENous PRN  
 acetaminophen (TYLENOL) tablet 650 mg  650 mg Oral Q4H PRN  
 ondansetron (ZOFRAN) injection 4 mg  4 mg IntraVENous Q4H PRN  
 magnesium hydroxide (MILK OF MAGNESIA) 400 mg/5 mL oral suspension 30 mL  30 mL Oral DAILY PRN  
 zolpidem (AMBIEN) tablet 5 mg  5 mg Oral QHS PRN Objective:  
 
Physical Exam: 
Vitals: 
Visit Vitals /63 (BP 1 Location: Right arm, BP Patient Position: At rest;Supine; Head of bed elevated (Comment degrees)) Pulse 89 Temp 99.1 °F (37.3 °C) Resp 16 Ht 5' 9\" (1.753 m) Wt 97.3 kg (214 lb 8 oz) SpO2 94% BMI 31.68 kg/m² General: No acute distress. Skin:  Extremities and face reveal no rashes. No russ erythema. No telangiectasias on the chest wall. HEENT: Sclerae anicteric. No oral ulcers. No abnormal pigmentation of the lips. The neck is supple. Cardiovascular: Regular rate and rhythm. No murmurs, gallops, or rubs. Respiratory:  Comfortable breathing  With no accessory muscle use. Clear breath sounds with no wheezes, rales, or rhonchi. GI:  Abdomen nondistended, soft, and nontender. Normal active bowel sounds. No enlargement of the liver or spleen. No masses palpable. Musculoskeletal:  No pitting edema of the lower legs. Extremities have good range of motion. Neurological:  Gross memory appears intact. Patient is alert and oriented. Psychiatric:  Mood appears appropriate with judgement intact. Lymphatic:  No cervical or supraclavicular adenopathy. Laboratory:   
Recent Labs  
  03/29/19 
3198 WBC 7.1 RBC 3.06* HGB 9.0*  
HCT 26.7*  
* Recent Labs  
  03/29/19 
4382 * *  
K 4.2 CL 89* CO2 24 BUN 27* CREA 1.59* CA 8.5 Recent Labs  
  03/28/19 
0639 03/26/19 
1444 PTP 17.2* 16.5* INR 1.4 1.4 APTT  --  37.0 Recent Labs  
  03/28/19 
0639  03/26/19 
1444 SGOT 56*   < > 56*    < > 105 ALB 3.1*   < > 2.7* TP 5.8*   < > 6.1*  
LPSE  --   --  253  
 < > = values in this interval not displayed. Assessment: A 72 y.o. male with Etoh cirrhosis here with recurrent ascites and hyponatremia Plan: 
  
 
Apparently paracentesis this afternoon Albumin with para Has been on abx for possible SBP Continue fluid restriction Appreciate renal input- continuing lasix Signed By: Ana Paula Do MD   
 March 29, 2019

## 2019-03-29 NOTE — PROGRESS NOTES
Hourly rounding completed. Patient rested throughout the night. Patient will be going to paracentesis today. All needs met at this time.

## 2019-03-29 NOTE — PROGRESS NOTES
[facility] Progress Note Admit Date:  3/26/2019 Today's Date:  3/29/2019 CC:  Cirrhosis and ascites Pt is off the floor for paracentesis. Svetlana Clark PA-C [facility]

## 2019-03-29 NOTE — PROGRESS NOTES
PT Daily Note: Attempted to see patient for physical therapy this afternoon but transport arrived to take patient to IR. Encouraged patient to ambulate to stretcher but patient stated, \"I'll never make it\". Assisted transporter with dependently sliding patient onto stretcher. Will check back on patient at a later date/time if schedule permits.  
Thank you, 
Hong Alarcon, PTA

## 2019-03-29 NOTE — PROGRESS NOTES
Initial visit by  to convey care and concern and encourage patient that  services are available if desired. Mr. Gali Mon was off the floor with his wife and daughter at bedside. No needs were voiced during the visit. Family already had a 's visitation card in their possession. Chaplains remain available for support. Kaitlin Taylor MDiv Board Certified [address] [place] [address]

## 2019-03-29 NOTE — PROGRESS NOTES
TRANSFER - IN REPORT: 
 
Verbal report received from IR(name) on Orlando Carballo  being received from Juan(unit) for routine progression of care Report consisted of patients Situation, Background, Assessment and  
Recommendations(SBAR). Information from the following report(s) Procedure Summary was reviewed with the receiving nurse. Opportunity for questions and clarification was provided. Assessment will be completed upon patients arrival to unit and care assumed.

## 2019-03-29 NOTE — PROGRESS NOTES
After pt received 50 mg tramadol, pt reported his pain level at 3 and said he didn't need any more pain pain medicine. 1 time doze of morphine 1 mg not given per Dr. Wander Byrd instructions. Will continue to monitor.

## 2019-03-29 NOTE — PROGRESS NOTES
Patient off the floor at the time of rounds. Na is slightly better at 121 today. Urine studies have not been obtained. Discussed with nursing. Continue fluid restriction for now.

## 2019-03-29 NOTE — PROGRESS NOTES
Hourly rounds completed throughout this shift. pt resting well after paracentesis. Pt resting in bed; denies needs at this time. Will continue to monitor and report to oncoming night shift nurse.

## 2019-03-29 NOTE — PROGRESS NOTES
Hospitalist Progress Note 3/29/2019 Admit Date: 3/26/2019  4:21 PM  
NAME: Yelena Joel :  1945 MRN:  [id number] Attending: Laurent Aguirre MD 
PCP:  Mariaa Arrington MD 
 
SUBJECTIVE:  
 
Yelena Joel is a 72years old male with pmhx of alcoholic cirrhosis requiring frequent paracentesis admitted on 3/26 for nausea/vomiting and abdominal pain. He was also found to have hypervolemic hypo-osmolar hyponatremia 119 and mild hyperkalemia. Pt had paracentesis done on 3/25, and received post paracentesis IV albumin. Pt was empirically started on IV rocephin. Fluid culture from 3/25 showed GPC and moderate streptococcus (beta hemolytic). 3/29: 
Pt seen at bedside Pt's wife also present Pt is feeling better with persistent abdominal distention and pedal edema. As per the pt, abdominal distention hasn't changed much but pedal edema is improving. No fever, chills Review of Systems negative with exception of pertinent positives noted above PHYSICAL EXAM  
 
 
Visit Vitals /68 (BP 1 Location: Left arm, BP Patient Position: At rest) Pulse 90 Temp 99.1 °F (37.3 °C) Resp 16 Ht 5' 9\" (1.753 m) Wt 97.3 kg (214 lb 8 oz) SpO2 93% BMI 31.68 kg/m² Temp (24hrs), Av.3 °F (36.8 °C), Min:97.5 °F (36.4 °C), Max:99.1 °F (37.3 °C) Oxygen Therapy O2 Sat (%): 93 % (19 0326) Pulse via Oximetry: 88 beats per minute (19) O2 Device: Room air (19 1544) No intake or output data in the 24 hours ending 19 7319 General: Alert, awake, NAD Head:  Atraumatic Normocephalic. Eyes:  PERRLA, EOMI, Anicteric. ENT:  No discharges/lesions. Lungs:  Bibasilar crackles, no wheezing CVS:  Regular rate and rhythm,  No murmur, rub, or gallop, No JVD, 1+ pitting LE edema Abdomen: Tense, distended, non tender, fluid thrill and shifting dullness ++ , BS normoactive MSK:  No deformities, lesions, Spontaneously moves extremities. Neurologic:  GCS 15, no motor or sensory deficits, CN 2-12 intact Psychiatry:      No anxiety/Depression Skin:   No rash/lesions. Good skin turgor Heme/Lymph/Immune:  No petechiae, ecchymoses, overt signs of bleeding or    lymphadenopathy noted. Recent Results (from the past 24 hour(s)) OSMOLALITY, SERUM/PLASMA Collection Time: 03/28/19  2:26 PM  
Result Value Ref Range Osmolality, serum/plasma 254 (L) 280 - 301 MOSM/kg H2O  
PLEASE READ & DOCUMENT PPD TEST IN 24 HRS Collection Time: 03/28/19  2:30 PM  
Result Value Ref Range PPD  Negative  
 mm Induration  0 - 5 mm CBC WITH AUTOMATED DIFF Collection Time: 03/29/19  5:26 AM  
Result Value Ref Range WBC 7.1 4.3 - 11.1 K/uL  
 RBC 3.06 (L) 4.23 - 5.6 M/uL HGB 9.0 (L) 13.6 - 17.2 g/dL HCT 26.7 (L) 41.1 - 50.3 % MCV 87.3 79.6 - 97.8 FL  
 MCH 29.4 26.1 - 32.9 PG  
 MCHC 33.7 31.4 - 35.0 g/dL  
 RDW 15.2 (H) 11.9 - 14.6 % PLATELET 133 (L) 862 - 450 K/uL MPV 10.5 9.4 - 12.3 FL ABSOLUTE NRBC 0.00 0.0 - 0.2 K/uL  
 DF AUTOMATED NEUTROPHILS 65 43 - 78 % LYMPHOCYTES 6 (L) 13 - 44 % MONOCYTES 23 (H) 4.0 - 12.0 % EOSINOPHILS 3 0.5 - 7.8 % BASOPHILS 0 0.0 - 2.0 % IMMATURE GRANULOCYTES 2 0.0 - 5.0 %  
 ABS. NEUTROPHILS 4.6 1.7 - 8.2 K/UL  
 ABS. LYMPHOCYTES 0.5 0.5 - 4.6 K/UL  
 ABS. MONOCYTES 1.7 (H) 0.1 - 1.3 K/UL  
 ABS. EOSINOPHILS 0.2 0.0 - 0.8 K/UL  
 ABS. BASOPHILS 0.0 0.0 - 0.2 K/UL  
 ABS. IMM. GRANS. 0.1 0.0 - 0.5 K/UL METABOLIC PANEL, BASIC Collection Time: 03/29/19  5:26 AM  
Result Value Ref Range Sodium 121 (LL) 136 - 145 mmol/L Potassium 4.2 3.5 - 5.1 mmol/L Chloride 89 (L) 98 - 107 mmol/L  
 CO2 24 21 - 32 mmol/L Anion gap 8 7 - 16 mmol/L Glucose 109 (H) 65 - 100 mg/dL BUN 27 (H) 8 - 23 MG/DL Creatinine 1.59 (H) 0.8 - 1.5 MG/DL  
 GFR est AA 55 (L) >60 ml/min/1.73m2 GFR est non-AA 46 (L) >60 ml/min/1.73m2  Calcium 8.5 8.3 - 10.4 MG/DL  
 
 
 
 Imaging Dahlia Rust All diagnostic imaging personally reviewed by me. CTAP: 
IMPRESSION: 
1. Macronodular cirrhosis with sequela of portal hypertension with mild 
splenomegaly and with perigastric and periesophageal varices evident. Large 
volume of intra-abdominal ascites. 2. Moderately sized left-sided pleural effusion. 3. 1.9 cm intermediate density lesion of the left kidney which is exactly 
characterize recommend follow-up with MRI or renal mass CT. 4. Diffuse wall thickening of the ascending and transverse colon and mild wall 
thickening of the small bowel favored related to fluid status and cirrhosis EXAM: 2 view chest radiograph. ADDITIONAL CLINICAL INFORMATION: 72year-old cough COMPARISON: Chest radiograph dated December 28, 2018. 
  
FINDINGS: 
Mild left basilar atelectasis. No pneumothorax. No pleural effusion. The heart, 
mediastinum, john, and pulmonary vasculature are within normal limits. No 
evidence of acute osseous abnormality. 
  
IMPRESSION IMPRESSION:  
1. Mild left basilar atelectasis without a focal consolidation to suggest 
pneumonia. 
  
ASSESSMENT Hospital Problems as of 3/29/2019 Date Reviewed: 2/26/2019 Codes Class Noted - Resolved POA Leukocytosis ICD-10-CM: A80.042 ICD-9-CM: 288.60  3/26/2019 - Present Yes  
   
 SBP (spontaneous bacterial peritonitis) ([facility] [place] [facility] ICD-10-CM: X07.4 ICD-9-CM: 567.23  3/26/2019 - Present Yes Nausea & vomiting ICD-10-CM: R11.2 ICD-9-CM: 787.01  3/26/2019 - Present Yes Hyperkalemia ICD-10-CM: E87.5 ICD-9-CM: 276.7  3/26/2019 - Present Yes NATE (acute kidney injury) ([facility] [place] [facility] ICD-10-CM: N17.9 ICD-9-CM: 584.9  3/26/2019 - Present Yes Normocytic anemia ICD-10-CM: D64.9 ICD-9-CM: 285.9  3/26/2019 - Present Yes * (Principal) Hyponatremia ICD-10-CM: E87.1 ICD-9-CM: 276.1  12/28/2018 - Present Yes Essential hypertension ICD-10-CM: I10 
ICD-9-CM: 401.9  10/30/2018 - Present Yes Alcoholic cirrhosis of liver with ascites ([facility] [address] [facility] ICD-10-CM: K70.31 ICD-9-CM: 571.2  10/8/2018 - Present Yes Esophageal varices without bleeding (HCC) ICD-10-CM: I85.00 ICD-9-CM: 456.1  10/8/2018 - Present Yes Plan: 
- ascitic fluid culture positive for Streptococcus Salivarius - IV rocephin, D2. Will switch to oral clindamycin  
- ascitic fluid chemistry with Polys < 128 
- Hyponatremia: 121, gradually improving - Creatinine is trending down, 1.59 << 1.60 
- pt will need Paracentesis once Na improves 
- lasix and albumin on hold for now 
- holding aldactone in view of hyperkalemia - plan for TIPS as outpatient by Dr. Jeff Zarate for recurrent ascites 
- lactulose to prevent constipation 
- monitor BMP q8h 
- continue other home meds as reconciled in [facility] 
- PT/OT eval  
 
 
DVT Prophylaxis: DNR/DNI Dispo: pending clinical course Pt will need STR on discharge Karlene Harrison MD

## 2019-03-29 NOTE — PROGRESS NOTES
TRANSFER - OUT REPORT: 
 
Verbal report given to Jerald Thomas RN on [place]  being transferred to 6th floor for routine post - op Report consisted of patients Situation, Background, Assessment and  
Recommendations(SBAR). Information from the following report(s) SBAR, Kardex, Procedure Summary and MAR was reviewed with the receiving nurse. Lines:  
Peripheral IV 03/29/19 Right Forearm (Active) Site Assessment Clean, dry, & intact 3/29/2019  7:00 AM  
Phlebitis Assessment 0 3/29/2019  7:00 AM  
Infiltration Assessment 0 3/29/2019  7:00 AM  
Dressing Status Clean, dry, & intact 3/29/2019  7:00 AM  
Dressing Type Tape;Transparent 3/29/2019  7:00 AM  
Hub Color/Line Status Flushed;Patent 3/29/2019  7:00 AM  
Alcohol Cap Used No 3/29/2019  7:00 AM  
  
 
Opportunity for questions and clarification was provided.

## 2019-03-29 NOTE — INTERDISCIPLINARY ROUNDS
Interdisciplinary Rounds completed 03/29/19. Nursing, Case Management, Physician and PT present. 
  
Plan of care reviewed and updated. Pt unsteady on feet, STR at discharge.

## 2019-03-30 NOTE — PROGRESS NOTES
. 
[facility] Progress Note Date: 3/30/2019 GI Problem: Etoh cirrhosis History of Present Illness:  Patient is a 72 y.o. male who is seen in consultation for Etoh cirrhosis. His Na is up to 126. His cre is 1.55. Abstinent since July 2018. His MELD is 25. He is hesitant to consider liver transplant, but hie family wants him to. He has had confusion and hallucinations. He had a paracentesis yesterday removing almost 8 L. His abdomen feels a lot better. He is unable to walk. He has no appetite. He is depressed. Hospital Medications: 
Current Facility-Administered Medications Medication Dose Route Frequency  rifAXIMin (XIFAXAN) tablet 550 mg  550 mg Oral BID  albumin human 25% (BUMINATE) solution 12.5 g  12.5 g IntraVENous Q6H  
 cefTRIAXone (ROCEPHIN) 2 g in 0.9% sodium chloride (MBP/ADV) 50 mL  2 g IntraVENous Q24H  
 traMADol (ULTRAM) tablet 50 mg  50 mg Oral Q6H PRN  
 furosemide (LASIX) injection 40 mg  40 mg IntraVENous BID  heparin (porcine) injection 5,000 Units  5,000 Units SubCUTAneous Q12H  ferrous sulfate tablet 325 mg  325 mg Oral ACB  gabapentin (NEURONTIN) capsule 400 mg  400 mg Oral TID  magnesium oxide (MAG-OX) tablet 400 mg  400 mg Oral DAILY  sodium chloride (NS) flush 5-40 mL  5-40 mL IntraVENous Q8H  
 sodium chloride (NS) flush 5-40 mL  5-40 mL IntraVENous PRN  
 acetaminophen (TYLENOL) tablet 650 mg  650 mg Oral Q4H PRN  
 ondansetron (ZOFRAN) injection 4 mg  4 mg IntraVENous Q4H PRN  
 magnesium hydroxide (MILK OF MAGNESIA) 400 mg/5 mL oral suspension 30 mL  30 mL Oral DAILY PRN  
 zolpidem (AMBIEN) tablet 5 mg  5 mg Oral QHS PRN Objective:  
 
Physical Exam: 
Vitals: 
Visit Vitals /63 Pulse 84 Temp 98.2 °F (36.8 °C) Resp 17 Ht 5' 9\" (1.753 m) Wt 89.1 kg (196 lb 8 oz) SpO2 95% BMI 29.02 kg/m² General: No acute distress. Skin:  Extremities and face reveal no rashes. No russ erythema. No telangiectasias on the chest wall. HEENT: Sclerae anicteric. No oral ulcers. No abnormal pigmentation of the lips. The neck is supple. Cardiovascular: Regular rate and rhythm. No murmurs, gallops, or rubs. Respiratory:  Comfortable breathing  With no accessory muscle use. Clear breath sounds with no wheezes, rales, or rhonchi. GI:  Abdomen nondistended, soft, and nontender. Normal active bowel sounds. No enlargement of the liver or spleen. No masses palpable. Musculoskeletal:  No pitting edema of the lower legs. Extremities have good range of motion. Neurological:  Gross memory appears intact. Patient is alert and oriented. Psychiatric:  Mood appears appropriate with judgement intact. Lymphatic:  No cervical or supraclavicular adenopathy. Laboratory:   
Recent Labs  
  03/30/19 
0458 WBC 6.3  
RBC 3.12* HGB 9.4* HCT 27.2*  
* Recent Labs  
  03/30/19 
0458 * *  
K 3.5 CL 90* CO2 25 BUN 26* CREA 1.55* CA 8.5 Recent Labs  
  03/28/19 
2696 PTP 17.2* INR 1.4 Recent Labs  
  03/28/19 
3461 SGOT 56*  ALB 3.1* TP 5.8* Assessment: A 72 y.o. male with Etoh cirrhosis, hyponatremia, MELD 25, and ascites. Plan: 
  
 
Continue fluid restriction, follow Na Check NH3, start Xifaxan 550 bid, no lactulose because having diarrhea Needs to discuss liver transplant with Mr. Grady Jaimes at [facility] hepatology Start lexapro Supplement drinks PT Signed By: Hayley Armando MD   
 March 30, 2019

## 2019-03-30 NOTE — PROGRESS NOTES
Hospitalist Progress Note 3/30/2019 Admit Date: 3/26/2019  4:21 PM  
NAME: Leonid Corral :  1945 MRN:  [id number] Attending: Audra San MD 
PCP:  Tiffanie Alex MD 
 
SUBJECTIVE:  
 
Leonid Corral is a 72years old male with pmhx of alcoholic cirrhosis requiring frequent paracentesis admitted on 3/26 for nausea/vomiting and abdominal pain. He was also found to have hypervolemic hypo-osmolar hyponatremia 119 and mild hyperkalemia. Pt had paracentesis done on 3/25, and received post paracentesis IV albumin. Pt was empirically started on IV rocephin. Fluid culture from 3/25 showed GPC and moderate streptococcus (beta hemolytic). 3/30: 
Pt seen at bedside Multiple family members present at bedside too. Pt reports feeling okay, and he does not want to go for liver transplant in future. Family reports having concerns for pt being depressed and sightly confused. No abdominal pain, nausea/vomiting, fever, chills, diarrhea. Review of Systems negative with exception of pertinent positives noted above PHYSICAL EXAM  
 
 
Visit Vitals /63 Pulse 84 Temp 98.2 °F (36.8 °C) Resp 17 Ht 5' 9\" (1.753 m) Wt 89.1 kg (196 lb 8 oz) SpO2 95% BMI 29.02 kg/m² Temp (24hrs), Av °F (36.7 °C), Min:97.6 °F (36.4 °C), Max:98.5 °F (36.9 °C) Oxygen Therapy O2 Sat (%): 95 % (19 0721) Pulse via Oximetry: 88 beats per minute (19) O2 Device: Room air (19 0720) Intake/Output Summary (Last 24 hours) at 3/30/2019 6498 Last data filed at 3/30/2019 0447 Gross per 24 hour Intake 1050 ml Output 7900 ml Net -6850 ml General: Alert, awake, NAD Head:  Atraumatic Normocephalic. Eyes:  PERRLA, EOMI, Anicteric. ENT:  No discharges/lesions. Lungs:  Bibasilar crackles, no wheezing CVS:  Regular rate and rhythm,  No murmur, rub, or gallop, No JVD, 1+ pitting LE edema Abdomen: Tense, distended, non tender, BS normoactive MSK:  No deformities, lesions, Spontaneously moves extremities. Neurologic:  GCS 15, no motor or sensory deficits, CN 2-12 intact Psychiatry:      No anxiety/Depression Skin:   No rash/lesions. Good skin turgor Heme/Lymph/Immune:  No petechiae, ecchymoses, overt signs of bleeding or    lymphadenopathy noted. Recent Results (from the past 24 hour(s)) PLEASE READ & DOCUMENT PPD TEST IN 48 HRS Collection Time: 03/29/19  2:11 PM  
Result Value Ref Range PPD  Negative  
 mm Induration  0 - 5 mm CBC WITH AUTOMATED DIFF Collection Time: 03/30/19  4:58 AM  
Result Value Ref Range WBC 6.3 4.3 - 11.1 K/uL  
 RBC 3.12 (L) 4.23 - 5.6 M/uL HGB 9.4 (L) 13.6 - 17.2 g/dL HCT 27.2 (L) 41.1 - 50.3 % MCV 87.2 79.6 - 97.8 FL  
 MCH 30.1 26.1 - 32.9 PG  
 MCHC 34.6 31.4 - 35.0 g/dL  
 RDW 15.2 (H) 11.9 - 14.6 % PLATELET 621 (L) 026 - 450 K/uL MPV 10.1 9.4 - 12.3 FL ABSOLUTE NRBC 0.00 0.0 - 0.2 K/uL  
 DF AUTOMATED NEUTROPHILS 66 43 - 78 % LYMPHOCYTES 6 (L) 13 - 44 % MONOCYTES 24 (H) 4.0 - 12.0 % EOSINOPHILS 2 0.5 - 7.8 % BASOPHILS 0 0.0 - 2.0 % IMMATURE GRANULOCYTES 1 0.0 - 5.0 %  
 ABS. NEUTROPHILS 4.2 1.7 - 8.2 K/UL  
 ABS. LYMPHOCYTES 0.4 (L) 0.5 - 4.6 K/UL  
 ABS. MONOCYTES 1.5 (H) 0.1 - 1.3 K/UL  
 ABS. EOSINOPHILS 0.2 0.0 - 0.8 K/UL  
 ABS. BASOPHILS 0.0 0.0 - 0.2 K/UL  
 ABS. IMM. GRANS. 0.1 0.0 - 0.5 K/UL Imaging Lisbet Hunter All diagnostic imaging personally reviewed by me. CTAP: 
IMPRESSION: 
1. Macronodular cirrhosis with sequela of portal hypertension with mild 
splenomegaly and with perigastric and periesophageal varices evident. Large 
volume of intra-abdominal ascites. 2. Moderately sized left-sided pleural effusion. 3. 1.9 cm intermediate density lesion of the left kidney which is exactly 
characterize recommend follow-up with MRI or renal mass CT. 4. Diffuse wall thickening of the ascending and transverse colon and mild wall thickening of the small bowel favored related to fluid status and cirrhosis EXAM: 2 view chest radiograph. ADDITIONAL CLINICAL INFORMATION: 72year-old cough COMPARISON: Chest radiograph dated December 28, 2018. 
  
FINDINGS: 
Mild left basilar atelectasis. No pneumothorax. No pleural effusion. The heart, 
mediastinum, john, and pulmonary vasculature are within normal limits. No 
evidence of acute osseous abnormality. 
  
IMPRESSION IMPRESSION:  
1. Mild left basilar atelectasis without a focal consolidation to suggest 
pneumonia. 
  
ASSESSMENT Hospital Problems as of 3/30/2019 Date Reviewed: 2/26/2019 Codes Class Noted - Resolved POA Leukocytosis ICD-10-CM: O65.668 ICD-9-CM: 288.60  3/26/2019 - Present Yes  
   
 SBP (spontaneous bacterial peritonitis) ([facility] ICD-10-CM: T64.0 ICD-9-CM: 567.23  3/26/2019 - Present Yes Nausea & vomiting ICD-10-CM: R11.2 ICD-9-CM: 787.01  3/26/2019 - Present Yes Hyperkalemia ICD-10-CM: E87.5 ICD-9-CM: 276.7  3/26/2019 - Present Yes NATE (acute kidney injury) ([facility] ICD-10-CM: N17.9 ICD-9-CM: 584.9  3/26/2019 - Present Yes Normocytic anemia ICD-10-CM: D64.9 ICD-9-CM: 285.9  3/26/2019 - Present Yes * (Principal) Hyponatremia ICD-10-CM: E87.1 ICD-9-CM: 276.1  12/28/2018 - Present Yes Essential hypertension ICD-10-CM: I10 
ICD-9-CM: 401.9  10/30/2018 - Present Yes Alcoholic cirrhosis of liver with ascites ([facility] ICD-10-CM: K70.31 ICD-9-CM: 571.2  10/8/2018 - Present Yes Esophageal varices without bleeding (HCC) ICD-10-CM: I85.00 ICD-9-CM: 456.1  10/8/2018 - Present Yes Plan: 
- pt is clinically doing okay, has lethargy and fatigue. Pt's family reports that pt is slightly confused. 
- NH3 is 33. Continue Xifaxin 
- started on Lexapro for depression 
- discussed about outpatient follow up with Dr. Fahad Devries to evaluate for TIPS/liver transplant - ascitic fluid culture positive for Streptococcus Salivarius - IV rocephin for 1 more day - Hyponatremia: 126, improved - Creatinine is trending down, 1.56 << 1.59 << 1.60 
- s/p paracentesis on 3/29 with removal of 8 ltrs 
- lasix and albumin on hold for now 
- holding aldactone in view of hyperkalemia 
- monitor BMP q8h 
- continue other home meds as reconciled in [place] 
- PT/OT eval  
 
 
DVT Prophylaxis: DNR/DNI Dispo: pending clinical course Pt will need STR on discharge Jaime Hitchcock MD

## 2019-03-30 NOTE — PROGRESS NOTES
Patient rested throughout night. Patient voiding and having loose BM. Family at bedside. Hourly rounds performed this shift, needs met at this time. Bed in low/locked position and call light within reach. Will continue to monitor and give bedside report to oncoming nurse.

## 2019-03-30 NOTE — PROGRESS NOTES
[address] Nephrology Progress Note Follow-Up on: NATE, hyponatremia ROS: 
Gen - no fever, no chills, appetite unchanged CV - no chest pain, no palpitation Lung - no shortness of breath, no cough Abd - no tenderness, no nausea/vomiting, no diarrhea Ext - + edema Exam: 
Vitals:  
 03/29/19 9275 03/30/19 0423 03/30/19 0522 03/30/19 4858 BP: 103/63 100/67  100/63 Pulse: 92 90  84 Resp: 18 19 17 Temp: 98.5 °F (36.9 °C) 97.7 °F (36.5 °C)  98.2 °F (36.8 °C) SpO2: 94% 95%  95% Weight:   89.1 kg (196 lb 8 oz) Height:      
 
 
 
Intake/Output Summary (Last 24 hours) at 3/30/2019 1129 Last data filed at 3/30/2019 3142 Gross per 24 hour Intake 1050 ml Output 7900 ml Net -6850 ml Wt Readings from Last 3 Encounters:  
03/30/19 89.1 kg (196 lb 8 oz) 02/26/19 87.1 kg (192 lb)  
02/21/19 90.7 kg (200 lb) GEN - in no distress CV - regular, 2/6 murmur, no rub Lung - clear bilaterally Abd - soft, nontender Ext - trace-1+ edema Recent Labs  
  03/30/19 
0458 03/29/19 
0526 03/28/19 
9509 WBC 6.3 7.1 7.9 HGB 9.4* 9.0* 9.4* HCT 27.2* 26.7* 27.5*  
* 136* 138* INR  --   --  1.4 Recent Labs  
  03/30/19 
0458 03/29/19 
0526 03/28/19 
8897 * 121* 119*  
K 3.5 4.2 4.7 CL 90* 89* 89* CO2 25 24 24 BUN 26* 27* 29* CREA 1.55* 1.59* 1.60* CA 8.5 8.5 8.4 * 109* 97 Assessment / Plan: 
Principal Problem: Hyponatremia (12/28/2018) Active Problems: 
  Alcoholic cirrhosis of liver with ascites ([address] (10/8/2018) Esophageal varices without bleeding ([facility] (10/8/2018) Essential hypertension (10/30/2018) Leukocytosis (3/26/2019) SBP (spontaneous bacterial peritonitis) ([facility] [facility] (3/26/2019) Nausea & vomiting (3/26/2019) Hyperkalemia (3/26/2019) NATE (acute kidney injury) ([facility] [address] [facility] (3/26/2019) Normocytic anemia (3/26/2019) 1. NATE 
- Cr currently stable, slight trend improvement 2. Hyponatremia - improving with fluid restriction 3. Cirrhosis with ascites - recent paracentesis

## 2019-03-30 NOTE — PROGRESS NOTES
Problem: Falls - Risk of 
Goal: *Absence of Falls Description Document Chanithierno Matiasw Fall Risk and appropriate interventions in the flowsheet.  
Outcome: Progressing Towards Goal

## 2019-03-31 NOTE — PROGRESS NOTES
. 
[facility] Progress Note Date: 3/31/2019 GI Problem: Etoh cirrhosis, hyponatremia, MELD 25, and ascites History of Present Illness:  Patient is a 72 y.o. male who is seen in consultation for Etoh cirrhosis, hyponatremia, MELD 25, and ascites. Yesterday his Na dropped back down to 122, but today it is a little better at 123. Cre is 1.45. NH3 was 33. His main complaint is that he cannot walk due to weakness. Hospital Medications: 
Current Facility-Administered Medications Medication Dose Route Frequency  rifAXIMin (XIFAXAN) tablet 550 mg  550 mg Oral BID  escitalopram oxalate (LEXAPRO) tablet 10 mg  10 mg Oral DAILY  albumin human 25% (BUMINATE) solution 12.5 g  12.5 g IntraVENous Q6H  
 cefTRIAXone (ROCEPHIN) 2 g in 0.9% sodium chloride (MBP/ADV) 50 mL  2 g IntraVENous Q24H  
 traMADol (ULTRAM) tablet 50 mg  50 mg Oral Q6H PRN  
 furosemide (LASIX) injection 40 mg  40 mg IntraVENous BID  heparin (porcine) injection 5,000 Units  5,000 Units SubCUTAneous Q12H  ferrous sulfate tablet 325 mg  325 mg Oral ACB  gabapentin (NEURONTIN) capsule 400 mg  400 mg Oral TID  magnesium oxide (MAG-OX) tablet 400 mg  400 mg Oral DAILY  sodium chloride (NS) flush 5-40 mL  5-40 mL IntraVENous Q8H  
 sodium chloride (NS) flush 5-40 mL  5-40 mL IntraVENous PRN  
 acetaminophen (TYLENOL) tablet 650 mg  650 mg Oral Q4H PRN  
 ondansetron (ZOFRAN) injection 4 mg  4 mg IntraVENous Q4H PRN  
 magnesium hydroxide (MILK OF MAGNESIA) 400 mg/5 mL oral suspension 30 mL  30 mL Oral DAILY PRN  
 zolpidem (AMBIEN) tablet 5 mg  5 mg Oral QHS PRN Objective:  
 
Physical Exam: 
Vitals: 
Visit Vitals /68 (BP 1 Location: Left arm, BP Patient Position: At rest) Pulse 82 Temp 98.1 °F (36.7 °C) Resp 19 Ht 5' 9\" (1.753 m) Wt 89.6 kg (197 lb 9.6 oz) SpO2 93% BMI 29.18 kg/m² General: No acute distress. Skin:  Extremities and face reveal no rashes. No russ erythema. No telangiectasias on the chest wall. HEENT: Sclerae anicteric. No oral ulcers. No abnormal pigmentation of the lips. The neck is supple. Cardiovascular: Regular rate and rhythm. No murmurs, gallops, or rubs. Respiratory:  Comfortable breathing  With no accessory muscle use. Clear breath sounds with no wheezes, rales, or rhonchi. GI:  Abdomen nondistended, soft, and nontender. Normal active bowel sounds. No enlargement of the liver or spleen. No masses palpable. Musculoskeletal:  No pitting edema of the lower legs. Extremities have good range of motion. Neurological:  Gross memory appears intact. Patient is alert and oriented. Psychiatric:  Mood appears appropriate with judgement intact. Lymphatic:  No cervical or supraclavicular adenopathy. Laboratory:   
Recent Labs  
  03/30/19 
0458 WBC 6.3  
RBC 3.12* HGB 9.4* HCT 27.2*  
* Recent Labs  
  03/31/19 
0350 * * K 3.6 CL 88* CO2 26 BUN 27* CREA 1.45  
CA 8.2* No results for input(s): PTP, INR, APTT in the last 72 hours. No lab exists for component: INREXT No results for input(s): TBIL, CBIL, SGOT, GPT, AP, ALB, TP, AML, LPSE in the last 72 hours. Assessment: A 72 y.o. male with Etoh cirrhosis, hyponatremia, MELD 25, and ascites. Plan: I stressed to him the importance of the fluid restriction PT should see him tomorrow He is on Xifaxan but not lactulose due to the recent diarrhea He is fully aware that he needs a liver transplant, and he is plugged into the [facility] hepatology clinic Signed By: Adriana Gomez MD   
 March 31, 2019

## 2019-03-31 NOTE — PROGRESS NOTES
[facility] Progress Note Follow-Up on: NATE, hyponatremia ROS: 
Gen - no fever, no chills, appetite unchanged CV - no chest pain, no palpitation Lung - no shortness of breath, no cough Abd - no tenderness, no nausea/vomiting, no diarrhea Ext - + edema Exam: 
Vitals:  
 03/30/19 1955 03/31/19 4637 03/31/19 0448 03/31/19 7528 BP: 111/66 102/68 110/68 103/65 Pulse: 78 81 82 78 Resp: 18 18 19 18 Temp: 98 °F (36.7 °C) 98 °F (36.7 °C) 98.1 °F (36.7 °C) 97.8 °F (36.6 °C) SpO2: 90% 92% 93% 95% Weight:   89.6 kg (197 lb 9.6 oz) Height:      
 
 
 
Intake/Output Summary (Last 24 hours) at 3/31/2019 1158 Last data filed at 3/31/2019 6779 Gross per 24 hour Intake 1068 ml Output 300 ml Net 768 ml Wt Readings from Last 3 Encounters:  
03/31/19 89.6 kg (197 lb 9.6 oz) 02/26/19 87.1 kg (192 lb)  
02/21/19 90.7 kg (200 lb) GEN - in no distress CV - regular, 2/6 murmur, no rub Lung - clear bilaterally Abd - soft, nontender Ext - 2++ edema Recent Labs  
  03/30/19 
0458 03/29/19 
0694 WBC 6.3 7.1 HGB 9.4* 9.0*  
HCT 27.2* 26.7*  
* 136* Recent Labs  
  03/31/19 
0905 03/31/19 
0350 03/30/19 
2031 * 123* 122* K 3.6 3.6 3.7 CL 85* 88* 87* CO2 25 26 28 BUN 27* 27* 26* CREA 1.62* 1.45 1.65* CA 8.2* 8.2* 8.2*  
* 115* 133* Assessment / Plan: 
Principal Problem: Hyponatremia (12/28/2018) Active Problems: 
  Alcoholic cirrhosis of liver with ascites ([facility] (10/8/2018) Esophageal varices without bleeding ([address] (10/8/2018) Essential hypertension (10/30/2018) Leukocytosis (3/26/2019) SBP (spontaneous bacterial peritonitis) ([address] (3/26/2019) Nausea & vomiting (3/26/2019) Hyperkalemia (3/26/2019) NATE (acute kidney injury) ([facility] (3/26/2019) Normocytic anemia (3/26/2019) 1. NATE 
- Cr was improving until 9 AM labs when Cr higher - Suspect component of HRS 
 - Also getting Lasix for edema 
- Consider starting midodrine/octreotide - Not a candidate for tolvaptan due to cirrhosis 2. Hyponatremia - worse this AM.  Supposed to be on fluid restriction but family does not think it is strict, been getting plenty of fluids, ice, protein supplement - Spoke with nurse to make sure fluid restriction followed 3. Cirrhosis with ascites - recent paracentesis 3/29 Pt mentioned some numbness on his left side, maybe speech more slurred but hard for me to tell as his speech seems the same to me but this was relayed to hospitalist

## 2019-03-31 NOTE — PROGRESS NOTES
Problem: Falls - Risk of 
Goal: *Absence of Falls Description Document Latanyathierno Jad Fall Risk and appropriate interventions in the flowsheet. Outcome: Progressing Towards Goal 
  
Problem: Pain Goal: *Control of Pain Outcome: Progressing Towards Goal

## 2019-03-31 NOTE — PROGRESS NOTES
Hospitalist Progress Note 3/31/2019 Admit Date: 3/26/2019  4:21 PM  
NAME: Orlando Carballo :  1945 MRN:  [id number] Attending: Jarod Casillas MD 
PCP:  Wilson Ray MD 
 
SUBJECTIVE:  
 
Orlando Carballo is a 72years old male with pmhx of alcoholic cirrhosis requiring frequent paracentesis admitted on 3/26 for nausea/vomiting and abdominal pain. He was also found to have hypervolemic hypo-osmolar hyponatremia 119 and mild hyperkalemia. Pt had paracentesis done on 3/25, and received post paracentesis IV albumin. Pt was empirically started on IV rocephin. Fluid culture from 3/25 showed GPC and moderate streptococcus (beta hemolytic). 3/31: 
Pt seen at bedside. Sleeping comfortably, easy to arouse Pt's wife is also present at bedside Pt is sad about not being able to walk and reports weakness/lethargy No abdominal pain, nausea/vomiting, fever, chills, diarrhea. Review of Systems negative with exception of pertinent positives noted above PHYSICAL EXAM  
 
 
Visit Vitals /65 Pulse 78 Temp 97.8 °F (36.6 °C) Resp 18 Ht 5' 9\" (1.753 m) Wt 89.6 kg (197 lb 9.6 oz) SpO2 95% BMI 29.18 kg/m² Temp (24hrs), Av.9 °F (36.6 °C), Min:97.8 °F (36.6 °C), Max:98.1 °F (36.7 °C) Oxygen Therapy O2 Sat (%): 95 % (19 0751) Pulse via Oximetry: 88 beats per minute (19) O2 Device: Room air (19 0720) Intake/Output Summary (Last 24 hours) at 3/31/2019 0750 Last data filed at 3/31/2019 1990 Gross per 24 hour Intake 828 ml Output 300 ml Net 528 ml General: Sleeping, easy to arouse, NAD Head:  Atraumatic Normocephalic. Eyes:  PERRLA, EOMI, Anicteric. ENT:  No discharges/lesions. Lungs:  CTAB, no wheezing CVS:  Regular rate and rhythm,  No murmur, rub, or gallop, No JVD, 1+ pitting LE edema Abdomen: Tense, distended, non tender, BS normoactive MSK:  No deformities, lesions, Spontaneously moves extremities. Neurologic:  GCS 15, no motor or sensory deficits, CN 2-12 intact Psychiatry:      No anxiety/Depression Skin:   No rash/lesions. Good skin turgor Heme/Lymph/Immune:  No petechiae, ecchymoses, overt signs of bleeding or    lymphadenopathy noted. Recent Results (from the past 24 hour(s)) AMMONIA Collection Time: 03/30/19 10:24 AM  
Result Value Ref Range Ammonia 33 (H) 11 - 32 UMOL/L  
OSMOLALITY, UR Collection Time: 03/30/19  2:26 PM  
Result Value Ref Range Osmolality,urine 304 50 - 1,400 MOSM/kg H2O  
SODIUM, UR, RANDOM Collection Time: 03/30/19  2:26 PM  
Result Value Ref Range Sodium,urine random 34 MMOL/L  
METABOLIC PANEL, BASIC Collection Time: 03/30/19  8:31 PM  
Result Value Ref Range Sodium 122 (LL) 136 - 145 mmol/L Potassium 3.7 3.5 - 5.1 mmol/L Chloride 87 (L) 98 - 107 mmol/L  
 CO2 28 21 - 32 mmol/L Anion gap 7 7 - 16 mmol/L Glucose 133 (H) 65 - 100 mg/dL BUN 26 (H) 8 - 23 MG/DL Creatinine 1.65 (H) 0.8 - 1.5 MG/DL  
 GFR est AA 53 (L) >60 ml/min/1.73m2 GFR est non-AA 44 (L) >60 ml/min/1.73m2 Calcium 8.2 (L) 8.3 - 75.0 MG/DL  
METABOLIC PANEL, BASIC Collection Time: 03/31/19  3:50 AM  
Result Value Ref Range Sodium 123 (LL) 136 - 145 mmol/L Potassium 3.6 3.5 - 5.1 mmol/L Chloride 88 (L) 98 - 107 mmol/L  
 CO2 26 21 - 32 mmol/L Anion gap 9 7 - 16 mmol/L Glucose 115 (H) 65 - 100 mg/dL BUN 27 (H) 8 - 23 MG/DL Creatinine 1.45 0.8 - 1.5 MG/DL  
 GFR est AA >60 >60 ml/min/1.73m2 GFR est non-AA 51 (L) >60 ml/min/1.73m2 Calcium 8.2 (L) 8.3 - 10.4 MG/DL Imaging Annel Osorio [facility] All diagnostic imaging personally reviewed by me. CTAP: 
IMPRESSION: 
1. Macronodular cirrhosis with sequela of portal hypertension with mild 
splenomegaly and with perigastric and periesophageal varices evident. Large 
volume of intra-abdominal ascites. 2. Moderately sized left-sided pleural effusion. 3. 1.9 cm intermediate density lesion of the left kidney which is exactly 
characterize recommend follow-up with MRI or renal mass CT. 4. Diffuse wall thickening of the ascending and transverse colon and mild wall 
thickening of the small bowel favored related to fluid status and cirrhosis EXAM: 2 view chest radiograph. ADDITIONAL CLINICAL INFORMATION: 72year-old cough COMPARISON: Chest radiograph dated December 28, 2018. 
  
FINDINGS: 
Mild left basilar atelectasis. No pneumothorax. No pleural effusion. The heart, 
mediastinum, jhon, and pulmonary vasculature are within normal limits. No 
evidence of acute osseous abnormality. 
  
IMPRESSION IMPRESSION:  
1. Mild left basilar atelectasis without a focal consolidation to suggest 
pneumonia. 
  
ASSESSMENT Hospital Problems as of 3/31/2019 Date Reviewed: 2/26/2019 Codes Class Noted - Resolved POA Leukocytosis ICD-10-CM: U59.940 ICD-9-CM: 288.60  3/26/2019 - Present Yes  
   
 SBP (spontaneous bacterial peritonitis) ([facility] ICD-10-CM: B10.7 ICD-9-CM: 567.23  3/26/2019 - Present Yes Nausea & vomiting ICD-10-CM: R11.2 ICD-9-CM: 787.01  3/26/2019 - Present Yes Hyperkalemia ICD-10-CM: E87.5 ICD-9-CM: 276.7  3/26/2019 - Present Yes NATE (acute kidney injury) ([facility] ICD-10-CM: N17.9 ICD-9-CM: 584.9  3/26/2019 - Present Yes Normocytic anemia ICD-10-CM: D64.9 ICD-9-CM: 285.9  3/26/2019 - Present Yes * (Principal) Hyponatremia ICD-10-CM: E87.1 ICD-9-CM: 276.1  12/28/2018 - Present Yes Essential hypertension ICD-10-CM: I10 
ICD-9-CM: 401.9  10/30/2018 - Present Yes Alcoholic cirrhosis of liver with ascites ([facility] ICD-10-CM: K70.31 ICD-9-CM: 571.2  10/8/2018 - Present Yes Esophageal varices without bleeding ([facility]) ICD-10-CM: I85.00 ICD-9-CM: 456.1  10/8/2018 - Present Yes Plan: - Hyponatremia : difficult to correct, decreased to 120 from 123 Likely from early paracentesis - Continue Xifaxin 
- continue Lexapro for depression 
- discussed about outpatient follow up with Dr. Tex Santillan to evaluate for TIPS/liver transplant 
- ascitic fluid culture positive for Streptococcus Salivarius - IV rocephin, to be stopped on 4/1/19 
- Creatinine worse from 1.45 to 1.62 
- s/p paracentesis on 3/29 with removal of 8 ltrs 
- holding aldactone in view of hyperkalemia 
- monitor BMP q8h 
- continue other home meds as reconciled in [facility] 
- PT/OT eval  
 
 
DVT Prophylaxis: DNR/DNI Dispo: pending clinical course Pt will need STR on discharge Radha Song MD

## 2019-03-31 NOTE — PROGRESS NOTES
Hourly rounds performed this shift, needs met at this time. Bed in low/locked position and call light within reach. Will continue to monitor and give bedside report to oncoming nurse. Patient with complaints of sore heels more on left than right. Nurse notices a hard nodule on heel of left foot. Nurse placed mepliex on patient foot for extra cushion.

## 2019-04-01 NOTE — PROGRESS NOTES
END OF SHIFT NOTE: 
 
INTAKE/OUTPUT 
03/31 0701 - 04/01 0700 In: 1060 [P.O.:960; I.V.:100] Out: 675 Nathanael Sol Voiding: YES Catheter: NO 
Color: clear Drain: DIET 
2 GM Na Flatus: Patient does have flatus present. Stool:  1 occurrences. Characteristics: 
Stool Assessment Stool Color: [place] Stool Appearance: Soft, Loose Stool Amount: Medium Stool Source/Status: Rectum Ambulating No 
Emesis: 0 occurrences. Characteristics: VITAL SIGNS Patient Vitals for the past 12 hrs: 
 Temp Pulse Resp BP SpO2  
04/01/19 1639 97.4 °F (36.3 °C) 70 16 101/64 95 % 04/01/19 1151 97.2 °F (36.2 °C) 75 18 112/67 94 % 04/01/19 0720 97.9 °F (36.6 °C) 76 14 101/57 94 % Pain Assessment Pain Intensity 1: 0 (04/01/19 1346) Pain Location 1: Back Pain Intervention(s) 1: Medication (see MAR) Patient Stated Pain Goal: 4 Misael Bentley RN

## 2019-04-01 NOTE — PROGRESS NOTES
OT note: Attempted to see pt for treatment. Pt stated he just worked with PT and requested OT to check back later. Will re-attempt as schedule permits. Allyson Lam

## 2019-04-01 NOTE — PROGRESS NOTES
Hospitalist Progress Note 2019 Admit Date: 3/26/2019  4:21 PM  
NAME: Addis Rodriguez :  1945 MRN:  [id number] Attending: Dal Halsted, MD 
PCP:  Joel Vo MD 
 
SUBJECTIVE:  
 
Addis Rodriguez is a 72years old male with pmhx of alcoholic cirrhosis requiring frequent paracentesis admitted on 3/26 for nausea/vomiting and abdominal pain. He was also found to have hypervolemic hypo-osmolar hyponatremia 119 and mild hyperkalemia. Pt had paracentesis done on 3/25, and received post paracentesis IV albumin. Pt was empirically started on IV rocephin. Fluid culture from 3/25 showed GPC and moderate streptococcus (beta hemolytic). : 
Pt seen. He is more verbal and awake, coherent. Has lethargy Has trace pedal edema. No abdominal pain, nausea/vomiting, fever, chills, diarrhea. Review of Systems negative with exception of pertinent positives noted above PHYSICAL EXAM  
 
 
Visit Vitals /68 (BP 1 Location: Left arm, BP Patient Position: At rest) Pulse 77 Temp 97.7 °F (36.5 °C) Resp 18 Ht 5' 9\" (1.753 m) Wt 92.4 kg (203 lb 9.6 oz) SpO2 94% BMI 30.07 kg/m² Temp (24hrs), Av.1 °F (36.7 °C), Min:97.7 °F (36.5 °C), Max:98.9 °F (37.2 °C) Oxygen Therapy O2 Sat (%): 94 % (19 0511) Pulse via Oximetry: 88 beats per minute (19) O2 Device: Room air (19 0720) Intake/Output Summary (Last 24 hours) at 2019 0800 Last data filed at 2019 2815 Gross per 24 hour Intake 820 ml Output 675 ml Net 145 ml General: Alert, awake, NAD Head:  Atraumatic Normocephalic. Eyes:  PERRLA, EOMI, Anicteric. ENT:  No discharges/lesions. Lungs:  CTAB, no wheezing CVS:  Regular rate and rhythm,  No murmur, rub, or gallop, No JVD, trace pitting LE edema Abdomen: Tense, distended, non tender, BS normoactive MSK:  No deformities, lesions, Spontaneously moves extremities. Neurologic:  GCS 15, no motor or sensory deficits, CN 2-12 intact Psychiatry:      No anxiety/Depression Skin:   No rash/lesions. Good skin turgor Heme/Lymph/Immune:  No petechiae, ecchymoses, overt signs of bleeding or    lymphadenopathy noted. Recent Results (from the past 24 hour(s)) METABOLIC PANEL, BASIC Collection Time: 03/31/19  9:05 AM  
Result Value Ref Range Sodium 120 (LL) 136 - 145 mmol/L Potassium 3.6 3.5 - 5.1 mmol/L Chloride 85 (L) 98 - 107 mmol/L  
 CO2 25 21 - 32 mmol/L Anion gap 10 7 - 16 mmol/L Glucose 217 (H) 65 - 100 mg/dL BUN 27 (H) 8 - 23 MG/DL Creatinine 1.62 (H) 0.8 - 1.5 MG/DL  
 GFR est AA 54 (L) >60 ml/min/1.73m2 GFR est non-AA 45 (L) >60 ml/min/1.73m2 Calcium 8.2 (L) 8.3 - 28.3 MG/DL  
METABOLIC PANEL, BASIC Collection Time: 03/31/19  9:40 PM  
Result Value Ref Range Sodium 120 (LL) 136 - 145 mmol/L Potassium 3.9 3.5 - 5.1 mmol/L Chloride 85 (L) 98 - 107 mmol/L  
 CO2 28 21 - 32 mmol/L Anion gap 7 7 - 16 mmol/L Glucose 177 (H) 65 - 100 mg/dL BUN 28 (H) 8 - 23 MG/DL Creatinine 1.35 0.8 - 1.5 MG/DL  
 GFR est AA >60 >60 ml/min/1.73m2 GFR est non-AA 55 (L) >60 ml/min/1.73m2 Calcium 7.9 (L) 8.3 - 10.4 MG/DL Imaging [facility] All diagnostic imaging personally reviewed by me. CTAP: 
IMPRESSION: 
1. Macronodular cirrhosis with sequela of portal hypertension with mild 
splenomegaly and with perigastric and periesophageal varices evident. Large 
volume of intra-abdominal ascites. 2. Moderately sized left-sided pleural effusion. 3. 1.9 cm intermediate density lesion of the left kidney which is exactly 
characterize recommend follow-up with MRI or renal mass CT. 4. Diffuse wall thickening of the ascending and transverse colon and mild wall 
thickening of the small bowel favored related to fluid status and cirrhosis EXAM: 2 view chest radiograph. ADDITIONAL CLINICAL INFORMATION: 72year-old cough COMPARISON: Chest radiograph dated December 28, 2018. 
  
FINDINGS: 
Mild left basilar atelectasis. No pneumothorax. No pleural effusion. The heart, 
mediastinum, john, and pulmonary vasculature are within normal limits. No 
evidence of acute osseous abnormality. 
  
IMPRESSION IMPRESSION:  
1. Mild left basilar atelectasis without a focal consolidation to suggest 
pneumonia. 
  
ASSESSMENT Hospital Problems as of 4/1/2019 Date Reviewed: 2/26/2019 Codes Class Noted - Resolved POA Leukocytosis ICD-10-CM: D34.943 ICD-9-CM: 288.60  3/26/2019 - Present Yes  
   
 SBP (spontaneous bacterial peritonitis) ([facility] ICD-10-CM: G39.1 ICD-9-CM: 567.23  3/26/2019 - Present Yes Nausea & vomiting ICD-10-CM: R11.2 ICD-9-CM: 787.01  3/26/2019 - Present Yes Hyperkalemia ICD-10-CM: E87.5 ICD-9-CM: 276.7  3/26/2019 - Present Yes NATE (acute kidney injury) ([facility] ICD-10-CM: N17.9 ICD-9-CM: 584.9  3/26/2019 - Present Yes Normocytic anemia ICD-10-CM: D64.9 ICD-9-CM: 285.9  3/26/2019 - Present Yes * (Principal) Hyponatremia ICD-10-CM: E87.1 ICD-9-CM: 276.1  12/28/2018 - Present Yes Essential hypertension ICD-10-CM: I10 
ICD-9-CM: 401.9  10/30/2018 - Present Yes Alcoholic cirrhosis of liver with ascites ([facility] ICD-10-CM: K70.31 ICD-9-CM: 571.2  10/8/2018 - Present Yes Esophageal varices without bleeding ([facility]) ICD-10-CM: I85.00 ICD-9-CM: 456.1  10/8/2018 - Present Yes Plan: - Hyponatremia : difficult to correct, 120 this AM 
Likely from early paracentesis - Continue Xifaxin 
- continue Lexapro for depression 
- discussed about outpatient follow up with Dr. Kurt Cooley to evaluate for TIPS/liver transplant 
- ascitic fluid culture positive for Streptococcus Salivarius - IV rocephin, stopped today 4/1/19 
- Creatinine improved to 1.18 
- s/p paracentesis on 3/29 with removal of 8 ltrs 
- holding aldactone in view of hyperkalemia 
- monitor BMP q8h - continue other home meds as reconciled in [facility] 
- PT/OT eval  
 
 
DVT Prophylaxis: DNR/DNI Dispo: pending clinical course I have discussed with pt and his wife about slow correction of hyponatremia, which is likely a contributing factor to pt's lethargy. Ideally, the goal is for liver transplant in future but given slow recovery from SBP and hyponatremia, pt will probably benefit from TIPS once discharged from the hospital and follows up with Dr. Grady Jaimes as outpatient. Currently, pt is awaiting for hyponatremia correction. Please note, in past, pt's highest sodium level had been up to 126-128. Stressed on keeping fluid restriction.  
 
 
 
Richie Fragoso MD

## 2019-04-01 NOTE — PROGRESS NOTES
Interdisciplinary Rounds completed 04/01/19. Nursing, Case Management, Physician and PT present. Plan of care reviewed and updated. Sodium still low. Awaiting precert for rehab.

## 2019-04-01 NOTE — ADT AUTH CERT NOTES
Utilization Reviews  
 
   
LOC:Acute Adult-Extended Stay (3/31/2019) by Radha Styles  
 
   
Review Status Review Entered In Primary 4/1/2019 16:39  
   
Criteria Review REVIEW SUMMARY 
  
Patient: Luz Hermosillo Review Number: [id number] Review Status: In Primary 
  
Condition Specific: Yes 
  
Condition Level Of Care Code: ACUTE Condition Level Of Care Description: Acute 
  
  
OUTCOMES Outcome Type: Primary 
  
  
  
REVIEW DETAILS 
  
Service Date: 03/31/2019 Admit Date: 03/26/2019 Product: Harini Robles Adult Subset: Extended Stay (Symptom or finding within 24h) 
  (Excludes PO medications unless noted) [X] Select Level of Care, One: 
            [X] ACUTE, >= One: 
                [X] Electrolyte or mineral imbalance, One: 
                    [X] Partial responder, not clinically stable for discharge and requires continued stay, >= One: 
                    ~--Admin, IQ Admin Admin on 04- 04:39 PM--~ 
                    [facility] Progress Note Yesterday his Na dropped back down to 122, but today it is a little better at 123. Cre is 1.45. NH3 was 33. His main complaint is that he cannot walk due to weakness. T 97.8, /65, P 78, R 19 GLUC 133, 115 & 217, BUN 26 & 27 X2, CREAT 1.65, 1.45 & 1.62, CA 8.2 X3,  
                                         
                                        CT HEAD: 1. Remote right basal ganglia lacunar infarction. 2. Otherwise unremarkable unenhanced CT scan of the brain. MRI BRAIN:  . No evidence of acute infarction. 2. Chronic features as above. Assessment: A 72 y.o. male with Etoh cirrhosis, hyponatremia, MELD 25, and ascites. Plan: I stressed to him the importance of the fluid restriction PT should see him tomorrow He is on Xifaxan but not lactulose due to the recent diarrhea He is fully aware that he needs a liver transplant, and he is plugged into the [facility] hepatology clinic Hospitalist Progress Note Plan: - Hyponatremia : difficult to correct, decreased to 120 from 123 Likely from early paracentesis - Continue Xifaxin 
                                        - continue Lexapro for depression 
                                        - discussed about outpatient follow up with Dr. Clarisse Blair to evaluate for TIPS/liver transplant 
                                        - ascitic fluid culture positive for Streptococcus Salivarius - IV rocephin, to be stopped on 4/1/19 
                                        - Creatinine worse from 1.45 to 1.62 
                                        - s/p paracentesis on 3/29 with removal of 8 ltrs 
                                        - holding aldactone in view of hyperkalemia - monitor BMP q8h 
                                        - continue other home meds as reconciled in [facility] 
                                        - PT/OT eval  
                                        ULTRAM 50 MG PO Q 6 HRS PRN X2, LASIX IV DC'D, OTHER MEDS ARE SAME 
                     
                     
                     
                        [X] Hyponatremia or syndrome of inappropriate antidiuretic hormone secretion (SIADH), Both: 
                            [X] Sodium 120-129 mEq/L(120-129 mmol/L) 
                            ~--Admin, IQ Admin Admin on 04- 04:38 PM--~ 
                             & 120 X2 [X] Intervention, >= One: 
                                [X] Diuretic and oral sodium supplement ~--Admin, IQ Admin Admin on 04- 04:39 PM--~ 
                                BUMINATE 12.5 MG IV Q 6 HRS, LASIX 
                                 
                                 
                                 
  
Version: BaculaQual® 2018.1 Surya Quintana  © 2018 [employer] and/or one of its [place]. All Rights Reserved. CPT only © 2017 American Medical Association. All Rights Reserved.  
   
LOC:Acute Adult-Extended Stay (3/30/2019) by Phyllis Elkins  
 
   
Review Status Review Entered In Primary 4/1/2019 16:26  
   
Criteria Review REVIEW SUMMARY 
  
Patient: Jeremy Ponce Review Number: [id number] Review Status: In Primary 
  
Condition Specific: Yes 
  
Condition Level Of Care Code: ACUTE Condition Level Of Care Description: Acute 
  
  
OUTCOMES Outcome Type: Primary 
  
  
  
REVIEW DETAILS 
  
Service Date: 03/30/2019 Admit Date: 03/26/2019 Product: Benji Branch Adult Subset: Extended Stay (Symptom or finding within 24h) 
  (Excludes PO medications unless noted) [X] Select Level of Care, One: 
            [X] ACUTE, >= One: 
                [X] Electrolyte or mineral imbalance, One: 
                    [X] Partial responder, not clinically stable for discharge and requires continued stay, >= One: 
                    ~--Admin, IQ Admin Admin on 04- 04:21 PM--~ Hospitalist Progress Note Pt reports feeling okay, and he does not want to go for liver transplant in future. Family reports having concerns for pt being depressed and sightly confused. No abdominal pain, nausea/vomiting, fever, chills, diarrhea. EXAM:  Lungs:   Bibasilar crackles. CVS: 1+ pitting LE edema. Abdomen: Tense, distended, non tender, BS normoactive. T 98.2, /63, P 84, R 17, 02 SAT 95% RA 
                     
                    GLUC 133, BUN 26, CREAT 1.65, CA 8.2, Plan: 
                    - pt is clinically doing okay, has lethargy and fatigue. Pt's family reports that pt is slightly confused. 
                    - NH3 is 33. Continue Xifaxin 
                    - started on Lexapro for depression 
                    - discussed about outpatient follow up with Dr. Elle Butt to evaluate for TIPS/liver transplant 
                    - ascitic fluid culture positive for Streptococcus Salivarius - IV rocephin for 1 more day - Hyponatremia: 126, improved - Creatinine is trending down, 1.56 << 1.59 << 1.60 
                    - s/p paracentesis on 3/29 with removal of 8 ltrs 
                    - lasix and albumin on hold for now 
                    - holding aldactone in view of hyperkalemia 
                    - monitor BMP q8h - continue other home meds as reconciled in [facility] 
                    - PT/OT eval  
                    Pt will need STR on discharge 
                      
                     
                    GI Problem: Etoh cirrhosis 
                      seen in consultation for Etoh cirrhosis. His Na is up to 126. His cre is 1.55. Abstinent since July 2018. His MELD is 25. He is hesitant to consider liver transplant, but hie family wants him to. He has had confusion and hallucinations. He had a paracentesis yesterday removing almost 8 L. His abdomen feels a lot better. He is unable to walk. He has no appetite. He is depressed. Plan: 
                      
                      
                    Continue fluid restriction, follow Na Check NH3, start Xifaxan 550 bid, no lactulose because having diarrhea Needs to discuss liver transplant with Mr. Kourtney Guzman at [facility] hepatology Start lexapro Supplement drinks PT 
                     
                    TYLENOL 650 MG PO Q 4 HRS PRN X 2, LEXAPRO 210 MG PO QD, NMEURONTIN 400 MG PO TID, HEPARIN 5000U SC Q 12 HRS, MAG  MG PO QD, XIFAXAN 550 MG PO BID, ULTRAM 50 MG PO Q 6 HRS PRN X1, ROCEPHIN 2G IV Q 24 HRS, 
                     
                     
                     
                        [X] Hyponatremia or syndrome of inappropriate antidiuretic hormone secretion (SIADH), Both: 
                            [X] Sodium 120-129 mEq/L(120-129 mmol/L) 
                            ~--Admin, IQ Admin Admin on 04- 04:13 PM--~ 
                             & 122 [X] Intervention, >= One: 
                                [X] Medication administration, >= One: [X] Diuretic >= 2x/24h 
                                    ~--Admin, IQ Admin Admin on 04- 04:13 PM--~ 
                                    LASIX 40 MG IV BID, BUMINATE 12.5 G IV Q 6 HRS 
                                     
                                     
                                     
  
Version: InterQual® 2018.1 Ina Del Valle  © 2018 [employer] and/or one of its [place]. All Rights Reserved. CPT only © 2017 American Medical Association. All Rights Reserved.  
   
LOC:Acute Adult-Extended Stay (3/29/2019) by Jennifer Garcia  
 
   
Review Status Review Entered In Primary 3/29/2019 16:57  
   
Criteria Review REVIEW SUMMARY 
  
Patient: Cordelia Perales Review Number: [id number] Review Status: In Primary 
  
Condition Specific: Yes 
  
Condition Level Of Care Code: ACUTE Condition Level Of Care Description: Acute 
  
  
OUTCOMES Outcome Type: Primary 
  
  
  
REVIEW DETAILS 
  
Service Date: 03/29/2019 Admit Date: 03/26/2019 Product: Katie Divine Adult Subset: Extended Stay (Symptom or finding within 24h) 
  (Excludes PO medications unless noted) [X] Select Level of Care, One: 
            [X] ACUTE, >= One: 
                [X] Electrolyte or mineral imbalance, One: 
                    [X] Partial responder, not clinically stable for discharge and requires continued stay, >= One: 
                    ~--Admin, IQ Admin Admin on 03- 04:57 PM--~ Hospitalist Progress Note Pt had paracentesis done on 3/25, and received post paracentesis IV albumin. Pt was empirically started on IV rocephin. Fluid culture from 3/25 showed GPC and moderate streptococcus (beta hemolytic) Cheyenne Tompkins 3/29: 
                    Pt seen at bedside Pt's wife also present Pt is feeling better with persistent abdominal distention and pedal edema. As per the pt, abdominal distention hasn't changed much but pedal edema is improving. EXAM: Lungs:     Bibasilar crackles,  CVS:   Regular rate and rhythm,  No murmur, rub, or gallop, No JVD, 1+ pitting LE edema. Abdomen:  Tense, distended, non tender, fluid thrill and shifting dullness ++ , BS normoactive T 99.1, /68, P 90, R 16 RBC 3.06, HGB 9.0, HCT 26.7, , CL 89, GLUC 109, BUN 27, CREAT 1.59, Plan: 
                    - ascitic fluid culture positive for Streptococcus Salivarius - IV rocephin, D2. Will switch to oral clindamycin  
                    - ascitic fluid chemistry with Polys < 128 
                    - Hyponatremia: 121, gradually improving - Creatinine is trending down, 1.59 << 1.60 
                    - pt will need Paracentesis once Na improves 
                    - lasix and albumin on hold for now 
                    - holding aldactone in view of hyperkalemia - plan for TIPS as outpatient by Dr. Kian Lopez for recurrent ascites 
                    - lactulose to prevent constipation 
                    - monitor BMP q8h 
                    - continue other home meds as reconciled in [facility] 
                    - PT/OT eval  
                     
                     
                    [facility] Progress Note History of Present Illness:  Patient is a 72 y.o. male who is seen in consultation for Etoh cirrhosis, ascites, and hyponatremia. Glad Na came up from 119 to 121. Cer is 1.59. Has lots leg edema. Abdomen very distended. On abx for recent SBP. Plan: 
                      
                      
                    Apparently paracentesis this afternoon Albumin with para Has been on abx for possible SBP Continue fluid restriction Appreciate renal input- continuing lasix ROCEPHIN 2G IV Q 24 HRS, FERROUS SULFATE 325 MG PO QD, NEURONTIN 400 MG PO TID, HEPARIN 5000U SC Q 12 HRS, LACTULOSE 30G PO TID, MAG  MG PO QD, ULTRAM 50 MG PO Q 6 HRS PRN X1 
                     
                     
                     
                        [X] Hyponatremia or syndrome of inappropriate antidiuretic hormone secretion (SIADH), Both: 
                            [X] Sodium 120-129 mEq/L(120-129 mmol/L) 
                            ~--Admin, IQ Admin Admin on 03- 04:49 PM--~ 
                             [X] Intervention, >= One: 
                                [X] Fluid restriction ~--Admin, IQ Admin Admin on 03- 04:49 PM--~ 
                                DIET REGULAR 2 GM NA (House Low NA); FR 1000ML [X] Medication administration, >= One: 
                                    [X] Diuretic >= 2x/24h 
                                    ~--Admin, IQ Admin Admin on 03- 04:50 PM--~ 
                                    BUMINATE 100G IV X1 & 12.5G IV Q 6 HRS, LASIX 40 MG IV BID 
                                     
                                     
                                     
  
Version: InterQual® 2018. 1 Carmel Sal  © 2018 [facility] and/or one of its [place]. All Rights Reserved. CPT only © 2017 American Medical Association. All Rights Reserved.

## 2019-04-01 NOTE — PROGRESS NOTES
Hourly rounds performed this shift, needs met at this time. Bed in low/locked position and call light within reach. Will continue to monitor and give bedside report to oncoming nurse.

## 2019-04-01 NOTE — DISCHARGE SUMMARY
[facility] Progress Note Admit Date:  3/26/2019 Today's Date:  4/1/2019 CC:  ETOH cirrhosis, hyponatremia, ascites Subjective:  
 
Patient: Patient feel better as far as his ascites is concerned. Denies any abdominal pain, N&V, or overt bleeding. He is concerned about his weakness and ambulation. Medications:  
Current Facility-Administered Medications Medication Dose Route Frequency  rifAXIMin (XIFAXAN) tablet 550 mg  550 mg Oral BID  escitalopram oxalate (LEXAPRO) tablet 10 mg  10 mg Oral DAILY  albumin human 25% (BUMINATE) solution 12.5 g  12.5 g IntraVENous Q6H  
 traMADol (ULTRAM) tablet 50 mg  50 mg Oral Q6H PRN  
 heparin (porcine) injection 5,000 Units  5,000 Units SubCUTAneous Q12H  ferrous sulfate tablet 325 mg  325 mg Oral ACB  gabapentin (NEURONTIN) capsule 400 mg  400 mg Oral TID  magnesium oxide (MAG-OX) tablet 400 mg  400 mg Oral DAILY  sodium chloride (NS) flush 5-40 mL  5-40 mL IntraVENous Q8H  
 sodium chloride (NS) flush 5-40 mL  5-40 mL IntraVENous PRN  
 acetaminophen (TYLENOL) tablet 650 mg  650 mg Oral Q4H PRN  
 ondansetron (ZOFRAN) injection 4 mg  4 mg IntraVENous Q4H PRN  
 magnesium hydroxide (MILK OF MAGNESIA) 400 mg/5 mL oral suspension 30 mL  30 mL Oral DAILY PRN  
 zolpidem (AMBIEN) tablet 5 mg  5 mg Oral QHS PRN Review of Systems: ROS was obtained, with pertinent positives as listed above. No chest pain or SOB. Diet:  2gm sodium Objective:  
Vitals: 
Visit Vitals /57 Pulse 76 Temp 97.9 °F (36.6 °C) Resp 14 Ht 5' 9\" (1.753 m) Wt 92.4 kg (203 lb 9.6 oz) SpO2 94% BMI 30.07 kg/m² Intake/Output: 
No intake/output data recorded. 03/30 1901 - 04/01 0700 In: 1888 [P.O.:960; I.V.:100] Out: 975 [Urine:975] Exam: 
General appearance: alert, cooperative, no distress SISTER IN LAW AT BEDSIDE Lungs: clear to auscultation bilaterally anteriorly Heart: regular rate and rhythm Abdomen: MODERATE DISTENTION Bowel sounds normal X4. No masses, no organomegaly Extremities: extremities normal, atraumatic, no cyanosis or edema Neuro:  alert and oriented X4; NO ASTERIXIS Data Review (Labs):   
Recent Labs 04/01/19 
3546 03/31/19 
2140 03/31/19 
6169 03/31/19 
0350 03/30/19 2031 03/30/19 
8645 WBC  --   --   --   --   --  6.3 HGB  --   --   --   --   --  9.4* HCT  --   --   --   --   --  27.2*  
PLT  --   --   --   --   --  122* MCV  --   --   --   --   --  87.2 * 120* 120* 123* 122* 126*  
K 3.9 3.9 3.6 3.6 3.7 3.5 CL 86* 85* 85* 88* 87* 90* CO2 27 28 25 26 28 25 BUN 27* 28* 27* 27* 26* 26* CREA 1.18 1.35 1.62* 1.45 1.65* 1.55* CA 8.1* 7.9* 8.2* 8.2* 8.2* 8.5 * 177* 217* 115* 133* 133* Assessment:  
 
Principal Problem: Hyponatremia (12/28/2018) Active Problems: 
  Alcoholic cirrhosis of liver with ascites ([facility] [address] (10/8/2018) Esophageal varices without bleeding ([facility] [address] (10/8/2018) Essential hypertension (10/30/2018) Leukocytosis (3/26/2019) SBP (spontaneous bacterial peritonitis) ([address] (3/26/2019) Nausea & vomiting (3/26/2019) Hyperkalemia (3/26/2019) NATE (acute kidney injury) ([address] (3/26/2019) Normocytic anemia (3/26/2019) 72year old old known male patient with history of ETOH cirrhosis, hyponatremia, and recurrent ascites. His last MELD was 25. He is followed by [facility] hepatology liver transplant clinic. Current sodium still low at 120 on 4/1. Had repeat paracentesis on 3/29 with 8 L removed. He has had renal involvement and is being followed by nephrology. Midodrine and Sandostatin have been discussed. Plan: 1. Continue fluid restriction and 2gm sodium diet 2. Continue xifaxan 3. Agree with PT 4. Repeat LFTS and PT INR with am labs 5. Patient is aware he needs liver transplant in the future and is followed by [facility] hepat[facility].   
6.Appreciate renal 
 Anders Watkins. Therese Fleming in collaboration with Dr. Humza Cooley [facility]10: Spoke with Sirisha Bean PA-C who is following the patient locally for [facility] hepatology. Patient has not currently been listed or been to [facility] for evaluation because his previous MELD was only 10 at the time that he saw Mr. Fahad Devries. TIPPs was discussed at that time for recurrent ascites. Unfortunately, now with his recent Hepatic encephalopathy TIPPs would not be a good choice. Discussed with patient and sister-in-law about transfer [facility]. He will discuss this with his family and let us know. Alternative plan would be home on hospice with possible Pleur ex cath.

## 2019-04-01 NOTE — PROGRESS NOTES
Problem: Self Care Deficits Care Plan (Adult) Goal: *Acute Goals and Plan of Care (Insert Text) Description 1. Patient will complete lower body bathing and dressing with minimal assistance and adaptive equipment as needed. 2. Patient will complete toileting with CGA. 3. Patient will tolerate 25 minutes of OT treatment with 1-2 rest breaks to increase activity tolerance for ADLs. 4. Patient will complete functional transfers with supervision and adaptive equipment as needed. 5. Patient will complete functional mobility for household distances with supervision and appropriate safety awareness. Timeframe: 7 visits Outcome: Progressing Towards Goal 
  
 
OCCUPATIONAL THERAPY: Daily Note and PM  
 4/1/2019 INPATIENT: OT Visit Days: 2 Payor: [employer] FIRST CHOICE / Plan: [facility] FIRST CHOICE / Product Type: [employer] Medicare /  
  
NAME/AGE/GENDER: Aziza Meza is a 72 y.o. male PRIMARY DIAGNOSIS:  Hyponatremia [E87.1] Hyponatremia Hyponatremia ICD-10: Treatment Diagnosis:  
 · Generalized Muscle Weakness (M62.81) · Other lack of cordination (R27.8) Precautions/Allergies: 
   Hydromet [hydrocodone-homatropine] ASSESSMENT:  
Mr. Jozef Lopez was admitted with hyponatremia. Pt lives with his  in an apartment with a tub/shower and is needing assistance with LB ADL at baseline. Pt was using a cane for functional mobility. Pt has had difficulty with getting in/out of the tub/shower with some near falls. 4/1/2019 Pt presents in supine upon arrival. Pt agreeable to treatment with encouragement and transferred to sitting with mod a. Pt sat edge of bed with fair balance with intervals of working on sit to stand transfers and bed to chair and chair to bed transfers. Pt needed mod a with sit to stand transfers and mod/max a x's 2 with standing mobility.  Pt returned to edge of bed and was assisted back to bed with mod a with posey on and belongings in reach. Good effort. Continue POC. This section established at most recent assessment PROBLEM LIST (Impairments causing functional limitations): 1. Decreased Strength 2. Decreased ADL/Functional Activities 3. Decreased Transfer Abilities 4. Decreased Ambulation Ability/Technique 5. Decreased Balance 6. Increased Pain 7. Decreased Activity Tolerance 8. Increased Fatigue 9. Increased Shortness of Breath 10. Decreased Flexibility/Joint Mobility 11. Edema/Girth 12. Decreased [place] with Home Exercise Program 
13. Decreased Cognition INTERVENTIONS PLANNED: (Benefits and precautions of occupational therapy have been discussed with the patient.) 1. Activities of daily living training 2. Adaptive equipment training 3. Balance training 4. Clothing management 5. Cognitive training 6. Donning&doffing training 7. Neuromuscular re-eduation 8. Therapeutic activity 9. Therapeutic exercise TREATMENT PLAN: Frequency/Duration: Follow patient 3 times per week to address above goals. Rehabilitation Potential For Stated Goals: Good RECOMMENDED REHABILITATION/EQUIPMENT: (at time of discharge pending progress): Due to the probability of continued deficits (see above) this patient will likely need continued skilled occupational therapy after discharge. Equipment: ? Discussed with family about tub transfer bench OCCUPATIONAL PROFILE AND HISTORY:  
History of Present Injury/Illness (Reason for Referral): 
See H&P Past Medical History/Comorbidities:  
Mr. Erica Luna  has a past medical history of Bronchitis, Cirrhosis of liver with ascites ([address] Hypertension, and Ulcer of ileum. Mr. Erica Luna  has a past surgical history that includes hx orthopaedic; hx orthopaedic; and hx appendectomy. Social History/Living Environment:  
Home Environment: Apartment # Steps to Enter: 2 One/Two Story Residence: One story Living Alone: No 
Support Systems: Family member(s) Patient Expects to be Discharged to[de-identified] Rehabilitation facility Current DME Used/Available at Home: Cane, straight, Safety frame toliet, Shower chair, Walker, rolling Tub or Shower Type: Tub/Shower combination Prior Level of Function/Work/Activity: 
Pt lives with his  in an apartment with a tub/shower and is needing assistance with LB ADL at baseline. Pt was using a cane for functional mobility. Pt has had difficulty with getting in/out of the tub/shower with some near falls. Personal Factors:   
      Past/Current Experience:  hx of near falls and weakness at home Other factors that influence how disability is experienced by the patient:  multiple co-morbidities Number of Personal Factors/Comorbidities that affect the Plan of Care: Extensive review of physical, cognitive, and psychosocial performance (3+):  HIGH COMPLEXITY ASSESSMENT OF OCCUPATIONAL PERFORMANCE[de-identified]  
Activities of Daily Living:  
Basic ADLs (From Assessment) Complex ADLs (From Assessment) Feeding: Setup Oral Facial Hygiene/Grooming: Stand-by assistance Bathing: Moderate assistance Upper Body Dressing: Minimum assistance Lower Body Dressing: Maximum assistance Toileting: Moderate assistance Instrumental ADL Meal Preparation: Total assistance Homemaking: Total assistance Grooming/Bathing/Dressing Activities of Daily Living Bed/Mat Mobility Rolling: Minimum assistance Supine to Sit: Moderate assistance Sit to Supine: Moderate assistance Sit to Stand: Moderate assistance;Assist x2 Stand to Sit: Moderate assistance;Assist x2 Bed to Chair: Moderate assistance;Maximum assistance;Assist x2 Scooting: Moderate assistance Most Recent Physical Functioning:  
Gross Assessment: 
  
         
  
Posture: 
Posture (WDL): Exceptions to [facility] Posture Assessment: Forward head, Rounded shoulders Balance: 
Sitting: Impaired Sitting - Static: Fair (occasional) Sitting - Dynamic: Fair (occasional) Standing: Impaired Standing - Static: Poor Standing - Dynamic : Poor Bed Mobility: 
Rolling: Minimum assistance Supine to Sit: Moderate assistance Sit to Supine: Moderate assistance Scooting: Moderate assistance Wheelchair Mobility: 
  
Transfers: 
Sit to Stand: Moderate assistance;Assist x2 Stand to Sit: Moderate assistance;Assist x2 Bed to Chair: Moderate assistance;Maximum assistance;Assist x2 Interventions: Verbal cues; Visual cues; Safety awareness training; Tactile cues;Manual cues Duration: 18 Minutes Patient Vitals for the past 6 hrs: 
 BP SpO2 Pulse 19 1151 112/67 94 % 75 Mental Status Neurologic State: Alert, Appropriate for age, Eyes open spontaneously Orientation Level: Oriented X4 Cognition: Appropriate decision making, Appropriate for age attention/concentration, Appropriate safety awareness, Follows commands Perception: Appears intact Perseveration: No perseveration noted Safety/Judgement: Fall prevention Physical Skills Involved: 1. Range of Motion 2. Balance 3. Strength 4. Activity Tolerance 5. Pain (acute) 6. Edema Cognitive Skills Affected (resulting in the inability to perform in a timely and safe manner): 1. Divided Attention Psychosocial Skills Affected: 1. Habits/Routines 2. Self-Awareness Number of elements that affect the Plan of Care: 5+:  HIGH COMPLEXITY CLINICAL DECISION MAKIN [facility] [address] [address] AM-PAC 6 Clicks Daily Activity Inpatient Short Form How much help from another person does the patient currently need. .. Total A Lot A Little None 1. Putting on and taking off regular lower body clothing? ? 1   ? 2   ? 3   ? 4  
2. Bathing (including washing, rinsing, drying)? ? 1   ? 2   ? 3   ? 4  
3. Toileting, which includes using toilet, bedpan or urinal?   ? 1   ? 2   ? 3   ? 4  
4. Putting on and taking off regular upper body clothing?    ? 1   ? 2   ? 3   ? 4  
 5. Taking care of personal grooming such as brushing teeth? ? 1   ? 2   ? 3   ? 4  
6. Eating meals? ? 1   ? 2   ? 3   ? 4  
© 2007, Trustees of [facility] [employer], under license to [employer]. All rights reserved Score:  Initial: 15 Most Recent: X (Date: -- ) Interpretation of Tool:  Represents activities that are increasingly more difficult (i.e. Bed mobility, Transfers, Gait). Medical Necessity:    
· Patient demonstrates good ·  rehab potential due to higher previous functional level. Reason for Services/Other Comments: 
· Patient continues to require skilled intervention due to decreased independence with ADL/functional transfers · . Use of outcome tool(s) and clinical judgement create a POC that gives a: LOW COMPLEXITY  
 
 
 
TREATMENT:  
(In addition to Assessment/Re-Assessment sessions the following treatments were rendered) Pre-treatment Symptoms/Complaints:   
Pain: Initial:  
Pain Intensity 1: 0  Post Session:  0/10 Therapeutic Activity: (23 minutes): Therapeutic activities including Bed transfers, Chair transfers, edge of bed sitting  and static/dynamic standing to improve mobility, strength and balance. Required moderate Verbal cues; Visual/Demos; Safety awareness training; Tactile cues;Manual cues to promote static and dynamic balance in standing. Braces/Orthotics/Lines/Etc:  
· O2 Device: Room air Treatment/Session Assessment:   
· Response to Treatment:  Decreased activity tolerance · Interdisciplinary Collaboration:  
o Certified Occupational Therapy Assistant 
o Registered Nurse 
o Rehabilitation Attendant · After treatment position/precautions:  
o Supine in bed 
o Bed alarm/tab alert on 
o Bed/Chair-wheels locked 
o Call light within reach 
o RN notified 
o Side rails x 3  
· Compliance with Program/Exercises: Will assess as treatment progresses. · Recommendations/Intent for next treatment session:   \"Next visit will focus on advancements to more challenging activities and reduction in assistance provided\". Total Treatment Duration: OT Patient Time In/Time Out Time In: 4989 Time Out: 1536 Nadege Carias

## 2019-04-01 NOTE — PROGRESS NOTES
Problem: Mobility Impaired (Adult and Pediatric) Goal: *Acute Goals and Plan of Care (Insert Text) Description LTG: 
(1.)Mr. Jasmina Olivares will move from supine to sit and sit to supine , scoot up and down and roll side to side with INDEPENDENT within 7 treatment day(s) from flat surface without handrail through log rolling.   
(2.)Mr. Jasmina Olivares will transfer from bed to chair and chair to bed with MODIFIED INDEPENDENCE using the least restrictive device within 7 treatment day(s). (3.)Mr. Jasmina Olivares will ambulate with MODIFIED INDEPENDENCE for 250+ feet with the least restrictive device within 7 treatment day(s) while maintaining normal vital signs. (4.)Mr. Jasmina Olivares will perform 2 steps with SBA within 7 treatment days for safety ascending and descending stairs for home.  
_______________________________________________________________________________________ Outcome: Progressing Towards Goal 
  
PHYSICAL THERAPY: Daily Note and AM 4/1/2019 INPATIENT: PT Visit Days : 2 Payor: [employer] FIRST CHOICE / Plan: [facility] FIRST CHOICE / Product Type: Managed Care Medicare /   
  
NAME/AGE/GENDER: Leonid Corral is a 72 y.o. male PRIMARY DIAGNOSIS: Hyponatremia [E87.1] Hyponatremia Hyponatremia ICD-10: Treatment Diagnosis: · Difficulty in walking, Not elsewhere classified (R26.2) Precaution/Allergies: 
Hydromet [hydrocodone-homatropine] ASSESSMENT:  
Mr. Jasmina Olivares presents sitting up in bed with visitor at bedside. No complaints however feels weak. Agreeable to mobility. Requires increased assist with bed mobility, transfers, standing balance, and ambulation.  Demonstrates strong posterior trunk lean in standing and needs constant mod-max assist. Pt worked on standing balance and weight shifts, side steps, posture, walker management, and on last attempt bed to chair transfer (4') with walker and heavy assist. Practiced sit-stand transfers x 3 more trials with pt improving after repetition. Took seated rest break, then performs below exercises with good participation. No real progress today as pt seems slightly weaker compared to previous treatment. Will continue with therapy efforts as pt appears motivated to participate. This section established at most recent assessment PROBLEM LIST (Impairments causing functional limitations): 1. Decreased Strength 2. Decreased ADL/Functional Activities 3. Decreased Transfer Abilities 4. Decreased Ambulation Ability/Technique 5. Decreased Balance 6. Increased Pain 7. Decreased Activity Tolerance 8. Increased Fatigue 9. Edema/Girth 10. Decreased [place] with Home Exercise Program 
 INTERVENTIONS PLANNED: (Benefits and precautions of physical therapy have been discussed with the patient.) 1. Balance Exercise 2. Bed Mobility 3. Family Education 4. Gait Training 5. Home Exercise Program (HEP) 6. Therapeutic Activites 7. Therapeutic Exercise/Strengthening 8. Transfer Training TREATMENT PLAN: Frequency/Duration: 3 times a week for duration of hospital stay Rehabilitation Potential For Stated Goals: Good RECOMMENDED REHABILITATION/EQUIPMENT: (at time of discharge pending progress): Due to the probability of continued deficits (see above) this patient will likely need continued skilled physical therapy after discharge. Equipment: ? To be determined HISTORY:  
History of Present Injury/Illness (Reason for Referral): 
Yari Blevins is a 72 y.o. male with a past medical history of alcoholic cirrhosis who presents to the ER with complaint of nausea, vomiting, and abdominal pain starting yesterday afternoon after his paracentesis. He reports having a paracentesis performed with 6L fluid removed. He also reports getting \"4 bottles\" of albumin IV. He reports that he felt well prior to this.  Throughout the evening he began getting more abdominal sharp pain \"like a knife. \" He also admits to chills and subjective fevers last night. This morning, he felt very weak and could not stand up without help. Reports that he feels much better today after receiving IVF in ER. Past Medical History/Comorbidities:  
Mr. Timothy Grossman  has a past medical history of Bronchitis, Cirrhosis of liver with ascites ([address] Hypertension, and Ulcer of ileum. Mr. Timothy Grossman  has a past surgical history that includes hx orthopaedic; hx orthopaedic; and hx appendectomy. Social History/Living Environment:  
Home Environment: Apartment # Steps to Enter: 2 One/Two Story Residence: One story Living Alone: No 
Support Systems: Family member(s) Patient Expects to be Discharged to[de-identified] Rehabilitation facility Current DME Used/Available at Home: Cane, straight, Safety frame toliet, Shower chair, Walker, rolling Tub or Shower Type: Tub/Shower combination Prior Level of Function/Work/Activity: 
Lives with spouse, uses cane for ambulation, has required increased assist ADLs, no recent falls, drives Personal Factors:   
      Sex:  male Age:  72 y.o. Overall Behavior:  agreeable Number of Personal Factors/Comorbidities that affect the Plan of Care: 
Alcoholic cirrhosis, age 2-2: MODERATE COMPLEXITY EXAMINATION:  
Most Recent Physical Functioning:  
Gross Assessment: 
  
         
  
Posture: 
  
Balance: 
Sitting: Impaired Sitting - Static: Fair (occasional) Sitting - Dynamic: Fair (occasional) Standing: Impaired Standing - Static: Poor Standing - Dynamic : Poor Bed Mobility: 
Supine to Sit: Moderate assistance Scooting: Moderate assistance Wheelchair Mobility: 
  
Transfers: 
Sit to Stand: Moderate assistance Stand to Sit: Moderate assistance Bed to Chair: Moderate assistance;Maximum assistance Interventions: Verbal cues; Visual cues; Safety awareness training; Tactile cues;Manual cues Duration: 18 Minutes Gait: 
  
 Base of Support: Center of gravity altered Speed/Aleyda: Pace decreased (<100 feet/min); Slow Step Length: Left shortened;Right shortened Gait Abnormalities: Decreased step clearance; Path deviations Distance (ft): 4 Feet (ft) Assistive Device: Walker, rolling Ambulation - Level of Assistance: Moderate assistance;Maximum assistance Interventions: Verbal cues; Visual/Demos; Safety awareness training; Tactile cues;Manual cues Body Structures Involved: 1. Muscles Body Functions Affected: 1. Movement Related Activities and Participation Affected: 1. General Tasks and Demands 2. Mobility 3. Self Care Number of elements that affect the Plan of Care: 4+: HIGH COMPLEXITY CLINICAL PRESENTATION:  
Presentation: Evolving clinical presentation with changing clinical characteristics: MODERATE COMPLEXITY CLINICAL DECISION MAKIN[address] [place] [address] AM-PAC 6 Clicks Basic Mobility Inpatient Short Form How much difficulty does the patient currently have. .. Unable A Lot A Little None 1. Turning over in bed (including adjusting bedclothes, sheets and blankets)? ? 1   ? 2   ? 3   ? 4  
2. Sitting down on and standing up from a chair with arms ( e.g., wheelchair, bedside commode, etc.)   ? 1   ? 2   ? 3   ? 4  
3. Moving from lying on back to sitting on the side of the bed?   ? 1   ? 2   ? 3   ? 4 How much help from another person does the patient currently need. .. Total A Lot A Little None 4. Moving to and from a bed to a chair (including a wheelchair)? ? 1   ? 2   ? 3   ? 4  
5. Need to walk in hospital room? ? 1   ? 2   ? 3   ? 4  
6. Climbing 3-5 steps with a railing? ? 1   ? 2   ? 3   ? 4  
© , Trustees of [address] [place] [id number], under license to [employer]. All rights reserved Score:  Initial: 14 Most Recent: X (Date: -- ) Interpretation of Tool:  Represents activities that are increasingly more difficult (i.e. Bed mobility, Transfers, Gait). Medical Necessity: · Patient is expected to demonstrate progress in strength, range of motion, balance and coordination ·  to decrease assistance required with overall functional mobility, transfers, ambulation · . · Patient demonstrates good ·  rehab potential due to higher previous functional level. Reason for Services/Other Comments: 
· Patient continues to require present interventions due to patient's inability to perform bed mobility, transfers, ambulation safely and effectively · . Use of outcome tool(s) and clinical judgement create a POC that gives a: Questionable prediction of patient's progress: MODERATE COMPLEXITY  
  
 
 
 
TREATMENT:  
(In addition to Assessment/Re-Assessment sessions the following treatments were rendered) Pre-treatment Symptoms/Complaints:  \"I don't know if I can\" Pain: Initial:  
Pain Intensity 1: 0  Post Session:  0/10 Therapeutic Activity: (  18 Minutes ):  Therapeutic activities including Bed transfers, Chair transfers, Ambulation on level ground and sit-stand and standing pre-gait activities to improve mobility, strength, balance and coordination. Required moderate Verbal cues; Visual/Demos; Safety awareness training; Tactile cues;Manual cues to promote static and dynamic balance in standing and promote motor control of bilateral, lower extremity(s). Therapeutic Exercise: (10 Minutes):  Exercises per grid below to improve mobility, strength and balance. Required minimal visual and verbal cues to promote proper body mechanics and exercise form. Progressed repetitions and complexity of movement as indicated. Date: 
4/1/19 Date: 
 Date: Activity/Exercise Parameters Parameters Parameters LAQ 15x AB Seated marching 15x AB Ankle pumps 15x AB Seated hip aBd 15x AB Braces/Orthotics/Lines/Etc:  
· O2 Device: Room air Treatment/Session Assessment:   
· Response to Treatment: pt performs mobility with mod-max A.  Weak but participates well. · Interdisciplinary Collaboration:  
o Physical Therapist 
o Registered Nurse · After treatment position/precautions:  
o Up in chair 
o Bed/Chair-wheels locked 
o Bed in low position 
o Call light within reach 
o Family at bedside · Compliance with Program/Exercises: Will assess as treatment progresses · Recommendations/Intent for next treatment session: \"Next visit will focus on advancements to more challenging activities\". Total Treatment Duration: PT Patient Time In/Time Out Time In: 1107 Time Out: 1135 Francis Malik DPT

## 2019-04-01 NOTE — PROGRESS NOTES
Staff from [facility] called primary RN reporting the pt is on the list for possible transfer to [facility]. She reports the [facility] [address],[address] staff will call tomorrow to update regarding bed availability. Generally efforts at transfer do not exceed 2100 per [facility] staff. Someone will continue to call CM tomorrow with availability updates. PM RN Manager Supriya Payton was made aware. The number for Rachele Rabbit is [phone].

## 2019-04-01 NOTE — PROGRESS NOTES
's visit requested by nurse after staff provided the options of a liver transplant or home with hospice for Mr. Kamille Wei. Upon my arrival Mr. Jozef Lopez was sleeping and I spoke with his wife in the hallway to offer support. She was tearful and expressed her desire to respect patient's decision as it becomes known. I offered spiritual interventions, including affirmation of emotions & monica, exploration of coping skills, and assurance of prayers.  follow-up is available. Mehul Dunbar MDiv Board Certified [address] [place] [address]

## 2019-04-01 NOTE — PROGRESS NOTES
[facility] Progress Note Follow-Up on: NATE, hyponatremia Patient seen and examined. Feeling ok. Weak. No sob, cp, n/v. Renal function improving. Sodium stable at 120. ROS: 
Gen - no fever, no chills, appetite unchanged CV - no chest pain, no palpitation Lung - no shortness of breath, no cough Abd - no tenderness, no nausea/vomiting, no diarrhea Ext - + edema Exam: 
Vitals:  
 03/31/19 2302 04/01/19 0511 04/01/19 0720 04/01/19 1151 BP: 107/67 104/68 101/57 112/67 Pulse: 77 77 76 75 Resp: 18 18 14 18 Temp: 97.7 °F (36.5 °C) 97.7 °F (36.5 °C) 97.9 °F (36.6 °C) 97.2 °F (36.2 °C) SpO2: 95% 94% 94% 94% Weight:  92.4 kg (203 lb 9.6 oz) Height:      
 
 
 
Intake/Output Summary (Last 24 hours) at 4/1/2019 1203 Last data filed at 4/1/2019 4480 Gross per 24 hour Intake 460 ml Output 375 ml Net 85 ml Wt Readings from Last 3 Encounters:  
04/01/19 92.4 kg (203 lb 9.6 oz) 02/26/19 87.1 kg (192 lb)  
02/21/19 90.7 kg (200 lb) GEN - in no distress CV - regular, 2/6 murmur, no rub Lung - clear bilaterally Abd - soft, nontender Ext - 2++ edema Recent Labs  
  03/30/19 
0458 WBC 6.3 HGB 9.4* HCT 27.2*  
* Recent Labs 04/01/19 
3609 03/31/19 
2140 03/31/19 
8421 * 120* 120*  
K 3.9 3.9 3.6 CL 86* 85* 85* CO2 27 28 25 BUN 27* 28* 27* CREA 1.18 1.35 1.62* CA 8.1* 7.9* 8.2*  
* 177* 217* Assessment / Plan: 
Principal Problem: Hyponatremia (12/28/2018) Active Problems: 
  Alcoholic cirrhosis of liver with ascites ([facility] (10/8/2018) Esophageal varices without bleeding ([facility] (10/8/2018) Essential hypertension (10/30/2018) Leukocytosis (3/26/2019) SBP (spontaneous bacterial peritonitis) ([address] (3/26/2019) Nausea & vomiting (3/26/2019) Hyperkalemia (3/26/2019) NATE (acute kidney injury) ([facility] (3/26/2019) Normocytic anemia (3/26/2019) 1. NATE 
- Cr improving - Suspect component of HRS 
- s/p Lasix for edema 
- Consider starting midodrine/octreotide 
- ok to use spironolactone - Not a candidate for tolvaptan due to cirrhosis 2. Hyponatremia - worse yesterday. Supposed to be on fluid restriction but family does not think it is strict, been getting plenty of fluids, ice, protein supplement - Spoke with nurse to make sure fluid restriction followed - stressed to patient importance of fluid restriction 
- stable last 24 hrs 3. ETOH Cirrhosis with ascites - recent paracentesis 3/29 - 8 liters 
- on Xifaxan per GI. Not on lactulose due to recent diarrhea 
- followed by [facility] hepatology clinic for eval for liver transplant 
- ok to start aldactone - hyperkalemia resolved

## 2019-04-01 NOTE — PROGRESS NOTES
CM spoke with Emmanuel Barrett, who stated that pt needs to transfer to [facility] for liver transplant eval.  Per  [employer] [facility]), (Ros-pts CM) [phone] ext [phone], [facility] is not in network so pt would be considered self pay. Andreas Noland informed. CM also  informed ROCIO Julien at [facility] local office [phone]  Chel Sibley recommended calling  [facility] in [facility] and [facility] in [facility]. CM reached back out to [employer] with Tax ID #s for both facilities. [facility] not in network. Rodriguez Stinson, at [facility]  will have to check on [facility] and return call tomorrow morning with information as to whether that facility is in network. Rodriguez Stinson, from [facility], asked CM to fax paperwork regarding transplant .[phone]. Rodriguez Stinson stated that she will review information with Medical Director to see if a single case agreement can be arranged. CM will wait to hear from [facility] regarding Lalo burnie and single case agreement approval before proceeding with transfer. Pt and family will be informed. CM will proceed as indicated by pt/family and medical team.  CM received another call from [facility]. They have an accepting provider at [address] [place] [address]. Andreas Noland will be notified once all necessary information is obtained-per request.  Connor Toth was informed of the above and CM stated that information will not be obtained until Tuesday.

## 2019-04-02 NOTE — PROGRESS NOTES
TRANSFER - OUT REPORT: 
 
Verbal report given to Sejal Culp at [facility] [phone] on Leonid Corral  being transferred to [facility]  for screening of candidacy for liver transplant. Report consisted of patients Situation, Background, Assessment and  
Recommendations(SBAR). Information from the following report(s) SBAR was reviewed with the receiving nurse. Opportunity for questions and clarification was provided. Patient transported with: 
 [facility]

## 2019-04-05 NOTE — PROGRESS NOTES
Patient has transferred to [address] for liver transplant evaluation Episode closed Removed self from care team

## 2019-04-19 NOTE — PROGRESS NOTES
Patient is Alert and discharge instructions given and received. Patient being prepared for discharge at this time. 4,400 ml of paracentesis fluid total for this visit.

## 2019-04-19 NOTE — DISCHARGE INSTRUCTIONS
[address]  Department of Interventional Radiology  [facility]  [phone] Office  [phone] Fax    PARACENTESIS DISCHARGE INSTRUCTIONS    General Information:  During this procedure, the doctor will insert a needle into the abdomen to drain fluid. After the procedure, you will be able to take a deep breath much easier. The site of the puncture may ooze the first day. This will decrease and eventually stop. Paracentesis (draining fluid from the abdomen) sometimes makes patients hypotensive (low blood pressure). Your doctor may order for you to receive fluids or albumin (a volume booster) during the procedure through an IV site. Home Care Instructions:  Keep the puncture site clean and dry. No tub baths or swimming until puncture site heals. Showering is acceptable. Resume your normal diet, and resume your normal activity slowly and as you tolerate. If you are short of breath, rest. If shortness of breath does not ease, please call your ordering doctor. Fluid can re-accumulate in the chest and/or in the abdomen. If this should occur, your doctor needs to know as you may need to have the procedure done again. Call If:     You should call your Physician and/or the Radiology Nurse if you notice any signs of infection, like pus draining, or if it is swollen or reddened. Also call if you have a fever, or if you are bleeding from the puncture site more than a small amount on the dressing. Call if the puncture site keeps draining fluid. Some oozing is to be expected, but should slow and then stop. Call if you feel like you have pressure in your abdomen. SEEK IMMEDIATE CARE OR CALL 911 IF YOU SUDDENLY HAVE TROUBLE BREATHING, OR IF YOUR LIPS TURN BLUE, OR IF YOU NOTICE BLOOD IN YOUR SPUTUM. Follow-Up Instructions: Please see your ordering doctor as he/she has requested. To Reach Us:   If you have any questions about your procedure, please call the Interventional Radiology department at [phone]. After business hours (5pm) and weekends, call the answering service at [phone] and ask for the Radiologist on call to be paged. Interventional Radiology General Nurse Discharge    After general anesthesia or intravenous sedation, for 24 hours or while taking prescription Narcotics:  · Limit your activities  · Do not drive and operate hazardous machinery  · Do not make important personal or business decisions  · Do  not drink alcoholic beverages  · If you have not urinated within 8 hours after discharge, please contact your surgeon on call. * Please give a list of your current medications to your Primary Care Provider. * Please update this list whenever your medications are discontinued, doses are     changed, or new medications (including over-the-counter products) are added. * Please carry medication information at all times in case of emergency situations. These are general instructions for a healthy lifestyle:    No smoking/ No tobacco products/ Avoid exposure to second hand smoke  Surgeon General's Warning:  Quitting smoking now greatly reduces serious risk to your health. Obesity, smoking, and sedentary lifestyle greatly increases your risk for illness  A healthy diet, regular physical exercise & weight monitoring are important for maintaining a healthy lifestyle    You may be retaining fluid if you have a history of heart failure or if you experience any of the following symptoms:  Weight gain of 3 pounds or more overnight or 5 pounds in a week, increased swelling in our hands or feet or shortness of breath while lying flat in bed. Please call your doctor as soon as you notice any of these symptoms; do not wait until your next office visit.     Recognize signs and symptoms of STROKE:  F-face looks uneven    A-arms unable to move or move unevenly    S-speech slurred or non-existent    T-time-call 911 as soon as signs and symptoms begin-DO NOT go       Back to bed or wait to see if you get better-TIME IS BRAIN.         Date: 4/19/2019  Discharging Nurse: Corrie Sam

## 2019-04-24 NOTE — PROGRESS NOTES
I met patient on 4-23-19 with Dr Yariel Paz for Oncology Consult. New Patient Abstract     Reason for Referral: Unspecified cirrhosis of liver; Liver cell carcinoma     Referring Provider: ROCIO Tom     Primary Care Provider: Nathanael Barth MD     Family History of Cancer/Hematologic Disorders:  None reported     Presenting Symptoms:  Weakness, abdominal pain, recurrent ascites requiring frequent paracentesis, nausea and vomiting     Narrative with recent with Results/Procedures/Biopsies and Dates completed: Mr. Kofi Acosta is a 69year old white male with a history of ETOH abuse (sober since July of 2018), ileum ulcer, esophageal varices, HTN, alcoholic cirrhosis of the liver with ascites, left pleural effusion, NATE, cardiac murmur, spontaneous bacterial peritonitis, hyperkalemia, leukocytosis, hyponatremia, normocytic anemia, orthopedic surgeries and appendectomy. He has been followed for his cirrhosis/ascites by [address] [place] [address] in [place]. He presented to the [facility] ED on 3/26/19 reporting nausea, vomiting and abdominal pain following and outpatient paracentesis on 3/25/19 which removed 6.4 liters of fluid. Work-up in the ED included CT of the abdomen and pelvis which identified macronodular cirrhosis with sequela of portal hypertension with mild splenomegaly and with perigastric and periesophageal varices evident; large volume of intra-abdominal ascites; moderately sized left-sided pleural effusion; 1.9 cm intermediate density lesion of the left kidney which is incompletely characterized; diffuse wall thickening of the ascending and transverse colon; and mild wall thickening of the small bowel favored related to fluid status and cirrhosis. Lab work showed hyponatremia with sodium of 118 and hyperkalemia with potassium of 6.0, as well as, acute kidney injury with BUN 28, creatinine 1.87 and GFR 38.  He was admitted and started on NS IVF and IV albumin.      GI and nephrology were consulted during admission. Patient underwent ultrasound guided paracentesis on 3/29/19 with removal of 8 liters. Based on current lab work, MELD score was up to 25 from a previous score of 10 on 2/28/19, and on 4/2/19, patient was transferred to [facility] to pursue TIPS and/or evaluation for liver transplant. On 4/3/19, he had an additional paracentesis with removal of 4 liters of fluid, and an MRI of the abdomen which demonstrated 2 hepatic lesions suspicious for malignant degeneration of a dysplastic nodule into hepatocellular carcinoma and several bilobar hepatic nodules representing dysplastic nodules or well differentiated HCC. Lab work on 4/4/19 showed patients AFT elevated at 32. 5.      On 4/5/19, patient underwent another paracentesis with removal of 3.4 liters of fluid, followed by an ultrasound guided biopsy of a 1.8 cm left hepatic lobe lesion with pathology revealing moderately differentiated hepatocellular carcinoma. He was discharged on 4/8/19 with updated MELD score of 23. MD noted that he did not feel patient would tolerate local regional therapy and considerations would be transplant with his natural MELD score given his blood group of B; however, if he stabilized and labs improved local regional therapy could be reconsidered. MD noted that patient does not have transplant benefits at this time, and family is working to obtain transplant benefits and/or switching insurance to [employer]. Based on insurance approval for transplant, transplant evaluation plus or minus [facility] [address] treatment would be considered.      Mr. Placido Canas now presents to [facility], per referral from hepatology, for oncology evaluation and treatment of biopsy proven hepatocellular carcinoma. He continues to hope for transplant evaluation following oncology treatment.       ABDOMINAL ULTRASOUND 12/28/2018  FINDINGS: There is a cirrhotic liver morphology with perihepatic ascites. The gallbladder wall is thickened. This measures 5 mm.  No gallstones. There is no bile duct dilatation. The common bile duct measures 5 mm. The main portal vein demonstrates hepatopedal flow. The right kidney measures 9.1 cm in length. The left kidney measures 10.6 cm. There is a simple cyst within the left kidney measuring 5.6 x 5.6 x 5.5 cm. There is no hydronephrosis. There is no evidence of a renal mass. There is no ascites. The aorta and inferior vena cava are normal in caliber. 4 quadrant ascites noted. IMPRESSION:   1. Cirrhotic liver morphology with 4 quadrant ascites. 2. Thickening of the gallbladder wall, which could be the sequela of portal venous hypertension. 3. Simple cyst left kidney.     CT CHEST WITHOUT CONTRAST 1/24/2019  FINDINGS:  CT CHEST: The base of the neck is unremarkable in appearance.  No axillary, or mediastinal  lymphadenopathy is seen.  Evaluation of the john is limited due to noncontrast technique although no obvious bulky hilar changes are seen.  The thoracic aorta is normal in caliber. Multiple densities are seen about the distal esophagus which appear to continue in a linear fashion into the abdomen and be continuous with vascular appearing structures at this level which are therefore felt to represent large paraesophageal varices. Evaluation with lung windows demonstrates no pulmonary nodules or masses. A moderate left pleural effusion is seen which appears to layer dependently. This is felt to be increased from the prior study. Adjacent airspace changes are seen in the lower lobe on image 42 which are felt to represent compressive atelectasis. The airway leading to this portion of lung is patent.  Lungs are expanded without evidence for pneumothorax.  No acute osseous abnormality is seen. Limited evaluation of the upper abdomen demonstrates clearly cirrhotic changes of the liver. The liver is heterogeneous. This may be due to cirrhosis. The possibility of a small lesion cannot be excluded on this noncontrast study.  The spleen is enlarged measuring 14.8 cm in length and large ascites is seen in the partially visualized abdomen. Once again, vermiform appearing densities are seen in the central abdomen which are favored to represent varices. IMPRESSION:    1.  Moderate left pleural effusion which layers dependently although does appear increased from the prior study. Assessment for underlying etiology is limited due to the lack of administered contrast although no clearly worrisome or acute associated features are seen. It is possible that this is related to the patient's large ascites due to a pleuroperitoneal communication which is not clearly evident. 2. Clearly cirrhotic liver. Heterogeneity is seen which may be due to fibrosis although the presence of a small lesion is difficult to exclude with certainty especially on this noncontrast study. This would be best further assessed with a hepatic protocol MRI.   3. Splenomegaly, suspected varices and large ascites suggesting portal hypertension.       2/5/2019 10:40 2/28/2019 10:35 4/4/2019   AFP, TUMOR MARKER 47.00 (H) 42.40 (H) 27.5 (H)      ABDOMINAL ULTRASOUND 3/22/2019  FINDINGS: There is a cirrhotic liver morphology with coarsened echotexture and perihepatic ascites. Multiple parenchymal nodules are present within the right lobe measuring 2.7, 2.3, and 2.1 cm. There is no bile duct dilatation. The common bile duct measures 4 mm. The gallbladder wall is thickened measuring 5 mm. The spleen is mildly enlarged measuring 13 cm. The right kidney measures 10.9 cm in length. The left kidney measures 11.7 cm. There is no hydronephrosis. There is a cyst within the left kidney measuring 5.4 x 5.8 x 5.1 cm. There is no ascites. The aorta and inferior vena cava are normal in caliber. 4 quadrant ascites seen as well as a left pleural effusion. IMPRESSION:   1. Cirrhotic liver morphology with probable portal venous hypertension. There is thickening of the gallbladder wall as well as splenomegaly. 2. Focal nodular lesions within the right lobe of the liver. CT abdomen, hepatic protocol, or MRI abdomen, hepatic protocol, recommended for further evaluation. 3. Left renal cyst.   4. 4 quadrant ascites. 5. Left pleural effusion.     CT OF THE ABDOMEN AND PELVIS WITHOUT CONTRAST 3/26/2019  FINDINGS:  LOWER THORAX: Moderately sized left-sided pleural effusion with atelectatic change of the left lung base. Extensive coronary artery calcifications. LIVER: Atrophic liver with prominent macronodular contour consistent with cirrhosis, slight caudate lobe hypertrophy. Cannot evaluate for hepatic lesions with the lack of IV contrast.   BILIARY SYSTEM: The gallbladder is contracted with slight wall thickening and with evidence of cholelithiasis.   SPLEEN: Borderline splenomegaly.   PANCREAS: No pancreatic mass. No pancreatic duct dilation.   ADRENALS: No adrenal nodule or adrenal hypertrophy. URINARY SYSTEM: Left lower pole exophytic renal cyst measuring 3 cm. Left superior pole exophytic 1.9 cm intermediate density lesion which is incompletely characterized. FLUID: Rabia Lame is a large volume of ascites within the abdomen and pelvis. REPRODUCTIVE ORGANS: Unremarkable. BOWEL: There is diffuse wall thickening of the ascending and transverse colon which may be related to patient's cirrhosis. No evidence of bowel structure. There is diffuse wall thickening of the small bowel.   VASCULAR/NODES: No aortic or iliac artery aneurysm. No lymphadenopathy. Perigastric and paraesophageal varices are noted. BONES/SUBCUTANEOUS TISSUE: Mild bilateral gynecomastia. IMPRESSION:  1. Macronodular cirrhosis with sequela of portal hypertension with mild splenomegaly and with perigastric and periesophageal varices evident. Large volume of intra-abdominal ascites. 2. Moderately sized left-sided pleural effusion.    3. 1.9 cm intermediate density lesion of the left kidney which is exactly characterize recommend follow-up with MRI or renal mass CT. 4. Diffuse wall thickening of the ascending and transverse colon and mild wall thickening of the small bowel favored related to fluid status and cirrhosis.     CT HEAD WITHOUT CONTRAST 3/31/2019  FINDINGS: There is a remote right basal ganglia lacunar infarction with mild ex vacuo dilatation of the frontal horn right lateral ventricle. Ventricles are otherwise appropriate for age. There are no extra-axial fluid collections. No evidence of acute intraparenchymal hemorrhage or mass effect is identified. There is no evidence to suggest an acute major territorial infarct. The bony calvarium is intact. The visualized mastoid air cells and paranasal sinuses are well pneumatized and aerated. IMPRESSION:  1. Remote right basal ganglia lacunar infarction. 2. Otherwise unremarkable unenhanced CT scan of the brain.     BRAIN MRI 3/31/2019  FINDINGS: There is encephalomalacia in the right lentiform nucleus at the site of a prior infarction. The ventricles are within normal limits for the degree of global brain parenchymal volume loss. There is no midline shift. The basilar cisterns are patent. There is no cerebellar tonsillar ectopia or herniation. There are T2 hyperintensities in the periventricular and subcortical white matter, a nonspecific finding which likely represents chronic microangiopathy. Left maxillary sinus mucosal thickening and small volume fluid. There is chronic hemosiderin deposition at the site of the above-described right lentiform nucleus encephalomalacia. Diffusion imaging shows no evidence of acute infarction or other acute abnormality. The expected large vascular flow voids are preserved. There are no suspicious osseous lesions. IMPRESSION:  1. No evidence of acute infarction.   2. Chronic features as above.     MRI ABDOMEN W AND W/O CONTRAST 4/3/2019           LIMITED ABDOMINAL ULTRASOUND EXAM 4/4/2019         SURGICAL PATHOLOGY REPORT 4/5/19         3/28/2019 06:39 3/29/2019 05:26 3/30/2019 04:58 4/11/2019 12:24   WBC 7.9 7.1 6.3 6.0   RBC 3.13 (L) 3.06 (L) 3.12 (L) 3.26 (L)   HGB 9.4 (L) 9.0 (L) 9.4 (L) 9.5 (L)   HCT 27.5 (L) 26.7 (L) 27.2 (L) 29.6 (L)   MCV 87.9 87.3 87.2 90.8   MCH 30.0 29.4 30.1 29.1   MCHC 34.2 33.7 34.6 32.1   RDW 15.5 (H) 15.2 (H) 15.2 (H) 16.3 (H)   PLATELET 082 (L) 894 (L) 122 (L) 220   MPV 10.0 10.5 10.1 10.1   NEUTROPHILS 70 65 66 67   LYMPHOCYTES 7 (L) 6 (L) 6 (L) 9 (L)   MONOCYTES 20 (H) 23 (H) 24 (H) 20 (H)   EOSINOPHILS 1 3 2 2   BASOPHILS 0 0 0 1   IMMATURE GRANULOCYTES 2 2 1 1   DF AUTOMATED AUTOMATED AUTOMATED AUTOMATED   ABSOLUTE NRBC 0.00 0.00 0.00 0.00   ABS. NEUTROPHILS 5.6 4.6 4.2 4.0   ABS. IMM. GRANS. 0.1 0.1 0.1 0.1   ABS. LYMPHOCYTES 0.6 0.5 0.4 (L) 0.5   ABS. MONOCYTES 1.5 (H) 1.7 (H) 1.5 (H) 1.2   ABS. EOSINOPHILS 0.1 0.2 0.2 0.1   ABS.  BASOPHILS 0.0 0.0 0.0 0.0        3/27/2019 13:59 3/27/2019 21:30 3/28/2019 06:39 3/29/2019 05:26 3/30/2019 04:58 3/30/2019 20:31 3/31/2019 03:50 3/31/2019 09:05 3/31/2019 21:40 4/1/2019 08:57 4/11/2019 12:24   Sodium 119 (LL) 119 (LL) 119 (LL) 121 (LL) 126 (L) 122 (LL) 123 (LL) 120 (LL) 120 (LL) 120 (LL) 128 (L)   Potassium 5.6 (H) 5.1 4.7 4.2 3.5 3.7 3.6 3.6 3.9 3.9 4.9   Chloride 90 (L) 88 (L) 89 (L) 89 (L) 90 (L) 87 (L) 88 (L) 85 (L) 85 (L) 86 (L) 94 (L)   CO2 21 23 24 24 25 28 26 25 28 27 27   Anion gap 8 8 6 (L) 8 11 7 9 10 7 7 7   Glucose 98 110 (H) 97 109 (H) 133 (H) 133 (H) 115 (H) 217 (H) 177 (H) 173 (H) 134 (H)   BUN 30 (H) 29 (H) 29 (H) 27 (H) 26 (H) 26 (H) 27 (H) 27 (H) 28 (H) 27 (H) 16   Creatinine 1.47 1.42 1.60 (H) 1.59 (H) 1.55 (H) 1.65 (H) 1.45 1.62 (H) 1.35 1.18 1.07   Calcium 8.3 8.0 (L) 8.4 8.5 8.5 8.2 (L) 8.2 (L) 8.2 (L) 7.9 (L) 8.1 (L) 8.3   Magnesium                     2.5 (H)   GFR est non-AA 50 (L) 52 (L) 46 (L) 46 (L) 47 (L) 44 (L) 51 (L) 45 (L) 55 (L) >60 >60   GFR est AA >60 >60 55 (L) 55 (L) 57 (L) 53 (L) >60 54 (L) >60 >60 >60   Bilirubin, total 2.2 (H) 2.0 (H) 1.9 (H)               1.6 (H)   Protein, total 5.8 (L) 5.8 (L) 5.8 (L)               6.5   Albumin 3.1 (L) 3.0 (L) 3.1 (L)               3.2   Globulin 2.7 2.8 2.7               3.3   A-G Ratio 1.1 (L) 1.1 (L) 1.1 (L)               1.0 (L)   ALT (SGPT) 31 31 32               48   AST 63 (H) 58 (H) 56 (H)               79 (H)   Alk. phosphatase 94 111 113               302 (H)   Ammonia                     18   Osmolality, serum/plasma                            Notes from Referring Provider: None     Other Pertinent Information: None     Presented at Tumor Board: No           Electronically signed by Mendel Cassette, RN at 04/22/19 4203   Note Details     Author Mendel Cassette, RN File Time 04/22/19 1245   Author Type Registered Nurse Status Signed   Last  Mendel Cassette, RN Service (none)       Abstract on 4/22/2019   New Patient Abstract     Reason for Referral: Unspecified cirrhosis of liver; Liver cell carcinoma     Referring Provider: ROCIO Fermin     Primary Care Provider: Kate Rodriguez MD     Family History of Cancer/Hematologic Disorders:  None reported     Presenting Symptoms:  Weakness, abdominal pain, recurrent ascites requiring frequent paracentesis, nausea and vomiting     Narrative with recent with Results/Procedures/Biopsies and Dates completed: Mr. Migel Moser is a 69year old white male with a history of ETOH abuse (sober since July of 2018), ileum ulcer, esophageal varices, HTN, alcoholic cirrhosis of the liver with ascites, left pleural effusion, NAET, cardiac murmur, spontaneous bacterial peritonitis, hyperkalemia, leukocytosis, hyponatremia, normocytic anemia, orthopedic surgeries and appendectomy. He has been followed for his cirrhosis/ascites by [address] [place] [address] in [place]. He presented to the [facility] ED on 3/26/19 reporting nausea, vomiting and abdominal pain following and outpatient paracentesis on 3/25/19 which removed 6.4 liters of fluid.  Work-up in the ED included CT of the abdomen and pelvis which identified macronodular cirrhosis with sequela of portal hypertension with mild splenomegaly and with perigastric and periesophageal varices evident; large volume of intra-abdominal ascites; moderately sized left-sided pleural effusion; 1.9 cm intermediate density lesion of the left kidney which is incompletely characterized; diffuse wall thickening of the ascending and transverse colon; and mild wall thickening of the small bowel favored related to fluid status and cirrhosis. Lab work showed hyponatremia with sodium of 118 and hyperkalemia with potassium of 6.0, as well as, acute kidney injury with BUN 28, creatinine 1.87 and GFR 38. He was admitted and started on NS IVF and IV albumin.      GI and nephrology were consulted during admission. Patient underwent ultrasound guided paracentesis on 3/29/19 with removal of 8 liters. Based on current lab work, MELD score was up to 25 from a previous score of 10 on 2/28/19, and on 4/2/19, patient was transferred to [facility] to pursue TIPS and/or evaluation for liver transplant. On 4/3/19, he had an additional paracentesis with removal of 4 liters of fluid, and an MRI of the abdomen which demonstrated 2 hepatic lesions suspicious for malignant degeneration of a dysplastic nodule into hepatocellular carcinoma and several bilobar hepatic nodules representing dysplastic nodules or well differentiated HCC. Lab work on 4/4/19 showed patients AFT elevated at 32. 5.      On 4/5/19, patient underwent another paracentesis with removal of 3.4 liters of fluid, followed by an ultrasound guided biopsy of a 1.8 cm left hepatic lobe lesion with pathology revealing moderately differentiated hepatocellular carcinoma. He was discharged on 4/8/19 with updated MELD score of 23.  MD noted that he did not feel patient would tolerate local regional therapy and considerations would be transplant with his natural MELD score given his blood group of B; however, if he stabilized and labs improved local regional therapy could be reconsidered. MD noted that patient does not have transplant benefits at this time, and family is working to obtain transplant benefits and/or switching insurance to [employer]. Based on insurance approval for transplant, transplant evaluation plus or minus [facility] [address] treatment would be considered.      Mr. Jasmina Olivares now presents to [facility], per referral from hepatology, for oncology evaluation and treatment of biopsy proven hepatocellular carcinoma. He continues to hope for transplant evaluation following oncology treatment.       ABDOMINAL ULTRASOUND 12/28/2018  FINDINGS: There is a cirrhotic liver morphology with perihepatic ascites. The gallbladder wall is thickened. This measures 5 mm. No gallstones. There is no bile duct dilatation. The common bile duct measures 5 mm. The main portal vein demonstrates hepatopedal flow. The right kidney measures 9.1 cm in length. The left kidney measures 10.6 cm. There is a simple cyst within the left kidney measuring 5.6 x 5.6 x 5.5 cm. There is no hydronephrosis. There is no evidence of a renal mass. There is no ascites. The aorta and inferior vena cava are normal in caliber. 4 quadrant ascites noted. IMPRESSION:   1. Cirrhotic liver morphology with 4 quadrant ascites. 2. Thickening of the gallbladder wall, which could be the sequela of portal venous hypertension. 3. Simple cyst left kidney.     CT CHEST WITHOUT CONTRAST 1/24/2019  FINDINGS:  CT CHEST: The base of the neck is unremarkable in appearance.  No axillary, or mediastinal  lymphadenopathy is seen.  Evaluation of the john is limited due to noncontrast technique although no obvious bulky hilar changes are seen.  The thoracic aorta is normal in caliber.  Multiple densities are seen about the distal esophagus which appear to continue in a linear fashion into the abdomen and be continuous with vascular appearing structures at this level which are therefore felt to represent large paraesophageal varices. Evaluation with lung windows demonstrates no pulmonary nodules or masses. A moderate left pleural effusion is seen which appears to layer dependently. This is felt to be increased from the prior study. Adjacent airspace changes are seen in the lower lobe on image 42 which are felt to represent compressive atelectasis. The airway leading to this portion of lung is patent.  Lungs are expanded without evidence for pneumothorax.  No acute osseous abnormality is seen. Limited evaluation of the upper abdomen demonstrates clearly cirrhotic changes of the liver. The liver is heterogeneous. This may be due to cirrhosis. The possibility of a small lesion cannot be excluded on this noncontrast study. The spleen is enlarged measuring 14.8 cm in length and large ascites is seen in the partially visualized abdomen. Once again, vermiform appearing densities are seen in the central abdomen which are favored to represent varices. IMPRESSION:    1.  Moderate left pleural effusion which layers dependently although does appear increased from the prior study. Assessment for underlying etiology is limited due to the lack of administered contrast although no clearly worrisome or acute associated features are seen. It is possible that this is related to the patient's large ascites due to a pleuroperitoneal communication which is not clearly evident. 2. Clearly cirrhotic liver. Heterogeneity is seen which may be due to fibrosis although the presence of a small lesion is difficult to exclude with certainty especially on this noncontrast study.  This would be best further assessed with a hepatic protocol MRI.   3. Splenomegaly, suspected varices and large ascites suggesting portal hypertension.       2/5/2019 10:40 2/28/2019 10:35 4/4/2019   AFP, TUMOR MARKER 47.00 (H) 42.40 (H) 27.5 (H)      ABDOMINAL ULTRASOUND 3/22/2019  FINDINGS: There is a cirrhotic liver morphology with coarsened echotexture and perihepatic ascites. Multiple parenchymal nodules are present within the right lobe measuring 2.7, 2.3, and 2.1 cm. There is no bile duct dilatation. The common bile duct measures 4 mm. The gallbladder wall is thickened measuring 5 mm. The spleen is mildly enlarged measuring 13 cm. The right kidney measures 10.9 cm in length. The left kidney measures 11.7 cm. There is no hydronephrosis. There is a cyst within the left kidney measuring 5.4 x 5.8 x 5.1 cm. There is no ascites. The aorta and inferior vena cava are normal in caliber. 4 quadrant ascites seen as well as a left pleural effusion. IMPRESSION:   1. Cirrhotic liver morphology with probable portal venous hypertension. There is thickening of the gallbladder wall as well as splenomegaly. 2. Focal nodular lesions within the right lobe of the liver. CT abdomen, hepatic protocol, or MRI abdomen, hepatic protocol, recommended for further evaluation. 3. Left renal cyst.   4. 4 quadrant ascites. 5. Left pleural effusion.     CT OF THE ABDOMEN AND PELVIS WITHOUT CONTRAST 3/26/2019  FINDINGS:  LOWER THORAX: Moderately sized left-sided pleural effusion with atelectatic change of the left lung base. Extensive coronary artery calcifications. LIVER: Atrophic liver with prominent macronodular contour consistent with cirrhosis, slight caudate lobe hypertrophy. Cannot evaluate for hepatic lesions with the lack of IV contrast.   BILIARY SYSTEM: The gallbladder is contracted with slight wall thickening and with evidence of cholelithiasis.   SPLEEN: Borderline splenomegaly.   PANCREAS: No pancreatic mass. No pancreatic duct dilation.   ADRENALS: No adrenal nodule or adrenal hypertrophy. URINARY SYSTEM: Left lower pole exophytic renal cyst measuring 3 cm. Left superior pole exophytic 1.9 cm intermediate density lesion which is incompletely characterized. FLUID: Sultana Ales is a large volume of ascites within the abdomen and pelvis. REPRODUCTIVE ORGANS: Unremarkable.   BOWEL: There is diffuse wall thickening of the ascending and transverse colon which may be related to patient's cirrhosis. No evidence of bowel structure. There is diffuse wall thickening of the small bowel.   VASCULAR/NODES: No aortic or iliac artery aneurysm. No lymphadenopathy. Perigastric and paraesophageal varices are noted. BONES/SUBCUTANEOUS TISSUE: Mild bilateral gynecomastia. IMPRESSION:  1. Macronodular cirrhosis with sequela of portal hypertension with mild splenomegaly and with perigastric and periesophageal varices evident. Large volume of intra-abdominal ascites. 2. Moderately sized left-sided pleural effusion. 3. 1.9 cm intermediate density lesion of the left kidney which is exactly characterize recommend follow-up with MRI or renal mass CT. 4. Diffuse wall thickening of the ascending and transverse colon and mild wall thickening of the small bowel favored related to fluid status and cirrhosis.     CT HEAD WITHOUT CONTRAST 3/31/2019  FINDINGS: There is a remote right basal ganglia lacunar infarction with mild ex vacuo dilatation of the frontal horn right lateral ventricle. Ventricles are otherwise appropriate for age. There are no extra-axial fluid collections. No evidence of acute intraparenchymal hemorrhage or mass effect is identified. There is no evidence to suggest an acute major territorial infarct. The bony calvarium is intact. The visualized mastoid air cells and paranasal sinuses are well pneumatized and aerated. IMPRESSION:  1. Remote right basal ganglia lacunar infarction. 2. Otherwise unremarkable unenhanced CT scan of the brain.     BRAIN MRI 3/31/2019  FINDINGS: There is encephalomalacia in the right lentiform nucleus at the site of a prior infarction. The ventricles are within normal limits for the degree of global brain parenchymal volume loss. There is no midline shift. The basilar cisterns are patent. There is no cerebellar tonsillar ectopia or herniation.  There are T2 hyperintensities in the periventricular and subcortical white matter, a nonspecific finding which likely represents chronic microangiopathy. Left maxillary sinus mucosal thickening and small volume fluid. There is chronic hemosiderin deposition at the site of the above-described right lentiform nucleus encephalomalacia. Diffusion imaging shows no evidence of acute infarction or other acute abnormality. The expected large vascular flow voids are preserved. There are no suspicious osseous lesions. IMPRESSION:  1. No evidence of acute infarction. 2. Chronic features as above.     MRI ABDOMEN W AND W/O CONTRAST 4/3/2019           LIMITED ABDOMINAL ULTRASOUND EXAM 4/4/2019         SURGICAL PATHOLOGY REPORT 4/5/19         3/28/2019 06:39 3/29/2019 05:26 3/30/2019 04:58 4/11/2019 12:24   WBC 7.9 7.1 6.3 6.0   RBC 3.13 (L) 3.06 (L) 3.12 (L) 3.26 (L)   HGB 9.4 (L) 9.0 (L) 9.4 (L) 9.5 (L)   HCT 27.5 (L) 26.7 (L) 27.2 (L) 29.6 (L)   MCV 87.9 87.3 87.2 90.8   MCH 30.0 29.4 30.1 29.1   MCHC 34.2 33.7 34.6 32.1   RDW 15.5 (H) 15.2 (H) 15.2 (H) 16.3 (H)   PLATELET 081 (L) 103 (L) 122 (L) 220   MPV 10.0 10.5 10.1 10.1   NEUTROPHILS 70 65 66 67   LYMPHOCYTES 7 (L) 6 (L) 6 (L) 9 (L)   MONOCYTES 20 (H) 23 (H) 24 (H) 20 (H)   EOSINOPHILS 1 3 2 2   BASOPHILS 0 0 0 1   IMMATURE GRANULOCYTES 2 2 1 1   DF AUTOMATED AUTOMATED AUTOMATED AUTOMATED   ABSOLUTE NRBC 0.00 0.00 0.00 0.00   ABS. NEUTROPHILS 5.6 4.6 4.2 4.0   ABS. IMM. GRANS. 0.1 0.1 0.1 0.1   ABS. LYMPHOCYTES 0.6 0.5 0.4 (L) 0.5   ABS. MONOCYTES 1.5 (H) 1.7 (H) 1.5 (H) 1.2   ABS. EOSINOPHILS 0.1 0.2 0.2 0.1   ABS.  BASOPHILS 0.0 0.0 0.0 0.0        3/27/2019 13:59 3/27/2019 21:30 3/28/2019 06:39 3/29/2019 05:26 3/30/2019 04:58 3/30/2019 20:31 3/31/2019 03:50 3/31/2019 09:05 3/31/2019 21:40 4/1/2019 08:57 4/11/2019 12:24   Sodium 119 (LL) 119 (LL) 119 (LL) 121 (LL) 126 (L) 122 (LL) 123 (LL) 120 (LL) 120 (LL) 120 (LL) 128 (L)   Potassium 5.6 (H) 5.1 4.7 4.2 3.5 3.7 3.6 3.6 3.9 3.9 4.9   Chloride 90 (L) 88 (L) 89 (L) 89 (L) 90 (L) 87 (L) 88 (L) 85 (L) 85 (L) 86 (L) 94 (L)   CO2 21 23 24 24 25 28 26 25 28 27 27   Anion gap 8 8 6 (L) 8 11 7 9 10 7 7 7   Glucose 98 110 (H) 97 109 (H) 133 (H) 133 (H) 115 (H) 217 (H) 177 (H) 173 (H) 134 (H)   BUN 30 (H) 29 (H) 29 (H) 27 (H) 26 (H) 26 (H) 27 (H) 27 (H) 28 (H) 27 (H) 16   Creatinine 1.47 1.42 1.60 (H) 1.59 (H) 1.55 (H) 1.65 (H) 1.45 1.62 (H) 1.35 1.18 1.07   Calcium 8.3 8.0 (L) 8.4 8.5 8.5 8.2 (L) 8.2 (L) 8.2 (L) 7.9 (L) 8.1 (L) 8.3   Magnesium                     2.5 (H)   GFR est non-AA 50 (L) 52 (L) 46 (L) 46 (L) 47 (L) 44 (L) 51 (L) 45 (L) 55 (L) >60 >60   GFR est AA >60 >60 55 (L) 55 (L) 57 (L) 53 (L) >60 54 (L) >60 >60 >60   Bilirubin, total 2.2 (H) 2.0 (H) 1.9 (H)               1.6 (H)   Protein, total 5.8 (L) 5.8 (L) 5.8 (L)               6.5   Albumin 3.1 (L) 3.0 (L) 3.1 (L)               3.2   Globulin 2.7 2.8 2.7               3.3   A-G Ratio 1.1 (L) 1.1 (L) 1.1 (L)               1.0 (L)   ALT (SGPT) 31 31 32               48   AST 63 (H) 58 (H) 56 (H)               79 (H)   Alk. phosphatase 94 111 113               302 (H)   Ammonia                     18   Osmolality, serum/plasma                            Notes from Referring Provider: None     Other Pertinent Information: None     Presented at Tumor Board: No           Electronically signed by Alan Sterling RN at 04/22/19 5777   Note Details     Author Alan Sterling RN File Time 04/22/19 3384   Author Type Registered Nurse Status Signed   Last  Alan Sterling RN Service (none)       Dr David Tan told patient he felt he was a candidate for liver transplant and will referral was made to [facility]. Dr David Tan also stated if transplant or consult cannot be done in a timely manner, we could always have patient consulted for Y90. Pt states he is needing paracentesis about every 2 weeks now and feels time is an issue.  Patient's daughter, Alexx Nye states she has been told referral and Prior Auth will need to be through the PCP for insurance/medicare purposes. I called Dr Kan Denis office yesterday at [phone] and April called Kaelyn back stating the referral and Prior Jovan Crabtree must come through our office. I have spoken with Gabino Alonso and will check to see if pt can see FC today to start expediting process of Prior Auth. Called Kaelyn and she is aware patient may need to come in this afternoon. Nurse navigation will keep in touch with patient and patient and daughter given my contact information as a nurse navigator.

## 2019-04-25 NOTE — PROGRESS NOTES
I called [employer] (pt's insurer) yesterday and spoke with representative and she stated in order for patient to have consult to [facility], Patient must be sent by PCP, not a specialist.     I called Dr Ty Izaguirre office and left message for MA that pt must have PCP referral and authorization. I called daughter Konstantin Shah this morning and gave her this information. I also informed Dr Cesar Hammer of this as well. I told daughter to contact me asap if Y90 procedure wants to be pursued and they feel PCP will not refer or this may be too lengthy. Kaelyn verbalized understanding-F/U in 1 month remains.

## 2019-05-02 NOTE — DISCHARGE INSTRUCTIONS
[address]  Department of Interventional Radiology  [facility]  [phone] Office  [phone] Fax    PARACENTESIS DISCHARGE INSTRUCTIONS    General Information:  During this procedure, the doctor will insert a needle into the abdomen to drain fluid. After the procedure, you will be able to take a deep breath much easier. The site of the puncture may ooze the first day. This will decrease and eventually stop. Paracentesis (draining fluid from the abdomen) sometimes makes patients hypotensive (low blood pressure). Home Care Instructions:  Keep the puncture site clean and dry. No tub baths or swimming until puncture site heals. Showering is acceptable. Resume your normal diet, and resume your normal activity slowly and as you tolerate. If you are short of breath, rest. If shortness of breath does not ease, please call your ordering doctor. Fluid can re-accumulate in the chest and/or in the abdomen. If this should occur, your doctor needs to know as you may need to have the procedure done again. Call If:     You should call your Physician and/or the Radiology Nurse if you notice any signs of infection, like pus draining, or if it is swollen or reddened. Also call if you have a fever, or if you are bleeding from the puncture site more than a small amount on the dressing. Call if the puncture site keeps draining fluid. Some oozing is to be expected, but should slow and then stop. Call if you feel like you have pressure in your abdomen. SEEK IMMEDIATE CARE OR CALL 911 IF YOU SUDDENLY HAVE TROUBLE BREATHING, OR IF YOUR LIPS TURN BLUE, OR IF YOU NOTICE BLOOD IN YOUR SPUTUM. Follow-Up Instructions: Please see your ordering doctor as he/she has requested. To Reach Us: If you have any questions about your procedure, please call the Interventional Radiology department at [phone].  After business hours (5pm) and weekends, call the answering service at [phone] and ask for the Radiologist on call to be paged. Interventional Radiology General Nurse Discharge      * Please give a list of your current medications to your Primary Care Provider. * Please update this list whenever your medications are discontinued, doses are     changed, or new medications (including over-the-counter products) are added. * Please carry medication information at all times in case of emergency situations. These are general instructions for a healthy lifestyle:    No smoking/ No tobacco products/ Avoid exposure to second hand smoke  Surgeon General's Warning:  Quitting smoking now greatly reduces serious risk to your health. Obesity, smoking, and sedentary lifestyle greatly increases your risk for illness  A healthy diet, regular physical exercise & weight monitoring are important for maintaining a healthy lifestyle    You may be retaining fluid if you have a history of heart failure or if you experience any of the following symptoms:  Weight gain of 3 pounds or more overnight or 5 pounds in a week, increased swelling in our hands or feet or shortness of breath while lying flat in bed. Please call your doctor as soon as you notice any of these symptoms; do not wait until your next office visit. Recognize signs and symptoms of STROKE:  F-face looks uneven    A-arms unable to move or move unevenly    S-speech slurred or non-existent    T-time-call 911 as soon as signs and symptoms begin-DO NOT go       Back to bed or wait to see if you get better-TIME IS BRAIN.             Date: 5/2/2019  Discharging Nurse: Lupe Nava RN

## 2019-05-13 NOTE — DISCHARGE INSTRUCTIONS
[address]  Department of Interventional Radiology  [address] [place] [address] [address] [facility]  [phone] Office  [phone] Fax    PARACENTESIS DISCHARGE INSTRUCTIONS    General Information:  During this procedure, the doctor will insert a needle into the abdomen to drain fluid. After the procedure, you will be able to take a deep breath much easier. The site of the puncture may ooze the first day. This will decrease and eventually stop. Paracentesis (draining fluid from the abdomen) sometimes makes patients hypotensive (low blood pressure). Your doctor may order for you to receive fluids or albumin (a volume booster) during the procedure through an IV site. Home Care Instructions:  Keep the puncture site clean and dry. No tub baths or swimming until puncture site heals. Showering is acceptable. Resume your normal diet, and resume your normal activity slowly and as you tolerate. If you are short of breath, rest. If shortness of breath does not ease, please call your ordering doctor. Fluid can re-accumulate in the chest and/or in the abdomen. If this should occur, your doctor needs to know as you may need to have the procedure done again. Call If:     You should call your Physician and/or the Radiology Nurse if you notice any signs of infection, like pus draining, or if it is swollen or reddened. Also call if you have a fever, or if you are bleeding from the puncture site more than a small amount on the dressing. Call if the puncture site keeps draining fluid. Some oozing is to be expected, but should slow and then stop. Call if you feel like you have pressure in your abdomen. SEEK IMMEDIATE CARE OR CALL 911 IF YOU SUDDENLY HAVE TROUBLE BREATHING, OR IF YOUR LIPS TURN BLUE, OR IF YOU NOTICE BLOOD IN YOUR SPUTUM. Follow-Up Instructions: Please see your ordering doctor as he/she has requested. To Reach Us:     If you have any questions about your procedure, please call the Interventional Radiology department at [phone]. After business hours (5pm) and weekends, call the answering service at [phone] and ask for the Radiologist on call to be paged. Si tiene Preguntas acerca del procedimiento, por favor llame al departamento de Radiología Intervencional al [phone]. Después de horas de oficina (5 pm) y los fines de [place], llamar al Rolando Christian al [phone] y pregunte por el Radiologo de [facility].        Date: 5/13/2019  Discharging Nurse: Ward Canales RN

## 2019-05-20 PROBLEM — K74.60 DECOMPENSATED HEPATIC CIRRHOSIS (HCC): Status: ACTIVE | Noted: 2019-01-01

## 2019-05-20 PROBLEM — K72.90 DECOMPENSATED HEPATIC CIRRHOSIS (HCC): Status: ACTIVE | Noted: 2019-01-01

## 2019-05-20 NOTE — ED TRIAGE NOTES
Pt has bilateral leg swelling that started 3 weeks ago. Recently had paracentesis 1 week ago. Seen at [facility] one week ago to be evaluated for liver transplant. Dr at [facility] recommend staying in hospital for diuresis. Has liver failure. Denies hx of CHF. Denies COPD.

## 2019-05-20 NOTE — ED PROVIDER NOTES
68 yo M w/ PMhx of Hepatocellular Carcinoma, Alcoholic cirrhosis w/ ascites presents w/ c/o worsening abdominal distention and worsening bilateral lower extremity swelling over the past 2-3 weeks. Wife states LE edema has been chronic issue but has progressively worsening recently. Patient states he has noticed increased dyspnea on exertion and dyspnea at rest as well. States he was evaluated at [facility] this past Wednesday by Hepatology (Dr. Joceline Rodriguez) in regards to liver transplant. States that Dr. Joceline Rodriguez informed him that he would likely need to be admitted upon returning home due to worsening ascites and LE edema. Pt last underwent paracentesis with IR on 5/13/19 w/ 6.5 L fluid removed. Pt denies abdominal pain, CP, fever, chills, dizziness, weakness, HA, confusion, numbness, tingling, weakness, hemoptysis. States he has follow-up w/ [place] in June. States he was also followed by Hepatology at [facility] [place] but was recently informed that \"he could no longer take part in care since [facility] is involved\". The history is provided by the patient. No  was used. Ankle swelling This is a new problem. The current episode started more than 1 week ago. The problem occurs constantly. The problem has not changed since onset. The pain is present in the left lower leg and right lower leg. Pertinent negatives include no numbness, full range of motion, no stiffness, no tingling, no itching, no back pain and no neck pain. Past Medical History:  
Diagnosis Date  Bronchitis  Cirrhosis of liver with ascites ([address]  Hypertension  Ulcer of ileum   
 ulcer Past Surgical History:  
Procedure Laterality Date  HX APPENDECTOMY  HX ORTHOPAEDIC    
 carpal tunnel  HX ORTHOPAEDIC    
 back surgery Family History:  
Problem Relation Age of Onset  COPD Mother  Heart Attack Father 52 Social History Socioeconomic History  Marital status:   
 Spouse name: Not on file  Number of children: Not on file  Years of education: Not on file  Highest education level: Not on file Occupational History  Not on file Social Needs  Financial resource strain: Not on file  Food insecurity:  
  Worry: Not on file Inability: Not on file  Transportation needs:  
  Medical: Not on file Non-medical: Not on file Tobacco Use  Smoking status: Never Smoker  Smokeless tobacco: Never Used Substance and Sexual Activity  Alcohol use: No  
  Comment: None since 7/2018  Drug use: No  
 Sexual activity: Not on file Lifestyle  Physical activity:  
  Days per week: Not on file Minutes per session: Not on file  Stress: Not on file Relationships  Social connections:  
  Talks on phone: Not on file Gets together: Not on file Attends Zoroastrianism service: Not on file Active member of club or organization: Not on file Attends meetings of clubs or organizations: Not on file Relationship status: Not on file  Intimate partner violence:  
  Fear of current or ex partner: Not on file Emotionally abused: Not on file Physically abused: Not on file Forced sexual activity: Not on file Other Topics Concern  Not on file Social History Narrative  and lives with wife. Has lived in [place] and [place]. Has one dog. Retired. Previously worked as . ALLERGIES: Hydromet [hydrocodone-homatropine] Review of Systems Constitutional: Negative for fatigue and fever. HENT: Negative for congestion and rhinorrhea. Respiratory: Positive for shortness of breath. Negative for cough and wheezing. Cardiovascular: Positive for leg swelling. Negative for chest pain and palpitations. Gastrointestinal: Positive for abdominal distention. Negative for abdominal pain, blood in stool, diarrhea, nausea and vomiting. Genitourinary: Negative for dysuria and flank pain. Musculoskeletal: Negative for back pain, myalgias, neck pain and stiffness. Skin: Negative for itching, rash and wound. Neurological: Negative for dizziness, tingling, weakness, light-headedness, numbness and headaches. Psychiatric/Behavioral: Negative for confusion. Vitals:  
 05/20/19 1655 BP: 129/75 Pulse: 95 Resp: 18 Temp: 98.4 °F (36.9 °C) SpO2: 96% Weight: 100.2 kg (221 lb) Height: 5' 8\" (1.727 m) Physical Exam  
Constitutional: He is oriented to person, place, and time. He appears well-developed and well-nourished. Well appearing and in NAD. HENT:  
Head: Normocephalic. MMM. Eyes: Pupils are equal, round, and reactive to light. Neck: No JVD present. No tracheal deviation present. Cardiovascular: Normal rate, regular rhythm and normal heart sounds. RRR. Pulses 2+ and equal bilaterally. Pulmonary/Chest: Effort normal and breath sounds normal.  
Diminished bilaterally. Abdominal: Soft. He exhibits distension. Ascites present. Distended. Soft. No rebound or guarding. No CVAT. Caput medusae present. Musculoskeletal: Normal range of motion. He exhibits edema. 3+ pitting edema extending up from feet to thigh (anasarca). DP pulses 2+ and equal bilaterally. No significant calf TTP. No overlying warmth or erythema. Neurological: He is alert and oriented to person, place, and time. No cranial nerve deficit. Strength 5/5 throughout. Normal sensory. Skin: Skin is warm and dry. Jaundiced. Nursing note and vitals reviewed. MDM Number of Diagnoses or Management Options Alcoholic cirrhosis of liver with ascites [facility]): new and requires workup Anasarca: new and requires workup Pleural effusion, left: new and requires workup Thrombocytopenia [facility]): new and requires workup Diagnosis management comments: New thrombocytopenia. States newer medications are Cipro, Mirtazapine, and Lexapro. GI consulted. States thrombocytopenia unexpected w/ cirrhosis. Recommends Heme consult. Agrees w/ plan for Lactulose for hyperammonemia and Lasix for edema given Cr stable at 0.98. Will consult Hospitalist for admission. Heme recommends Decadron 10 mg IV now followed by Prednisone 1 mg/kg daily. Amount and/or Complexity of Data Reviewed Clinical lab tests: ordered and reviewed Tests in the radiology section of CPT®: ordered and reviewed Tests in the medicine section of CPT®: reviewed and ordered Review and summarize past medical records: yes Discuss the patient with other providers: yes Independent visualization of images, tracings, or specimens: yes Risk of Complications, Morbidity, and/or Mortality Presenting problems: moderate Diagnostic procedures: moderate Management options: moderate Patient Progress Patient progress: stable ED Course as of May 20 1829 Mon May 20, 2019 1813 CXR IMPRESSION: Larger left pleural effusion. [DF] ED Course User Index 
[DF] Vinicius Chin MD  
 
 
EKG Date/Time: 5/20/2019 7:24 PM 
Performed by: Vinicius Chin MD 
Authorized by: Vinicius Cihn MD  
 
Rate:  
  ECG rate:  86 ECG rate assessment: normal   
Rhythm:  
  Rhythm: junctional   
Ectopy:  
  Ectopy: none QRS:  
  QRS axis:  Normal 
  QRS intervals:  Normal 
Conduction:  
  Conduction: normal   
ST segments: ST segments:  Normal 
T waves:  
  T waves: normal   
 
 
 
Results Include: 
 
Recent Results (from the past 24 hour(s)) BNP Collection Time: 05/20/19  4:58 PM  
Result Value Ref Range BNP 56 (H) 0 pg/mL METABOLIC PANEL, COMPREHENSIVE Collection Time: 05/20/19  4:58 PM  
Result Value Ref Range Sodium 131 (L) 136 - 145 mmol/L Potassium 4.2 3.5 - 5.1 mmol/L Chloride 97 (L) 98 - 107 mmol/L  
 CO2 25 21 - 32 mmol/L Anion gap 9 7 - 16 mmol/L Glucose 112 (H) 65 - 100 mg/dL  BUN 10 8 - 23 MG/DL  
 Creatinine 0.98 0.8 - 1.5 MG/DL  
 GFR est AA >60 >60 ml/min/1.73m2 GFR est non-AA >60 >60 ml/min/1.73m2 Calcium 8.1 (L) 8.3 - 10.4 MG/DL Bilirubin, total 1.5 (H) 0.2 - 1.1 MG/DL  
 ALT (SGPT) 26 12 - 65 U/L  
 AST (SGOT) 56 (H) 15 - 37 U/L Alk. phosphatase 275 (H) 50 - 136 U/L Protein, total 6.8 6.3 - 8.2 g/dL Albumin 2.4 (L) 3.2 - 4.6 g/dL Globulin 4.4 (H) 2.3 - 3.5 g/dL A-G Ratio 0.5 (L) 1.2 - 3.5 LIPASE Collection Time: 05/20/19  4:58 PM  
Result Value Ref Range Lipase 304 73 - 393 U/L MAGNESIUM Collection Time: 05/20/19  4:58 PM  
Result Value Ref Range Magnesium 1.9 1.8 - 2.4 mg/dL AMMONIA Collection Time: 05/20/19  5:03 PM  
Result Value Ref Range Ammonia 54 (H) 11 - 32 UMOL/L  
TROPONIN I Collection Time: 05/20/19  5:13 PM  
Result Value Ref Range Troponin-I, Qt. <0.02 (L) 0.02 - 0.05 NG/ML  
PTT Collection Time: 05/20/19  5:13 PM  
Result Value Ref Range aPTT 38.6 24.7 - 39.8 SEC PROTHROMBIN TIME + INR Collection Time: 05/20/19  5:13 PM  
Result Value Ref Range Prothrombin time 17.5 (H) 11.7 - 14.5 sec INR 1.5    
CBC WITH AUTOMATED DIFF Collection Time: 05/20/19  6:15 PM  
Result Value Ref Range WBC 5.5 4.3 - 11.1 K/uL  
 RBC 3.33 (L) 4.23 - 5.6 M/uL HGB 9.2 (L) 13.6 - 17.2 g/dL HCT 28.3 (L) 41.1 - 50.3 % MCV 85.0 79.6 - 97.8 FL  
 MCH 27.6 26.1 - 32.9 PG  
 MCHC 32.5 31.4 - 35.0 g/dL  
 RDW 16.1 (H) 11.9 - 14.6 % PLATELET 8 (LL) 091 - 450 K/uL MPV Unable to calculate. Recommend adding IPF. 9.4 - 12.3 FL ABSOLUTE NRBC 0.00 0.0 - 0.2 K/uL  
 DF AUTOMATED NEUTROPHILS 65 43 - 78 % LYMPHOCYTES 11 (L) 13 - 44 % MONOCYTES 19 (H) 4.0 - 12.0 % EOSINOPHILS 4 0.5 - 7.8 % BASOPHILS 1 0.0 - 2.0 % IMMATURE GRANULOCYTES 1 0.0 - 5.0 %  
 ABS. NEUTROPHILS 3.6 1.7 - 8.2 K/UL  
 ABS. LYMPHOCYTES 0.6 0.5 - 4.6 K/UL  
 ABS. MONOCYTES 1.0 0.1 - 1.3 K/UL  
 ABS. EOSINOPHILS 0.2 0.0 - 0.8 K/UL ABS. BASOPHILS 0.0 0.0 - 0.2 K/UL  
 ABS. IMM. GRANS. 0.0 0.0 - 0.5 K/UL Raj Monique MD; 5/20/2019 @4:58 PM Voice dictation software was used during the making of this note. This software is not perfect and grammatical and other typographical errors may be present.   This note has not been proofread for errors. 
===================================================================

## 2019-05-21 NOTE — H&P
HOSPITALIST H&P/CONSULTNAME:  Cyndi Dang Age:  69 y.o. 
:   1945 MRN:   [id number] PCP: Kera Townsend MD 
Consulting MD: Treatment Team: Attending Provider: Mark Hart DO 
HPI:  
Patient 57PU with hx of ETOH abuse (sober since 2018), [address] esophageal varcies, ileum ulcer, HTN, alcoholic cirrhosis with ascites and frequent paracentesis, chronic left pleural effusion, chronic cardiac murmur, presents with complaint of increasing abdominal and LE swelling. Reports his lasix \"isn't working anymore\". Pt typically sees [address] ([facility]) but has not been back since eval for liver transplant started at [facility]. He was diagnosed with [address] by biopsy in 2019 and follows with [facility]. His most recent paracentesis was  with 6.5 liters removed. ED eval notable for Na 131, Hgb 9.2, Plts 8 (down from 191 in April), INR 1.5, T bili 1.5, ammonia 54. CXR with large left pleural effusion. Patient denies any evidence of bleeding - no melena, hematochezia, hematuria. No new rashes. Complete ROS done and is as stated in HPI or otherwise negative Past Medical History:  
Diagnosis Date  Bronchitis  Cirrhosis of liver with ascites ([address]  Hypertension  Ulcer of ileum   
 ulcer Past Surgical History:  
Procedure Laterality Date  HX APPENDECTOMY  HX ORTHOPAEDIC    
 carpal tunnel  HX ORTHOPAEDIC    
 back surgery Prior to Admission Medications Prescriptions Last Dose Informant Patient Reported? Taking?  
budesonide-formoterol (SYMBICORT) 160-4.5 mcg/actuation HFAA 2019 at Unknown time  No Yes Sig: Take 2 Puffs by inhalation two (2) times a day. ciprofloxacin HCl (CIPRO) 500 mg tablet 2019 at Unknown time  Yes Yes Sig: Take  by mouth two (2) times a day. escitalopram oxalate (LEXAPRO) 10 mg tablet 2019 at Unknown time  No Yes Sig: Take 1 Tab by mouth daily. ferrous sulfate (IRON) 325 mg (65 mg iron) tablet 2019 at Unknown time  Yes Yes Sig: Take 27 mg by mouth Daily (before breakfast). furosemide (LASIX) 20 mg tablet 2019 at Unknown time  Yes Yes Sig: Take 20 mg by mouth daily. gabapentin (NEURONTIN) 400 mg capsule 2019 at Unknown time  No Yes Sig: Take 1 Cap by mouth three (3) times daily. Patient taking differently: Take 400 mg by mouth nightly. ibuprofen (MOTRIN) 200 mg tablet 2019 at Unknown time  Yes Yes Sig: Take  by mouth. Indications: Pain  
lactulose (GENERLAC) 10 gram/15 mL solution 2019 at Unknown time  Yes Yes Sig: Take  by mouth three (3) times daily. magnesium oxide (MAG-OX) 400 mg tablet 2019 at Unknown time  No Yes Sig: Take 1 Tab by mouth daily. Indications: low amount of magnesium in the blood  
mirtazapine (REMERON) 15 mg tablet 2019 at Unknown time  No Yes Sig: Take 1 Tab by mouth nightly. pantoprazole (PROTONIX) 40 mg tablet Unknown at Unknown time  Yes No  
Sig: Take 40 mg by mouth daily. spironolactone (ALDACTONE) 50 mg tablet 2019 at Unknown time  Yes Yes Sig: Take 50 mg by mouth daily. Facility-Administered Medications: None Allergies Allergen Reactions  Hydromet [Hydrocodone-Homatropine] Nausea Only Dry heaves Social History Tobacco Use  Smoking status: Never Smoker  Smokeless tobacco: Never Used Substance Use Topics  Alcohol use: No  
  Comment: None since 2018 Family History Problem Relation Age of Onset  COPD Mother  Heart Attack Father 52 Objective:  
 
Visit Vitals /81 Pulse 82 Temp 97.8 °F (36.6 °C) Resp 16 Ht 5' 8\" (1.727 m) Wt 100.2 kg (221 lb) SpO2 98% BMI 33.60 kg/m² Temp (24hrs), Av.1 °F (36.7 °C), Min:97.8 °F (36.6 °C), Max:98.4 °F (36.9 °C) Oxygen Therapy O2 Sat (%): 98 % (19) Pulse via Oximetry: 85 beats per minute (190) O2 Device: Room air (05/20/19 1949) Physical Exam: 
General:    Alert, cooperative, no distress, appears stated age. Head:   Normocephalic, without obvious abnormality, atraumatic. Nose:  Nares normal. No drainage or sinus tenderness. Lungs:   Diminished on the left. No Wheezing or Rhonchi. No rales. Heart:   Regular rate and rhythm,  no murmur, rub or gallop. Abdomen:   Soft but distended, bs pos Extremities: +3 pitting edema b/l LE's. Skin:     Texture, turgor normal. No rashes or lesions. Not Jaundiced Neurologic: Alert and oriented x 3, no focal deficits Data Review:  
Recent Results (from the past 24 hour(s)) BNP Collection Time: 05/20/19  4:58 PM  
Result Value Ref Range BNP 56 (H) 0 pg/mL METABOLIC PANEL, COMPREHENSIVE Collection Time: 05/20/19  4:58 PM  
Result Value Ref Range Sodium 131 (L) 136 - 145 mmol/L Potassium 4.2 3.5 - 5.1 mmol/L Chloride 97 (L) 98 - 107 mmol/L  
 CO2 25 21 - 32 mmol/L Anion gap 9 7 - 16 mmol/L Glucose 112 (H) 65 - 100 mg/dL BUN 10 8 - 23 MG/DL Creatinine 0.98 0.8 - 1.5 MG/DL  
 GFR est AA >60 >60 ml/min/1.73m2 GFR est non-AA >60 >60 ml/min/1.73m2 Calcium 8.1 (L) 8.3 - 10.4 MG/DL Bilirubin, total 1.5 (H) 0.2 - 1.1 MG/DL  
 ALT (SGPT) 26 12 - 65 U/L  
 AST (SGOT) 56 (H) 15 - 37 U/L Alk. phosphatase 275 (H) 50 - 136 U/L Protein, total 6.8 6.3 - 8.2 g/dL Albumin 2.4 (L) 3.2 - 4.6 g/dL Globulin 4.4 (H) 2.3 - 3.5 g/dL A-G Ratio 0.5 (L) 1.2 - 3.5 LIPASE Collection Time: 05/20/19  4:58 PM  
Result Value Ref Range Lipase 304 73 - 393 U/L MAGNESIUM Collection Time: 05/20/19  4:58 PM  
Result Value Ref Range Magnesium 1.9 1.8 - 2.4 mg/dL AMMONIA Collection Time: 05/20/19  5:03 PM  
Result Value Ref Range Ammonia 54 (H) 11 - 32 UMOL/L  
TROPONIN I Collection Time: 05/20/19  5:13 PM  
Result Value Ref Range Troponin-I, Qt. <0.02 (L) 0.02 - 0.05 NG/ML  
PTT Collection Time: 05/20/19  5:13 PM  
Result Value Ref Range aPTT 38.6 24.7 - 39.8 SEC PROTHROMBIN TIME + INR Collection Time: 05/20/19  5:13 PM  
Result Value Ref Range Prothrombin time 17.5 (H) 11.7 - 14.5 sec INR 1.5 EKG, 12 LEAD, INITIAL Collection Time: 05/20/19  5:29 PM  
Result Value Ref Range Ventricular Rate 86 BPM  
 Atrial Rate 93 BPM  
 QRS Duration 62 ms Q-T Interval 336 ms  
 QTC Calculation (Bezet) 402 ms Calculated R Axis 26 degrees Calculated T Axis -104 degrees Diagnosis    
  !! AGE AND GENDER SPECIFIC ECG ANALYSIS !! Accelerated Junctional rhythm Low voltage QRS Septal infarct , age undetermined Abnormal ECG When compared with ECG of 28-DEC-2018 13:05, Significant changes have occurred CBC WITH AUTOMATED DIFF Collection Time: 05/20/19  6:15 PM  
Result Value Ref Range WBC 5.5 4.3 - 11.1 K/uL  
 RBC 3.33 (L) 4.23 - 5.6 M/uL HGB 9.2 (L) 13.6 - 17.2 g/dL HCT 28.3 (L) 41.1 - 50.3 % MCV 85.0 79.6 - 97.8 FL  
 MCH 27.6 26.1 - 32.9 PG  
 MCHC 32.5 31.4 - 35.0 g/dL  
 RDW 16.1 (H) 11.9 - 14.6 % PLATELET 8 (LL) 715 - 450 K/uL MPV Unable to calculate. Recommend adding IPF. 9.4 - 12.3 FL ABSOLUTE NRBC 0.00 0.0 - 0.2 K/uL  
 DF AUTOMATED NEUTROPHILS 65 43 - 78 % LYMPHOCYTES 11 (L) 13 - 44 % MONOCYTES 19 (H) 4.0 - 12.0 % EOSINOPHILS 4 0.5 - 7.8 % BASOPHILS 1 0.0 - 2.0 % IMMATURE GRANULOCYTES 1 0.0 - 5.0 %  
 ABS. NEUTROPHILS 3.6 1.7 - 8.2 K/UL  
 ABS. LYMPHOCYTES 0.6 0.5 - 4.6 K/UL  
 ABS. MONOCYTES 1.0 0.1 - 1.3 K/UL  
 ABS. EOSINOPHILS 0.2 0.0 - 0.8 K/UL  
 ABS. BASOPHILS 0.0 0.0 - 0.2 K/UL  
 ABS. IMM. GRANS. 0.0 0.0 - 0.5 K/UL PLATELETS, ALLOCATE Collection Time: 05/20/19  8:00 PM  
Result Value Ref Range Unit number [id number] Blood component type PLTPH, LR Unit division 00 Status of unit ALLOCATED Unit number [id number] Blood component type PLTPH, LR Unit division 00 Status of unit ALLOCATED   
TYPE & SCREEN Collection Time: 05/20/19  8:18 PM  
Result Value Ref Range Crossmatch Expiration 05/23/2019 ABO/Rh(D) B POSITIVE Antibody screen NEG Imaging [facility] Portable chest x-ray 
  
CLINICAL INDICATION: Swelling, recent paracentesis 
  
FINDINGS: Single AP view the chest compared to a similar exam dated 3/26/2019 
shows a larger left pleural effusion. Atelectatic change suspect the left lung 
base. The right lung is clear. The cardiac silhouette and mediastinum are 
stable. 
  
IMPRESSION IMPRESSION: Larger left pleural effusion Assessment and Plan: Active Hospital Problems Diagnosis Date Noted  Decompensated hepatic cirrhosis ([facility] [address] 05/20/2019  Hyperkalemia 03/26/2019  Pleural effusion, left 01/16/2019  Hyponatremia 12/28/2018  Essential hypertension 10/30/2018  Alcoholic cirrhosis of liver with ascites ([facility] [address] 10/08/2018  Esophageal varices without bleeding ([facility] [address] 10/08/2018 A/p 
- Decompensated alcoholic cirrhosis - increase lasix to 40mg IV daily, increase aldactone to 100mg. . Consult GI.  
 
- Ascites - plan for paracentesis by IR when plts stable. SBP prophy with Cipro. - Hyponatremia - monitor with diuresis. - Thrombocytopenia - per heme recs start high dose prednisone, added peripheral smear. Ordered 2 units plts. Consult hematology. Avoid all anticoagulation - Hyperammonemia - asymptomatic. Continue scheduled lactulose. Code Status: full code Anticipated discharge:  
 
Signed By: Meghna Sanchez, DO May 20, 2019

## 2019-05-21 NOTE — PROGRESS NOTES
Progress Note Patient: Naida Bustillo MRN: [id number]  SSN: [id number] YOB: 1945  Age: 69 y.o. Sex: male Admit Date: 5/20/2019 LOS: 1 day Subjective: This is a 68 YO male patient with a PMH of cirrhosis and HCC being evaluated for liver transplant at [facility] admitted for severe ascites with SOB and leg edema and found to have a Plt count of 8K. He received 1 U of platelets yesterday. Today his CBC reported a Plt count of 225K. I think the Plt count  of 8K may have been a lab error. Very swollen. Will need to continue IV diuretics and paracentesis. D-Dimer high. Oncology on board. Has active malignancy. Objective:  
 
Vitals:  
 05/21/19 0802 05/21/19 0900 05/21/19 1230 05/21/19 1406 BP: 117/71  110/78 Pulse: 87  (!) 102 Resp: 18  18 Temp: 97.6 °F (36.4 °C)  97.4 °F (36.3 °C) SpO2: 96% 96% 99% 96% Weight:      
Height:      
  
 
Intake and Output: 
Current Shift: No intake/output data recorded. Last three shifts: 05/19 1901 - 05/21 0700 In: 586.7 Out: 1200 [Urine:1200] Physical Exam:  
GENERAL: alert, appears stated age EYE: conjunctivae/corneas clear. LYMPHATIC: Cervical, supraclavicular, and axillary nodes normal.  
THROAT & NECK: normal and no erythema or exudates noted. LUNG: clear to auscultation bilaterally HEART: regular rate and rhythm, S1, S2 normal, no murmur, click, rub or gallop ABDOMEN: distended. + for ascites EXTREMITIES: bilateral leg edema SKIN: Normal. 
NEUROLOGIC: No focal deficits PSYCHIATRIC: non focal 
 
Lab/Data Review: All lab results for the last 24 hours reviewed. Assessment:  
 
Active Problems: 
  Alcoholic cirrhosis of liver with ascites ([address] (10/8/2018) Esophageal varices without bleeding ([address] (10/8/2018) Essential hypertension (10/30/2018) Hyponatremia (12/28/2018) Pleural effusion, left (1/16/2019) Hyperkalemia (3/26/2019) Decompensated hepatic cirrhosis ([facility] [address] (5/20/2019) Plan:  
 
- severe ascites with leg edema  
-continue diuretics  
-vein US of the lower extremities was negative for DVT  
-Plt count up to 225K ( ?). Will repeat a CBC today.  
-if he really has thrombocytopenia will order a CT scan of the chest and abdomen to rule out DVT  
-GI on board Signed By: Higinio Craig MD   
 May 21, 2019

## 2019-05-21 NOTE — CONSULTS
[facility] Consult Note Primary GI Physician: Dr Luz Maria Padgett Referring Provider:  Dr Carlos Cobos Consult Date:  5/21/2019 Admit Date:  5/20/2019 Chief Complaint:  Decompensated Cirrhosis Subjective:  
 
History of Present Illness:  Patient is a 69 y.o. male with PMH of  ETOH abuse (sober since July 2018), [address] (dx 4/2019 no txt), esophageal varcies, ileum ulcer, HTN, alcoholic cirrhosis, with ascites and frequent paracentesis, chronic left pleural effusion, chronic cardiac murmur and HE who is seen in consultation at the request of Dr. Dinorah Suh for cirrhosis. Patient has been seen by [facility]. He was diagnosed with [address] by biopsy in April 2019 and follows with [facility]. His most recent paracentesis was 5/13 with 6.5 liters removed. Last hospitalized 3/26-4/1 for decompensated cirrhosis with HE and from there was transferred to [facility] where he was hospitalized for 1 week. He was seen at [facility] this past Wednesday by Hepatology (Dr. Jagdish Brewster) in regards to liver transplant. States that Dr. Jagdish Brewster informed him that he would likely need to be admitted upon returning home due to worsening ascites and LE edema. He has a liver transplant eval planned for mid June. Last Para 5/13 with 6500 cc removed, also 5/2, 4/19. Hist last EGD was in [place] in 6/2018 showing nonbleeding esophageal varices and PUD. His last colo was at the [place] in [place] about 8-9 years ago and normal per patient with 10 year follow up recommended. He presented this admission with gross lower extremity edema and ascites. Was on Lasix 20 and Aldactone 50 at home. Na slightly low today at 133. LFT's are elevated but stable. Plt count is very low at 8k.  hgb stable at 9.2. Pt reports taking lactulose and xifaxin at home w 5 BM 2 days ago and 2 yesterday. Mentally clear today w no signs of asterixis presently. He denies BRRB or tarry stools, SOB or abd pain. No fever or chills. PMH: 
Past Medical History:  
Diagnosis Date  Bronchitis  Cirrhosis of liver with ascites ([address]  Hypertension  Ulcer of ileum   
 ulcer PSH: 
Past Surgical History:  
Procedure Laterality Date  HX APPENDECTOMY  HX ORTHOPAEDIC    
 carpal tunnel  HX ORTHOPAEDIC    
 back surgery Allergies: Allergies Allergen Reactions  Hydromet [Hydrocodone-Homatropine] Nausea Only Dry heaves Home Medications: 
Prior to Admission medications Medication Sig Start Date End Date Taking? Authorizing Provider  
ibuprofen (MOTRIN) 200 mg tablet Take  by mouth. Indications: Pain   Yes Provider, Historical  
gabapentin (NEURONTIN) 400 mg capsule Take 1 Cap by mouth three (3) times daily. Patient taking differently: Take 400 mg by mouth nightly. 5/13/19  Yes Nathanael Barth MD  
escitalopram oxalate (LEXAPRO) 10 mg tablet Take 1 Tab by mouth daily. 5/13/19  Yes Nathanael Barth MD  
spironolactone (ALDACTONE) 50 mg tablet Take 50 mg by mouth daily. Yes Provider, Historical  
furosemide (LASIX) 20 mg tablet Take 20 mg by mouth daily. Yes Provider, Historical  
budesonide-formoterol (SYMBICORT) 160-4.5 mcg/actuation HFAA Take 2 Puffs by inhalation two (2) times a day. 4/26/19  Yes Nathanael Barth MD  
magnesium oxide (MAG-OX) 400 mg tablet Take 1 Tab by mouth daily. Indications: low amount of magnesium in the blood 4/23/19  Yes Nathanael Barth MD  
ciprofloxacin HCl (CIPRO) 500 mg tablet Take  by mouth two (2) times a day. Yes Provider, Historical  
lactulose (GENERLAC) 10 gram/15 mL solution Take  by mouth three (3) times daily. Yes Provider, Historical  
mirtazapine (REMERON) 15 mg tablet Take 1 Tab by mouth nightly. 4/11/19  Yes Nathanael Barth MD  
ferrous sulfate (IRON) 325 mg (65 mg iron) tablet Take 27 mg by mouth Daily (before breakfast). Yes Other, MD Sophia  
pantoprazole (PROTONIX) 40 mg tablet Take 40 mg by mouth daily.     Provider, Historical  
 
 
 Hospital Medications: 
Current Facility-Administered Medications Medication Dose Route Frequency  0.9% sodium chloride infusion 250 mL  250 mL IntraVENous PRN  
 escitalopram oxalate (LEXAPRO) tablet 10 mg  10 mg Oral DAILY  ferrous sulfate 300 mg (60 mg iron)/5 mL oral syrup 27 mg  27 mg Oral ACB  furosemide (LASIX) injection 40 mg  40 mg IntraVENous DAILY  gabapentin (NEURONTIN) capsule 400 mg  400 mg Oral TID  lactulose (CHRONULAC) 10 gram/15 mL solution 15 mL  10 g Oral TID  magnesium oxide (MAG-OX) tablet 400 mg  400 mg Oral DAILY  mirtazapine (REMERON) tablet 15 mg  15 mg Oral QHS  pantoprazole (PROTONIX) tablet 40 mg  40 mg Oral DAILY  budesonide (PULMICORT) 500 mcg/2 ml nebulizer suspension  500 mcg Nebulization BID RT And  
 albuterol (PROVENTIL VENTOLIN) nebulizer solution 2.5 mg  2.5 mg Nebulization Q6HWA RT  
 sodium chloride (NS) flush 5-40 mL  5-40 mL IntraVENous Q8H  
 sodium chloride (NS) flush 5-40 mL  5-40 mL IntraVENous PRN  
 acetaminophen (TYLENOL) tablet 650 mg  650 mg Oral Q4H PRN  
 ondansetron (ZOFRAN) injection 4 mg  4 mg IntraVENous Q4H PRN  
 ciprofloxacin HCl (CIPRO) tablet 500 mg  500 mg Oral Q24H  
 spironolactone (ALDACTONE) tablet 100 mg  100 mg Oral DAILY Social History: 
Social History Tobacco Use  Smoking status: Never Smoker  Smokeless tobacco: Never Used Substance Use Topics  Alcohol use: No  
  Comment: None since 7/2018 Pt denies any history of drug use, blood transfusions, or tattoos. Family History: 
Family History Problem Relation Age of Onset  COPD Mother  Heart Attack Father 52 Review of Systems: A detailed 10 system ROS is obtained, with pertinent positives as listed above. All others are negative. Diet:  Regular Objective:  
 
Physical Exam: 
Vitals: 
Visit Vitals /82 Pulse 91 Temp 97.5 °F (36.4 °C) Resp 19 Ht 5' 8\" (1.727 m) Wt 100.2 kg (221 lb) SpO2 94% BMI 33.60 kg/m² Gen:  Pt is alert, cooperative, no acute distress Skin:  Extremities and face reveal no rashes. HEENT: Sclerae anicteric. Extra-occular muscles are intact. No oral ulcers. No abnormal pigmentation of the lips. The neck is supple. Cardiovascular: Regular rate and rhythm. No murmurs, gallops, or rubs. Respiratory:  Comfortable breathing with no accessory muscle use. Clear breath sounds anteriorly with no wheezes, rales, or rhonchi. GI:  Abdomen nondistended, soft, and nontender. Normal active bowel sounds. No enlargement of the liver or spleen. No masses palpable. Rectal:  Deferred Musculoskeletal:  No pitting edema of the lower legs. Neurological:  Gross memory appears intact. Patient is alert and oriented. Psychiatric:  Mood appears appropriate with judgement intact. Lymphatic:  No cervical or supraclavicular adenopathy. Laboratory:   
Recent Labs  
  05/20/19 
1815 05/20/19 
1713 05/20/19 
1658 WBC 5.5  --   --   
HGB 9.2*  --   --   
HCT 28.3*  --   --   
PLT 8*  --   -- MCV 85.0  --   --   
NA  --   --  131* K  --   --  4.2 CL  --   --  97* CO2  --   --  25 BUN  --   --  10  
CREA  --   --  0.98  
CA  --   --  8.1*  
MG  --   --  1.9 GLU  --   --  112* AP  --   --  275* SGOT  --   --  56* ALT  --   --  26  
TBILI  --   --  1.5* ALB  --   --  2.4* TP  --   --  6.8 LPSE  --   --  304 PTP  --  17.5*  --   
INR  --  1.5  --   
APTT  --  38.6  --   
  
  Ref. Range 3/27/2019 09:52 Lactic acid Latest Ref Range: 0.4 - 2.0 MMOL/L 1.4 Procalcitonin Latest Units: ng/mL 1.9 Ammonia Latest Ref Range: 11 - 32 UMOL/L 89 (H)  
  
  Ref. Range 3/25/2019 11:08 BODY FLUID TYPE Latest Units:   PARACENTESIS FLUID FLUID APPEARANCE Latest Units:   HAZY FLUID COLOR Latest Units:   YELLOW  
FLUID RBC CT. Latest Units: /cu mm <1,000 FLUID WBC COUNT Latest Units: /cu mm 128 FLD NEUTROPHILS Latest Units: % 4 FLD LYMPHS Latest Units: % 95 FLD MONO/MACROPHAGES Latest Units: % 1  
  
CULTURE, BODY FLUID [place] STAIN [[id number]] (Order [id number]) Microbiology Date: 3/25/2019 Department: [facility] Released By:  (auto-released) Authorizing: ROCIO Maier Specimen Information: Paracentesis Fluid  
     
Component Value Flag Ref Range Units Status Special Requests:         Final  
NO SPECIAL REQUESTS   
GRAM STAIN 0 TO 1 WBC/OIF        Final  
GRAM STAIN         Final  
FEW GRAM POSITIVE COCCI Culture result: Abnormal          Final  
MODERATE STREPTOCOCCUS SALIVARIUS Susceptibility Streptococcus salivarius Antibiotic Interpretation Value Method Comment Penicillin G ($$) Intermediate 0.5 ug/mL VENTURA    
Clindamycin ($) Susceptible 0.0625 ug/mL VENTURA    
Vancomycin ($) Susceptible 0.5 ug/mL VENTURA    
Cefepime ($$) Susceptible 0.25 ug/mL VENTURA    
Ceftriaxone ($) Susceptible 0.125 ug/mL VENTURA    
Amoxicillin Intermediate 0.5 ug/mL VENTURA    
  
Ultrasound guided paracentesis. 25 March 2019 History: Cirrhosis, ascites.  
: Luciana Spivey PA-C  
Supervising Physician: Fernie Kearney M.D.  
Consent:  Informed written and oral consent was obtained from the patient after 
the risks and benefits were discussed.  All questions were answered and the 
patient requested that we proceed.  
Procedure:  Maximal sterile barrier technique was used.  Following routine prep 
and drape of the abdomen, a local field block with 1% lidocaine was achieved. Ultrasound evaluation was performed.   
Fluid was identified in the peritoneal cavity. Using real-time ultrasound 
guidance, the peritoneal cavity was accessed with an 8 Albanian centesis catheter. 
 The catheter was connected to Vacutainer bottles.  
A total of 6400 cc of thin yellow fluid was removed and sent to the lab. The 
catheter was removed and a bandage applied.  
Complications: None.  Findings: Large-volume ascites.  IMPRESSION 
 Impression: Uncomplicated ultrasound guided paracentesis. 
  
CT of the abdomen and pelvis without contrast. 26 March 2019 ADDITIONAL CLINICAL INFORMATION: Cirrhosis with paracentesis yesterday now with 
abdominal distention with nausea and vomiting. Comparison: Abdominal ultrasound dated March 25, 2019.  
Multiple axial images were obtained through the abdomen and pelvis without IV 
contrast.  Radiation dose reduction techniques were used for this study:  All CT 
scans performed at this facility use one or all of the following: Automated 
exposure control, adjustment of the mA and/or kVp according to patient's size, 
iterative reconstruction.  
FINDINGS: 
LOWER THORAX: Moderately sized left-sided pleural effusion with atelectatic 
change of the left lung base. Extensive coronary artery calcifications.  LIVER: Atrophic liver with prominent macronodular contour consistent with 
cirrhosis, slight caudate lobe hypertrophy. Cannot evaluate for hepatic lesions 
with the lack of IV contrast.  
BILIARY SYSTEM: The gallbladder is contracted with slight wall thickening and 
with evidence of cholelithiasis.  
SPLEEN: Borderline splenomegaly.  
PANCREAS: No pancreatic mass. No pancreatic duct dilation.  ADRENALS: No adrenal nodule or adrenal hypertrophy.  
URINARY SYSTEM: Left lower pole exophytic renal cyst measuring 3 cm. Left 
superior pole exophytic 1.9 cm intermediate density lesion which is incompletely 
characterized.  
FLUID: Obed Clements is a large volume of ascites within the abdomen and pelvis.  
REPRODUCTIVE ORGANS: Unremarkable.  
BOWEL: There is diffuse wall thickening of the ascending and transverse colon 
which may be related to patient's cirrhosis. No evidence of bowel structure. There is diffuse wall thickening of the small bowel.  
VASCULAR/NODES: No aortic or iliac artery aneurysm. No lymphadenopathy.  
Perigastric and paraesophageal varices are noted.  
 BONES/SUBCUTANEOUS TISSUE: Mild bilateral gynecomastia.  IMPRESSION IMPRESSION: 
1. Macronodular cirrhosis with sequela of portal hypertension with mild 
splenomegaly and with perigastric and periesophageal varices evident. Large 
volume of intra-abdominal ascites. 2. Moderately sized left-sided pleural effusion. 3. 1.9 cm intermediate density lesion of the left kidney which is exactly 
characterize recommend follow-up with MRI or renal mass CT. 4. Diffuse wall thickening of the ascending and transverse colon and mild wall 
thickening of the small bowel favored related to fluid status and cirrhosis. Ultrasound guided paracentesis 13 May 2019 Procedure:  Maximal sterile barrier technique was used. Following routine prep 
and drape of the abdomen, a local field block with 1% lidocaine was achieved. Ultrasound evaluation was performed. Fluid was identified in the peritoneal cavity. Using real-time ultrasound 
guidance, the peritoneal cavity was accessed with an 8 Hebrew centesis catheter. The catheter was connected to Vacutainer bottles. A total of 6500 cc of thin yellow fluid was removed. The catheter was removed 
and a bandage applied. Complications: None. Findings: Large-volume ascites. IMPRESSION Impression: Uncomplicated ultrasound guided paracentesis. 
  
 
 
Assessment:  
 
Active Problems: 
  Alcoholic cirrhosis of liver with ascites ([address] (10/8/2018) Esophageal varices without bleeding ([address] (10/8/2018) Essential hypertension (10/30/2018) Hyponatremia (12/28/2018) Pleural effusion, left (1/16/2019) Hyperkalemia (3/26/2019)   Decompensated hepatic cirrhosis ([address] (5/20/2019) 
 
 
  
ETOH abuse (sober since July 2018), [address] (dx 4/2019 no txt), esophageal varcies, ileum ulcer, HTN, alcoholic cirrhosis, with ascites and frequent paracentesis, chronic left pleural effusion, chronic cardiac murmur and HE admitted w worsening edema and ascites. Vasile Jack last week and has liver trans eval mid June. Dx w [address] 4/2019 and under no txt currently. Na low at 133. Plt very low at 8k. Cr ok at 1.12. TIPs discussed in the past but HE during last admission. Plan: - MELD score of 18 for 5/20 
- Continue Lasix and Aldactone agree w increasing to 40/100 
- Continue Lactulose (has been on Xifaxin but no signs of current HE). Titrate lactulose for 3 BMs per day - On Cipro for SBP protection - On regular diet will add Na restriction to 2 grams per day. - Heme consult pending for thrombocytopenia Jameson Reynolds NP Patient is seen and examined in collaboration with Dr. Olga Sanchez. Assessment and plan as per Dr. Samira Comer.

## 2019-05-21 NOTE — PROGRESS NOTES
Spoke to Ms. Josias Lott and her  in room 214 about Case Management and discharge planning. He is somewhat independent with ADLs at home, but needs assistance with some tasks, such as getting into the shower (has to step over the tube lip). Mr. Josias Lott is current with [facility] ([phone]) PT and RN. Left message for Fina home health RN at cell [phone]. Will follow and assist with discharge planning, which will likely be resumption of care [facility] [place]) for home health RN and PT. Care Management Interventions PCP Verified by CM: Yes Transition of Care Consult (CM Consult): Home Health [address]: No 
Reason Outside [place]: Patient already serviced by other home care/hospice agency Current Support Network: Lives with Spouse, Own Home Confirm Follow Up Transport: Family Plan discussed with Pt/Family/Caregiver:  Yes

## 2019-05-21 NOTE — PROGRESS NOTES
Attempted to see patient for consult. Patient in [address]. We will see patient tomorrow. Chart reviewed and patient placed on prednisone 40 mg bid.

## 2019-05-21 NOTE — CONSULTS
[facility] Inpatient Hematology / Oncology Consult NoteReason for Consult:  Decompensated hepatic cirrhosis ([address] [K72.90] Referring Physician:  Liyah Link DO History of Present Illness: Mr. Errol Rubi is a 69 y.o. male admitted on 5/20/2019 with a primary diagnosis of The primary encounter diagnosis was Thrombocytopenia ([address] Diagnoses of Pleural effusion, left, Alcoholic cirrhosis of liver with ascites ([address] Anasarca, and Hyperammonemia ([address] were also pertinent to this visit. Daisy Rubi is a  68 yo male with pmh  of ETOH abuse (sober since July 2018), [address] diagnosed 4/2019,  esophageal varcies, ileum ulcer, HTN, alcoholic cirrhosis with ascites and frequent paracentesis, chronic left pleural effusion, chronic cardiac murmur. He  Presented to ER on 5/20/2019 with complaint of increasing abdominal and BLE swelling. ER eval notable for Na 131, Hgb 9.2, Plts 8 (down from 191 in April), INR 1.5, T bili 1.5, ammonia 54. CXR with large left pleural effusion. Mr. Errol Rubi is a known patient of Dr. John Barahona with recent diagnosis [address] early stage also with end stage cirrhosis. He was recently referred to [facility] for workup for liver transplant. Plans was for possible Y90 if transplant could not happen in a timely manner. We are consulted for thrombocytopenia. Review of Systems: 
Constitutional Denies fever, chills, weight loss, appetite changes HEENT Denies trauma, blurry vision, hearing loss, ear pain, nosebleeds, sore throat, neck pain Skin Denies lesions or rashes. Lungs Denies dyspnea, cough, sputum production Cardiovascular Denies chest pain, palpitations, BLE edema Gastrointestinal Denies nausea, vomiting, +ascites Neuro Denies headaches, visual changes or ataxia. Daisy Ho MSK Denies back pain, arthralgias, myalgias or frequent falls. Psychiatric/Behavioral The patient is not nervous/anxious. Allergies Allergen Reactions  Hydromet [Hydrocodone-Homatropine] Nausea Only Dry heaves Past Medical History:  
Diagnosis Date  Bronchitis  Cirrhosis of liver with ascites ([facility]  Hypertension  Ulcer of ileum   
 ulcer Past Surgical History:  
Procedure Laterality Date  HX APPENDECTOMY  HX ORTHOPAEDIC    
 carpal tunnel  HX ORTHOPAEDIC    
 back surgery Family History Problem Relation Age of Onset  COPD Mother  Heart Attack Father 52 Social History Socioeconomic History  Marital status:  Spouse name: Not on file  Number of children: Not on file  Years of education: Not on file  Highest education level: Not on file Occupational History  Not on file Social Needs  Financial resource strain: Not on file  Food insecurity:  
  Worry: Not on file Inability: Not on file  Transportation needs:  
  Medical: Not on file Non-medical: Not on file Tobacco Use  Smoking status: Never Smoker  Smokeless tobacco: Never Used Substance and Sexual Activity  Alcohol use: No  
  Comment: None since 7/2018  Drug use: No  
 Sexual activity: Not on file Lifestyle  Physical activity:  
  Days per week: Not on file Minutes per session: Not on file  Stress: Not on file Relationships  Social connections:  
  Talks on phone: Not on file Gets together: Not on file Attends Orthodoxy service: Not on file Active member of club or organization: Not on file Attends meetings of clubs or organizations: Not on file Relationship status: Not on file  Intimate partner violence:  
  Fear of current or ex partner: Not on file Emotionally abused: Not on file Physically abused: Not on file Forced sexual activity: Not on file Other Topics Concern  Not on file Social History Narrative  and lives with wife. Has lived in [place] and [place]. Has one dog. Retired. Previously worked as . Current Facility-Administered Medications Medication Dose Route Frequency Provider Last Rate Last Dose  albumin human 25% (BUMINATE) solution 12.5 g  12.5 g IntraVENous BID Gustavo Watson MD   12.5 g at 05/23/19 0849  
 pantoprazole (PROTONIX) tablet 40 mg  40 mg Oral ACB Carol Pickens NP   40 mg at 05/23/19 0646  
 0.9% sodium chloride infusion 250 mL  250 mL IntraVENous PRN Marthamillie Beltran C, DO      
 escitalopram oxalate (LEXAPRO) tablet 10 mg  10 mg Oral DAILY Maria Luisa Garvey, DO   10 mg at 05/23/19 7404  ferrous sulfate 300 mg (60 mg iron)/5 mL oral syrup 27 mg  27 mg Oral ACB Maria Luisa Garvey, DO   27 mg at 05/23/19 5835  furosemide (LASIX) injection 40 mg  40 mg IntraVENous DAILY Maria Luisa Garvey, DO   40 mg at 05/23/19 0849  
 gabapentin (NEURONTIN) capsule 400 mg  400 mg Oral TID [facility], DO   400 mg at 05/23/19 0849  
 lactulose (CHRONULAC) 10 gram/15 mL solution 15 mL  10 g Oral TID [facility], DO   15 mL at 05/23/19 0849  
 magnesium oxide (MAG-OX) tablet 400 mg  400 mg Oral DAILY [facility], DO   400 mg at 05/23/19 0849  
 mirtazapine (REMERON) tablet 15 mg  15 mg Oral QHS [facility], DO   15 mg at 05/22/19 2158  budesonide (PULMICORT) 500 mcg/2 ml nebulizer suspension  500 mcg Nebulization BID RT Richardson Garvey, DO   500 mcg at 05/23/19 0730  And  
 albuterol (PROVENTIL VENTOLIN) nebulizer solution 2.5 mg  2.5 mg Nebulization Q6HWA RT Maria Luisa Garvey, DO   2.5 mg at 05/23/19 0730  
 sodium chloride (NS) flush 5-40 mL  5-40 mL IntraVENous Q8H Maria Luisa Garvey, DO   10 mL at 05/23/19 0600  
 sodium chloride (NS) flush 5-40 mL  5-40 mL IntraVENous PRN Marthamillie SMITH, DO      
 acetaminophen (TYLENOL) tablet 650 mg  650 mg Oral Q4H PRN Maria Luisa Garvey, DO      
 ondansetron [facility]) injection 4 mg  4 mg IntraVENous Q4H PRN Martha SMITH, DO      
 ciprofloxacin HCl (CIPRO) tablet 500 mg  500 mg Oral Q24H Guru Pauline Mason, DO   500 mg at 19 2158  spironolactone (ALDACTONE) tablet 100 mg  100 mg Oral DAILY Maria Luisa Garvey DO   100 mg at 19 8294 OBJECTIVE: 
Patient Vitals for the past 8 hrs: 
 BP Temp Pulse Resp SpO2  
19 0731     94 % 19 0654 131/79 98.4 °F (36.9 °C) 100 18 95 % Temp (24hrs), Av.9 °F (36.6 °C), Min:97.4 °F (36.3 °C), Max:98.4 °F (36.9 °C) 
 
 0701 -  1900 In: -  
Out: 400 [Urine:400] Physical Exam: 
Constitutional: Well developed, well nourished male in no acute distress, sitting comfortably in the hospital bed. HEENT: Normocephalic and atraumatic. Oropharynx is clear, mucous membranes are moist. Extraocular muscles are intact. Sclerae anicteric. Skin Warm and dry. No bruising and no rash noted. No erythema. No pallor. Respiratory Lungs are clear to auscultation bilaterally without wheezes, rales or rhonchi, normal air exchange without accessory muscle use. CVS Normal rate, regular rhythm and normal S1 and S2. No murmurs, gallops, or rubs. Abdomen Distended. + ascites Neuro Grossly nonfocal with no obvious sensory or motor deficits. MSK Normal range of motion in general. BLE improving Psych Appropriate mood and affect. Labs:   
Recent Results (from the past 24 hour(s)) CELL COUNT, BODY FLUID Collection Time: 19 10:58 AM  
Result Value Ref Range BODY FLUID TYPE ASCITIC FLUID FLUID COLOR YELLOW    
 FLUID APPEARANCE HAZY FLUID RBC CT. 1,000 /cu mm FLUID WBC COUNT 83 /cu mm  
 FLD NEUTROPHILS 4 % FLD LYMPHS 95 % FLD MONO/MACROPHAGES 1 % CULTURE, BODY FLUID W GRAM STAIN Collection Time: 19 10:58 AM  
Result Value Ref Range Special Requests: NO SPECIAL REQUESTS    
 GRAM STAIN 0 TO 4 WBCS/OIF   
 GRAM STAIN NO DEFINITE ORGANISM SEEN Culture result:     
  NO GROWTH AFTER SHORT PERIOD OF INCUBATION. FURTHER RESULTS TO FOLLOW AFTER OVERNIGHT INCUBATION. CBC WITH AUTOMATED DIFF Collection Time: 05/23/19  5:03 AM  
Result Value Ref Range WBC 9.4 4.3 - 11.1 K/uL  
 RBC 2.83 (L) 4.23 - 5.6 M/uL HGB 7.8 (L) 13.6 - 17.2 g/dL HCT 24.4 (L) 41.1 - 50.3 % MCV 86.2 79.6 - 97.8 FL  
 MCH 27.6 26.1 - 32.9 PG  
 MCHC 32.0 31.4 - 35.0 g/dL  
 RDW 16.3 (H) 11.9 - 14.6 % PLATELET 299 659 - 237 K/uL MPV 10.8 9.4 - 12.3 FL ABSOLUTE NRBC 0.00 0.0 - 0.2 K/uL  
 DF AUTOMATED NEUTROPHILS 76 43 - 78 % LYMPHOCYTES 6 (L) 13 - 44 % MONOCYTES 17 (H) 4.0 - 12.0 % EOSINOPHILS 0 (L) 0.5 - 7.8 % BASOPHILS 0 0.0 - 2.0 % IMMATURE GRANULOCYTES 1 0.0 - 5.0 %  
 ABS. NEUTROPHILS 7.1 1.7 - 8.2 K/UL  
 ABS. LYMPHOCYTES 0.6 0.5 - 4.6 K/UL  
 ABS. MONOCYTES 1.6 (H) 0.1 - 1.3 K/UL  
 ABS. EOSINOPHILS 0.0 0.0 - 0.8 K/UL  
 ABS. BASOPHILS 0.0 0.0 - 0.2 K/UL  
 ABS. IMM. GRANS. 0.1 0.0 - 0.5 K/UL METABOLIC PANEL, COMPREHENSIVE Collection Time: 05/23/19  5:03 AM  
Result Value Ref Range Sodium 137 136 - 145 mmol/L Potassium 3.9 3.5 - 5.1 mmol/L Chloride 102 98 - 107 mmol/L  
 CO2 28 21 - 32 mmol/L Anion gap 7 7 - 16 mmol/L Glucose 162 (H) 65 - 100 mg/dL BUN 14 8 - 23 MG/DL Creatinine 0.99 0.8 - 1.5 MG/DL  
 GFR est AA >60 >60 ml/min/1.73m2 GFR est non-AA >60 >60 ml/min/1.73m2 Calcium 8.2 (L) 8.3 - 10.4 MG/DL Bilirubin, total 0.9 0.2 - 1.1 MG/DL  
 ALT (SGPT) 22 12 - 65 U/L  
 AST (SGOT) 49 (H) 15 - 37 U/L Alk. phosphatase 197 (H) 50 - 136 U/L Protein, total 6.1 (L) 6.3 - 8.2 g/dL Albumin 2.5 (L) 3.2 - 4.6 g/dL Globulin 3.6 (H) 2.3 - 3.5 g/dL A-G Ratio 0.7 (L) 1.2 - 3.5 MAGNESIUM Collection Time: 05/23/19  5:03 AM  
Result Value Ref Range Magnesium 2.2 1.8 - 2.4 mg/dL ASSESSMENT: 
Problem List  Date Reviewed: 4/23/2019 Codes Class Noted Decompensated hepatic cirrhosis (HCC) ICD-10-CM: K72.90 ICD-9-CM: 572.8  5/20/2019 Leukocytosis ICD-10-CM: M42.495 ICD-9-CM: 288.60  3/26/2019 SBP (spontaneous bacterial peritonitis) ([facility] ICD-10-CM: B87.7 ICD-9-CM: 831.86  3/26/2019 Nausea & vomiting ICD-10-CM: R11.2 ICD-9-CM: 787.01  3/26/2019 Hyperkalemia ICD-10-CM: E87.5 ICD-9-CM: 276.7  3/26/2019 NATE (acute kidney injury) ([facility] ICD-10-CM: N17.9 ICD-9-CM: 584.9  3/26/2019 Normocytic anemia ICD-10-CM: D64.9 ICD-9-CM: 285.9  3/26/2019 Pleural effusion, left ICD-10-CM: J90 ICD-9-CM: 511.9  1/16/2019 Hyponatremia ICD-10-CM: E87.1 ICD-9-CM: 276.1  12/28/2018 Murmur, cardiac ICD-10-CM: R01.1 ICD-9-CM: 785.2  11/20/2018 Essential hypertension ICD-10-CM: I10 
ICD-9-CM: 401.9  10/30/2018 Alcoholic cirrhosis of liver with ascites ([facility] ICD-10-CM: K70.31 ICD-9-CM: 571.2  10/8/2018 Esophageal varices without bleeding (HCC) ICD-10-CM: I85.00 ICD-9-CM: 456.1  10/8/2018 RECOMMENDATIONS: 
Thrombocytopenia 5/23 CBC in ER with platelets count of 8k possibly spurious. Patient received 2 unit platelets with increase to 225k. He received 10 mg dex iin ER and 40 mg pred x 1 dose 4/21. Decision was made by hospitalist to hold steroids and monitor daily. Platelets 817I today. Early stage HCC/end stage cirrhosis 5/22 Follow up with Dr. Stephen Zambrano planned for 5/22. We will reschedule to discuss goals and plan of care. Lab studies and imaging studies were personally reviewed. Pertinent old records were reviewed. Thank you for allowing us to participate in the care of Mr. Jozef Lopez. We will sign off. Please call with any questions. Zachery Boyle NP [facility] [id number] [place] [address] Office : [phone] Fax : [phone]

## 2019-05-21 NOTE — PROGRESS NOTES
Patient not seen in consult as he was unavailable due to [address]. Case reviewed. We will see patient tomorrow.

## 2019-05-21 NOTE — ED NOTES
TRANSFER - OUT REPORT: 
 
Verbal report given to Nadia Young RN on Tory Showers  being transferred to 80 for routine progression of care Report consisted of patients Situation, Background, Assessment and  
Recommendations(SBAR). Information from the following report(s) SBAR, ED Summary, [facility] and Recent Results was reviewed with the receiving nurse. Lines:  
Peripheral IV 05/20/19 Right Forearm (Active) Opportunity for questions and clarification was provided. Patient transported with: 
 [employer]

## 2019-05-21 NOTE — PROGRESS NOTES
TRANSFER - IN REPORT: 
 
Verbal report received from ZENAIDA Valenzuela(name) on Chacorta Delaney  being received from ER(unit) for routine progression of care Report consisted of patients Situation, Background, Assessment and  
Recommendations(SBAR). Information from the following report(s) SBAR, ED Summary and Recent Results was reviewed with the receiving nurse. Opportunity for questions and clarification was provided. Assessment completed upon patients arrival to unit and care assumed.

## 2019-05-22 NOTE — PROGRESS NOTES
Plt count has been consistently above 200K. At this point I think that Mr. Dannielle Gutierrez  initial platelet count of 8k was a lab error. Will go ahead and will order a paracentesis today. Will discuss with GI if patient would be a good candidate for a PleurX catheter.

## 2019-05-22 NOTE — PROGRESS NOTES
TRANSFER - OUT REPORT: 
 
Verbal report given to Linda Moore RN(name) on Selene Katz  being transferred to 2nd floor(unit) for routine progression of care Report consisted of patients Situation, Background, Assessment and  
Recommendations(SBAR). Information from the following report(s) SBAR and MAR was reviewed with the receiving nurse. Lines:  
Peripheral IV 05/20/19 Right Forearm (Active) Site Assessment Clean, dry, & intact 5/22/2019  8:21 AM  
Phlebitis Assessment 0 5/22/2019  8:21 AM  
Infiltration Assessment 0 5/22/2019  8:21 AM  
Dressing Status Clean, dry, & intact 5/22/2019  8:21 AM  
Dressing Type Transparent 5/22/2019  8:21 AM  
Hub Color/Line Status Capped 5/22/2019  8:21 AM  
  
 
Opportunity for questions and clarification was provided. Patient transported with: 
 Registered Nurse

## 2019-05-22 NOTE — PROGRESS NOTES
Attempted to see patient for consult again today however, patient down for paracentesis. Labs reviewed. Case discussed with Dr. Claritza Spencer and agree to hold on steroids and monitor daily CBC. Plan to see patient tomorrow.

## 2019-05-22 NOTE — PROGRESS NOTES
Interventional Radiology Post Paracentesis/Thoracentesis Note 5/22/2019 Procedure(s): Ultrasound guided Diagnostic Paracentesis Performed with 8 Turkish catheter total volume 4270 ml. Samples sent to lab. Preliminary Findings: moderate yellow and cloudy. Complications: None Plan:  Observation, check labs if drawn. Chest X-Ray:  no 
 
Full dictated report to follow

## 2019-05-22 NOTE — PROGRESS NOTES
TRANSFER - IN REPORT: 
 
Verbal report received from  ZENAIDA Harden on Shamar Dexter  being received from IR for routine progression of care Report consisted of patients Situation, Background, Assessment and  
Recommendations(SBAR). Information from the following report(s) SBAR, Procedure Summary, Intake/Output and MAR was reviewed with the receiving nurse. Opportunity for questions and clarification was provided. Assessment completed upon patients arrival to unit and care assumed.

## 2019-05-22 NOTE — CDMP QUERY
Pt admitted with ankle swelling, etoh cirrhosis of liver with ascites  and has malnutrition documented. Please further specify type of malnutrition.  
 
----malnutrition 
----moderate protein renan malnutrition as stated by dietician 
----severe protein renan malnutrition 
----cachexia  
----other 
----unable to determine at this time. The medical record reflects the following: 
 
  Risk Factors:  Alcohol cirrhosis, esophageal varices, ascites. Clinical Indicators: mild muscle and fat loss. Acute illness, BMI 33.6, Treatment: ensure high protein, low sodium diet, Thank you, Ambar Oliva [address] [place] [address] 
[phone]

## 2019-05-22 NOTE — PROGRESS NOTES
Progress Note Patient: Jakub Hernandez MRN: [id number]  SSN: [id number] YOB: 1945  Age: 69 y.o. Sex: male Admit Date: 5/20/2019 LOS: 2 days Subjective: This is a 70 YO male patient with a PMH of cirrhosis and HCC being evaluated for liver transplant at [facility] admitted for severe ascites with SOB and leg edema and found to have a Plt count of 8K. He received 1 U of platelets the day of admission. The next day  his CBC reported a Plt count of 225K. I think the Plt count  of 8K may have been a lab error. Rest of Plt counts have been normal after that. US guided paracentesis ordered today. May need a PleurX catheter. Will discuss this matter with GI. Objective:  
 
Vitals:  
 05/22/19 0745 05/22/19 0956 05/22/19 1105 05/22/19 1217 BP:  119/63 116/65 120/66 Pulse:  (!) 101 97 98 Resp:  18 18 18 Temp:  97.8 °F (36.6 °C)  97.4 °F (36.3 °C) SpO2: 97% 95% 95% 95% Weight:      
Height:      
  
 
Intake and Output: 
Current Shift: 05/22 0701 - 05/22 1900 In: -  
Out: 4670 [Urine:400] Last three shifts: 05/20 1901 - 05/22 0700 In: 586.7 Out: 1600 [Urine:1600] Physical Exam:  
GENERAL: alert, appears stated age EYE: conjunctivae/corneas clear. LYMPHATIC: Cervical, supraclavicular, and axillary nodes normal.  
THROAT & NECK: normal and no erythema or exudates noted. LUNG: clear to auscultation bilaterally HEART: regular rate and rhythm, S1, S2 normal, no murmur, click, rub or gallop ABDOMEN: distended. + for ascites EXTREMITIES: bilateral leg edema SKIN: Normal. 
NEUROLOGIC: No focal deficits PSYCHIATRIC: non focal 
 
Lab/Data Review: All lab results for the last 24 hours reviewed. Assessment:  
 
Active Problems: 
  Alcoholic cirrhosis of liver with ascites ([facility] (10/8/2018) Esophageal varices without bleeding ([address] (10/8/2018) Essential hypertension (10/30/2018) Hyponatremia (12/28/2018) Pleural effusion, left (1/16/2019) Hyperkalemia (3/26/2019) Decompensated hepatic cirrhosis ([address] (5/20/2019) Plan:  
 
- severe ascites with leg edema  
-paracentesis today  
-continue diuretics tomorrow  
-vein US of the lower extremities was negative for DVT  
-Plt count remains normal  
-D dimer high but he has an active liver malignancy. NO clinical evidence of PE. No DVT  
-moderate protein renan malnutrition: continue ensure high protein, low sodium diet. -GI on board Signed By: Jamshid Alexander MD   
 May 22, 2019

## 2019-05-22 NOTE — PROGRESS NOTES
[facility] Progress Note Admit Date:  5/20/2019 Today's Date:  5/22/2019 CC:  Decompensated cirrhosis Subjective:  
 
Patient is feeling fatigued since paracentesis, but his abd is feeling better. He denies any nausea or vomiting. He had a formed BM today with no bleeding. He is tolerating his diet. He has had some SOB with the abd distention. Medications:  
Current Facility-Administered Medications Medication Dose Route Frequency  [START ON 5/23/2019] predniSONE (DELTASONE) tablet 40 mg  40 mg Oral BID WITH MEALS  pantoprazole (PROTONIX) tablet 40 mg  40 mg Oral ACB  
 0.9% sodium chloride infusion 250 mL  250 mL IntraVENous PRN  
 escitalopram oxalate (LEXAPRO) tablet 10 mg  10 mg Oral DAILY  ferrous sulfate 300 mg (60 mg iron)/5 mL oral syrup 27 mg  27 mg Oral ACB  furosemide (LASIX) injection 40 mg  40 mg IntraVENous DAILY  gabapentin (NEURONTIN) capsule 400 mg  400 mg Oral TID  lactulose (CHRONULAC) 10 gram/15 mL solution 15 mL  10 g Oral TID  magnesium oxide (MAG-OX) tablet 400 mg  400 mg Oral DAILY  mirtazapine (REMERON) tablet 15 mg  15 mg Oral QHS  budesonide (PULMICORT) 500 mcg/2 ml nebulizer suspension  500 mcg Nebulization BID RT And  
 albuterol (PROVENTIL VENTOLIN) nebulizer solution 2.5 mg  2.5 mg Nebulization Q6HWA RT  
 sodium chloride (NS) flush 5-40 mL  5-40 mL IntraVENous Q8H  
 sodium chloride (NS) flush 5-40 mL  5-40 mL IntraVENous PRN  
 acetaminophen (TYLENOL) tablet 650 mg  650 mg Oral Q4H PRN  
 ondansetron (ZOFRAN) injection 4 mg  4 mg IntraVENous Q4H PRN  
 ciprofloxacin HCl (CIPRO) tablet 500 mg  500 mg Oral Q24H  
 spironolactone (ALDACTONE) tablet 100 mg  100 mg Oral DAILY Review of Systems: ROS was obtained, with pertinent positives as listed above. No chest pain. He has had coughing with some wheezing. Diet:  Cardiac, regular, 2 gm Na+ Objective:  
Vitals: 
Visit Vitals /63 (BP 1 Location: Left arm, BP Patient Position: At rest) Pulse (!) 101 Temp 97.8 °F (36.6 °C) Resp 18 Ht 5' 8\" (1.727 m) Wt 100.2 kg (221 lb) SpO2 95% BMI 33.60 kg/m² Intake/Output: 
05/22 0701 - 05/22 1900 In: -  
Out: 400 [Urine:400] 05/20 1901 - 05/22 0700 In: 586.7 Out: 1600 [Urine:1600] Exam: 
General appearance: alert, cooperative, no distress, lying in bed 
Lungs: coarse BS with some wheezing bilaterally anteriorly Heart: tachycardic Abdomen: soft, distended, non-tender. Bowel sounds normal. No masses, no organomegaly Neuro:  alert and oriented x 3, no asterixis Data Review (Labs):   
Recent Labs  
  05/22/19 
0446 05/21/19 
1556 05/21/19 
1219 05/21/19 
0910 05/21/19 
0540 05/20/19 
1815 05/20/19 
1713 05/20/19 
1658 WBC 12.1* 7.1  --  4.1*  --  5.5  --   --   
HGB 7.7* 7.9*  --  8.8*  --  9.2*  --   --   
HCT 24.2* 26.1*  --  27.6*  --  28.3*  --   --   
 202  --  225  --  8*  --   -- MCV 87.1 88.5  --  86.0  --  85.0  --   --   
*  --   --   --  133*  --   --  131* K 4.6  --   --   --  4.4  --   --  4.2 CL 97*  --   --   --  98  --   --  97* CO2 24  --   --   --  27  --   --  25 BUN 13  --   --   --  10  --   --  10  
CREA 1.06  --   --   --  1.12  --   --  0.98  
CA 8.4  --   --   --  8.5  --   --  8.1*  
MG 2.3  --   --   --   --   --   --  1.9 *  --   --   --  181*  --   --  112* *  --   --   --  245*  --   --  275* SGOT 37  --   --   --  46*  --   --  56* ALT 19  --   --   --  25  --   --  26  
TBILI 1.0  --  1.4*  --  1.6*  --   --  1.5* CBIL  --   --  0.8*  --   --   --   --   --   
ALB 2.6*  --   --   --  2.4*  --   --  2.4*  
TP 6.5  --   --   --  6.7  --   --  6.8 LPSE  --   --   --   --   --   --   --  304 PTP  --   --   --   --   --   --  17.5*  --   
INR  --   --   --   --   --   --  1.5  --   
APTT  --   --   --   --   --   --  38.6  --   
 
  Ref.  Range 3/27/2019 09:52  
 Lactic acid Latest Ref Range: 0.4 - 2.0 MMOL/L 1.4 Procalcitonin Latest Units: ng/mL 1.9 Ammonia Latest Ref Range: 11 - 32 UMOL/L 89 (H)  
  
  Ref. Range 3/25/2019 11:08 BODY FLUID TYPE Latest Units:   PARACENTESIS FLUID FLUID APPEARANCE Latest Units:   HAZY FLUID COLOR Latest Units:   YELLOW  
FLUID RBC CT. Latest Units: /cu mm <1,000 FLUID WBC COUNT Latest Units: /cu mm 128 FLD NEUTROPHILS Latest Units: % 4 FLD LYMPHS Latest Units: % 95 FLD MONO/MACROPHAGES Latest Units: % 1  
  
CULTURE, BODY FLUID [place] [[id number]] (Order [id number]) Microbiology  
                     
Date: 3/25/2019 Department: [facility] Released By:  (auto-released) Authorizing: ROCIO Montes Specimen Information: Paracentesis Fluid  
     
Component Value Flag Ref Range Units Status Special Requests:         Final  
NO SPECIAL REQUESTS   
GRAM STAIN 0 TO 1 WBC/OIF        Final  
GRAM STAIN         Final  
FEW GRAM POSITIVE COCCI Culture result: Abnormal          Final  
MODERATE STREPTOCOCCUS SALIVARIUS Susceptibility Streptococcus salivarius              
Antibiotic Interpretation Value Method Comment Penicillin G ($$) Intermediate 0.5 ug/mL VENTURA    
Clindamycin ($) Susceptible 0.0625 ug/mL VENTURA    
Vancomycin ($) Susceptible 0.5 ug/mL VENTURA    
Cefepime ($$) Susceptible 0.25 ug/mL VENTURA    
Ceftriaxone ($) Susceptible 0.125 ug/mL VENTURA    
Amoxicillin Intermediate 0.5 ug/mL VENTURA    
  
Ultrasound guided paracentesis. 25 March 2019 History: Cirrhosis, ascites.  
: Rukhsana Lamas PA-C  
Supervising Physician: Kera Han M.D.  
Consent:  Informed written and oral consent was obtained from the patient after 
the risks and benefits were discussed.  All questions were answered and the 
patient requested that we proceed.  
Procedure:  Maximal sterile barrier technique was used.  Following routine prep 
and drape of the abdomen, a local field block with 1% lidocaine was achieved. Ultrasound evaluation was performed.   
Fluid was identified in the peritoneal cavity. Using real-time ultrasound 
guidance, the peritoneal cavity was accessed with an 8 [place] centesis catheter. 
 The catheter was connected to Vacutainer bottles.  
A total of 6400 cc of thin yellow fluid was removed and sent to the lab. The 
catheter was removed and a bandage applied.  
Complications: None.  Findings: Large-volume ascites.  IMPRESSION Impression: Uncomplicated ultrasound guided paracentesis. 
  
CT of the abdomen and pelvis without contrast. 26 March 2019 ADDITIONAL CLINICAL INFORMATION: Cirrhosis with paracentesis yesterday now with 
abdominal distention with nausea and vomiting. Comparison: Abdominal ultrasound dated March 25, 2019.  
Multiple axial images were obtained through the abdomen and pelvis without IV 
contrast.  Radiation dose reduction techniques were used for this study:  All CT 
scans performed at this facility use one or all of the following: Automated 
exposure control, adjustment of the mA and/or kVp according to patient's size, 
iterative reconstruction.  
FINDINGS: 
LOWER THORAX: Moderately sized left-sided pleural effusion with atelectatic 
change of the left lung base. Extensive coronary artery calcifications.  LIVER: Atrophic liver with prominent macronodular contour consistent with 
cirrhosis, slight caudate lobe hypertrophy. Cannot evaluate for hepatic lesions 
with the lack of IV contrast.  
BILIARY SYSTEM: The gallbladder is contracted with slight wall thickening and 
with evidence of cholelithiasis.  
SPLEEN: Borderline splenomegaly.  
PANCREAS: No pancreatic mass. No pancreatic duct dilation.  ADRENALS: No adrenal nodule or adrenal hypertrophy.  
URINARY SYSTEM: Left lower pole exophytic renal cyst measuring 3 cm.  Left 
superior pole exophytic 1.9 cm intermediate density lesion which is incompletely 
characterized.  
 FLUID: [place] is a large volume of ascites within the abdomen and pelvis.  
REPRODUCTIVE ORGANS: Unremarkable.  
BOWEL: There is diffuse wall thickening of the ascending and transverse colon 
which may be related to patient's cirrhosis. No evidence of bowel structure. There is diffuse wall thickening of the small bowel.  
VASCULAR/NODES: No aortic or iliac artery aneurysm. No lymphadenopathy. Perigastric and paraesophageal varices are noted.  
BONES/SUBCUTANEOUS TISSUE: Mild bilateral gynecomastia.  IMPRESSION IMPRESSION: 
1. Macronodular cirrhosis with sequela of portal hypertension with mild 
splenomegaly and with perigastric and periesophageal varices evident. Large 
volume of intra-abdominal ascites. 2. Moderately sized left-sided pleural effusion. 3. 1.9 cm intermediate density lesion of the left kidney which is exactly 
characterize recommend follow-up with MRI or renal mass CT. 4. Diffuse wall thickening of the ascending and transverse colon and mild wall 
thickening of the small bowel favored related to fluid status and cirrhosis. 
  
Ultrasound guided paracentesis 13 May 2019 Procedure:  Maximal sterile barrier technique was used.  Following routine prep 
and drape of the abdomen, a local field block with 1% lidocaine was achieved. Ultrasound evaluation was performed. Fluid was identified in the peritoneal cavity. Using real-time ultrasound 
guidance, the peritoneal cavity was accessed with an 8 [place] centesis catheter. 
 The catheter was connected to Vacutainer bottles. A total of 6500 cc of thin yellow fluid was removed. The catheter was removed 
and a bandage applied. Complications: None. Findings: Large-volume ascites. IMPRESSION Impression: Uncomplicated ultrasound guided paracentesis. Ultrasound guided paracentesis. 22 May 2019  
History: Alcohol-related Cirrhosis, ascites.  
:  Norman Burt PA-C  
Supervising Physician: Angel Burgos M.D.  Consent:  Informed written and oral consent was obtained from the patient after 
the risks and benefits were discussed. All questions were answered and the 
patient requested that we proceed.  
Procedure:  Maximal sterile barrier technique was used. Following routine prep 
and drape of the abdomen, a local field block with 1% lidocaine was achieved. Ultrasound evaluation was performed.   
Fluid was identified in the peritoneal cavity. Using real-time ultrasound 
guidance, the peritoneal cavity was accessed with an 8 [place] centesis catheter. The catheter was connected to wall suction.  
A total of 4270 cc of thin yellow fluid was removed. A sample was sent to the 
lab The catheter was removed and a bandage applied.  
Complications: None.  Findings: Large-volume ascites.  IMPRESSION Impression: Uncomplicated ultrasound guided paracentesis. Assessment:  
 
Active Problems: 
  Alcoholic cirrhosis of liver with ascites ([address] (10/8/2018) Esophageal varices without bleeding ([address] (10/8/2018) Essential hypertension (10/30/2018) Hyponatremia (12/28/2018) Pleural effusion, left (1/16/2019) Hyperkalemia (3/26/2019) Decompensated hepatic cirrhosis ([address] (5/20/2019) 68 yo male with PMH of  ETOH abuse (sober since July 2018), [address] (dx 4/2019 no treatment currently), alcoholic cirrhosis, with ascites and frequent paracentesis/esophageal varices/HE, ileum ulcer, HTN, chronic left pleural effusion, chronic cardiac murmurm who was seen in consultation 21 May 2019 at the request of Dr. Bree Rivera for cirrhosis, after admitted with worsening edema and ascites. He was seen at [facility] (Dr. Haydee Bowman) last week and has liver transplant eval mid June. Na+ low at 133, plt very low at 8k - lab error as recheck has been in 200 range, Cr 1.12. TIPs discussed in the past, but HE during last admission. MELD score 18 on 20 May 2019. S/p paracentesis today. Plan:  
 
-Supportive care, IVF. -Continue Lasix 40 mg daily and Aldactone 100 mg daily, as long as renal function tolerates. -Continue Lactulose (has been on Xifaxin but no signs of current HE). Titrate Lactulose to 3 BMs per day. -On Cipro for SBP prophylaxis. 
-Cardiac, regular, 2 gm Na+. Dietitian discussed diet with pt on 21 May 2019. 
-Therapeutic paracentesis PRN. -Hold off on PleurX catheter as pt is undergoing liver transplant evaluation and is not going on hospice.   
-Follow. Patient is seen and examined in collaboration with Dr. Zhong Degree. Assessment and plan as per Dr. Ran Christy. [facility]

## 2019-05-23 NOTE — PROGRESS NOTES
Progress Note Patient: Gabrielle French MRN: [id number]  SSN: [id number] YOB: 1945  Age: 69 y.o. Sex: male Admit Date: 5/20/2019 LOS: 3 days Subjective:  
 
Patient was seen at bedside. Feels better after paracentesis. Will continue IV lasix and oral aldactone. Objective:  
 
Vitals:  
 05/23/19 0731 05/23/19 1204 05/23/19 1437 05/23/19 1446 BP:  113/63 124/81 Pulse:  (!) 103 94 Resp:  18 18 Temp:  98.4 °F (36.9 °C) 97.9 °F (36.6 °C) SpO2: 94% 98% 96% 94% Weight:      
Height:      
  
 
Intake and Output: 
Current Shift: 05/23 0701 - 05/23 1900 In: -  
Out: 1000 [Urine:1000] Last three shifts: 05/21 1901 - 05/23 0700 In: 120 [P.O.:120] Out: 2453 [Urine:800] Physical Exam:  
GENERAL: alert, appears stated age EYE: conjunctivae/corneas clear. LYMPHATIC: Cervical, supraclavicular, and axillary nodes normal.  
THROAT & NECK: normal and no erythema or exudates noted. LUNG: clear to auscultation bilaterally HEART: regular rate and rhythm, S1, S2 normal, no murmur, click, rub or gallop ABDOMEN: distended. + for ascites, better after paracentesis EXTREMITIES: bilateral leg edema. Improving SKIN: Normal. 
NEUROLOGIC: No focal deficits PSYCHIATRIC: non focal 
 
Lab/Data Review: All lab results for the last 24 hours reviewed. Assessment:  
 
Active Problems: 
  Alcoholic cirrhosis of liver with ascites ([address] (10/8/2018) Esophageal varices without bleeding ([address] (10/8/2018) Essential hypertension (10/30/2018) Hyponatremia (12/28/2018) Pleural effusion, left (1/16/2019) Hyperkalemia (3/26/2019) Decompensated hepatic cirrhosis ([address] (5/20/2019) Plan:  
 
- severe ascites with leg edema  
-paracentesis yesterday.  Fluid NOT suggestive of SBP  
-continue diuretics 
-vein US of the lower extremities was negative for DVT  
-Plt count remains normal  
 -D dimer high but he has an active liver malignancy. NO clinical evidence of PE. No DVT  
-moderate protein renan malnutrition: continue ensure high protein, low sodium diet. -GI on board. Appreciate input. Signed By: Duke Walden MD   
 May 23, 2019

## 2019-05-24 NOTE — PROGRESS NOTES
Progress Note Patient: Kapil Godfrey MRN: [id number]  SSN: [id number] YOB: 1945  Age: 69 y.o. Sex: male Admit Date: 5/20/2019 LOS: 4 days Subjective:  
 
Patient was seen at bedside. Had paracentesis on 5/22. Spoke with his transplant team at [facility] today. They are aware of his admission. Objective:  
 
Vitals:  
 05/24/19 0731 05/24/19 0741 05/24/19 1051 05/24/19 1429 BP: 126/77  126/80 Pulse: (!) 109  96 Resp: 18  18 Temp: 98 °F (36.7 °C)  97.4 °F (36.3 °C) SpO2: 94% 95% 96% 97% Weight:      
Height:      
  
 
Intake and Output: 
Current Shift: 05/24 0701 - 05/24 1900 In: 350 [I.V.:350] Out: 1725 [BVDJE:4078] Last three shifts: 05/22 1901 - 05/24 0700 In: -  
Out: 1390 [RQUJP:1124] Physical Exam:  
GENERAL: alert, appears stated age EYE: conjunctivae/corneas clear. LYMPHATIC: Cervical, supraclavicular, and axillary nodes normal.  
THROAT & NECK: normal and no erythema or exudates noted. LUNG: clear to auscultation bilaterally HEART: regular rate and rhythm, S1, S2 normal, no murmur, click, rub or gallop ABDOMEN: distended. + for ascites, better after paracentesis EXTREMITIES: bilateral leg edema. Improving SKIN: Normal. 
NEUROLOGIC: No focal deficits PSYCHIATRIC: non focal 
 
Lab/Data Review: All lab results for the last 24 hours reviewed. Assessment:  
 
Active Problems: 
  Alcoholic cirrhosis of liver with ascites ([address] (10/8/2018) Esophageal varices without bleeding ([address] (10/8/2018) Essential hypertension (10/30/2018) Hyponatremia (12/28/2018) Pleural effusion, left (1/16/2019) Hyperkalemia (3/26/2019) Decompensated hepatic cirrhosis ([address] (5/20/2019) Plan:  
 
-paracentesis yesterday.  Fluid NOT suggestive of SBP  
-continue diuretics, still has an important edema of the lower extremities  
-vein US of the lower extremities was negative for DVT  
-Plt count remains normal  
 -D dimer high but he has an active liver malignancy. NO clinical evidence of PE. No DVT  
-moderate protein renan malnutrition: continue ensure high protein, low sodium diet.  
-case discussed with his transplant team at [facility] today Signed By: Romel Osman MD   
 May 24, 2019

## 2019-05-24 NOTE — PROGRESS NOTES
Patient was calm No family present Patient receptive to  presence Patient was very engaging in conversation about his illness and his treatment plan Patient blamed himself (lifestyle) for his problems. Quinton Kai he has not drank in a year. Patient is in process of evaluation at [facility] for transplant Patient said it is a very detailed process and if approved could be long wait due to demand Used visit to build rapport with patient Will follow thru his admission.  
 
Maria T Russell, staff [address], [id number] [facility] Pat[place]  /   [email or web address]

## 2019-05-25 NOTE — PROGRESS NOTES
END OF SHIFT NOTE: 
 
INTAKE/OUTPUT 
05/24 0701 - 05/25 0700 In: 350 [I.V.:350] Out: 8885 [Urine:2475] Voiding: YES Catheter: NO 
Drain:   
 
 
 
 
 
Flatus: Patient does have flatus present. Stool:  0 occurrences. Characteristics: 
Stool Assessment Stool Color: Yellow, Aissatou Ng Stool Appearance: Watery, Formed, Soft Stool Amount: Small Stool Source/Status: Rectum Emesis: 0 occurrences. Characteristics: VITAL SIGNS Patient Vitals for the past 12 hrs: 
 Temp Pulse Resp BP SpO2  
05/25/19 1748 98.4 °F (36.9 °C) 84 18 116/65 96 % 05/25/19 1647 98.8 °F (37.1 °C) 83 18 119/65 96 % 05/25/19 1627 98.4 °F (36.9 °C) 87 17 131/65 97 % 05/25/19 1133 98.4 °F (36.9 °C) 86 18 128/70 97 % 05/25/19 0814  89  128/75  Pain Assessment Pain Intensity 1: 0 (05/25/19 0316) Patient Stated Pain Goal: 0 Ambulating Yes Shift report given to oncoming nurse at the bedside.  
 
Cristi Whipple RN

## 2019-05-25 NOTE — PROGRESS NOTES
END OF SHIFT NOTE: 
 
INTAKE/OUTPUT 
05/24 0701 - 05/25 0700 In: 350 [I.V.:350] Out: 1636 [Urine:2475] Voiding: YES Catheter: NO 
Drain:   
 
 
 
 
 
Flatus: Patient does have flatus present. Stool:  0 occurrences. Characteristics: 
Stool Assessment Stool Color: Yellow, Desma Pickle Stool Appearance: Watery, Formed, Soft Stool Amount: Small Stool Source/Status: Rectum Emesis: 0 occurrences. Characteristics: VITAL SIGNS Patient Vitals for the past 12 hrs: 
 Temp Pulse Resp BP SpO2  
05/25/19 0335 99.1 °F (37.3 °C) (!) 102 21 106/65 92 % 05/24/19 2324 98.3 °F (36.8 °C) 99 24 126/69 96 % 05/24/19 1953 98.6 °F (37 °C) 95 24 122/74 98 % Pain Assessment Pain Intensity 1: 0 (05/25/19 0316) Patient Stated Pain Goal: 0 Ambulating Yes Shift report given to oncoming nurse at the bedside.  
 
Ismael Zamora RN

## 2019-05-25 NOTE — PROGRESS NOTES
Progress Note Patient: John Silver MRN: [id number]  SSN: [id number] YOB: 1945  Age: 69 y.o. Sex: male Admit Date: 5/20/2019 LOS: 5 days Subjective:  
 
Patient was seen at bedside. Had paracentesis on 5/22. Has a very important fluid retention. On IV lasix. Will order 1 U of pRBCs today Objective:  
 
Vitals:  
 05/25/19 2833 05/25/19 7437 05/25/19 3424 05/25/19 1133 BP: 106/65 128/75 128/75 128/70 Pulse: (!) 102 89 89 86 Resp: 21 18 18 Temp: 99.1 °F (37.3 °C) 98.1 °F (36.7 °C)  98.4 °F (36.9 °C) SpO2: 92% 95%  97% Weight:      
Height:      
  
 
Intake and Output: 
Current Shift: 05/25 0701 - 05/25 1900 In: -  
Out: 500 [Urine:500] Last three shifts: 05/23 1901 - 05/25 0700 In: 350 [I.V.:350] Out: 3382 [Urine:2475] Physical Exam:  
GENERAL: alert, appears stated age EYE: conjunctivae/corneas clear. LYMPHATIC: Cervical, supraclavicular, and axillary nodes normal.  
THROAT & NECK: normal and no erythema or exudates noted. LUNG: clear to auscultation bilaterally HEART: regular rate and rhythm, S1, S2 normal, no murmur, click, rub or gallop ABDOMEN: distended. + for ascites, better after paracentesis EXTREMITIES: bilateral leg edema. Improving SKIN: Normal. 
NEUROLOGIC: No focal deficits PSYCHIATRIC: non focal 
 
Lab/Data Review: All lab results for the last 24 hours reviewed. Assessment:  
 
Active Problems: 
  Alcoholic cirrhosis of liver with ascites ([address] (10/8/2018) Esophageal varices without bleeding ([address] (10/8/2018) Essential hypertension (10/30/2018) Hyponatremia (12/28/2018) Pleural effusion, left (1/16/2019) Hyperkalemia (3/26/2019) Decompensated hepatic cirrhosis ([address] (5/20/2019) Plan:  
 
-paracentesis on 5/23. Fluid NOT suggestive of SBP  
-continue IVdiuretics, still has an important edema of the lower extremities  
-vein US of the lower extremities was negative for DVT -Plt count remains normal  
-1 U of pRBCs ordered today  
-D dimer high but he has an active liver malignancy. NO clinical evidence of PE. No DVT  
-moderate protein renan malnutrition: continue ensure high protein, low sodium diet.  
-case discussed with his transplant team at [facility] on 5/24 Signed By: Mercedes Kerr MD   
 May 25, 2019

## 2019-05-26 NOTE — PROGRESS NOTES
Dietary tech calls to report patient is on the floor. Patient is found sitting on the floor by the recliner between the bed and bathroom. He states \"I was reaching for the remote on the floor\". He has two large skin tears to bilateral forearms, one on L hand and small on top upper L thigh. All abrassions covered with telfa and kerlix, Dr Olvin Barboza on the floor notified. Wife called by patient, she on her way to the hospital. Patient is alert and oriented x4, VSS. Patient placed on bed alarm.

## 2019-05-26 NOTE — PROGRESS NOTES
Progress Note Patient: Nichole Irene MRN: [id number]  SSN: [id number] YOB: 1945  Age: 69 y.o. Sex: male Admit Date: 5/20/2019 LOS: 6 days Subjective:  
 
Patient was seen at bedside. Had paracentesis on 5/22. Has a very important fluid retention. On IV lasix. S/p blood transfusion yesterday. Hgb up to 8.7. Today he was trying to  the remote control from the floor and fell down. Suffered lacerations to this R and left forearms and left hand. Silver sulfadiazine ordered. Ammonia up to 80. Will add rifaximin. Objective:  
 
Vitals:  
 05/26/19 0720 05/26/19 0854 05/26/19 1137 05/26/19 1451 BP: 122/65 118/70 130/74 118/72 Pulse: 88 77 88 76 Resp: 18 19 19 18 Temp: 98.2 °F (36.8 °C) 98.2 °F (36.8 °C) 98.1 °F (36.7 °C) 98 °F (36.7 °C) SpO2: 95% 94% 97% 95% Weight:      
Height:      
  
 
Intake and Output: 
Current Shift: 05/26 0701 - 05/26 1900 In: 600 [P.O.:600] Out: 700 [Urine:700] Last three shifts: 05/24 1901 - 05/26 0700 In: 377.1 Out: 1700 [Urine:1700] Physical Exam:  
GENERAL: alert, appears stated age EYE: conjunctivae/corneas clear. LYMPHATIC: Cervical, supraclavicular, and axillary nodes normal.  
THROAT & NECK: normal and no erythema or exudates noted. LUNG: clear to auscultation bilaterally HEART: regular rate and rhythm, S1, S2 normal, no murmur, click, rub or gallop ABDOMEN: distended. + for ascites, better after paracentesis EXTREMITIES: bilateral leg edema. Improving SKIN: Normal. 
NEUROLOGIC: No focal deficits PSYCHIATRIC: non focal 
 
Lab/Data Review: All lab results for the last 24 hours reviewed. Assessment:  
 
Active Problems: 
  Alcoholic cirrhosis of liver with ascites ([address] (10/8/2018) Esophageal varices without bleeding ([address] (10/8/2018) Essential hypertension (10/30/2018) Hyponatremia (12/28/2018) Pleural effusion, left (1/16/2019) Hyperkalemia (3/26/2019) Decompensated hepatic cirrhosis ([facility] (5/20/2019) Plan:  
 
-paracentesis on 5/23. Fluid NOT suggestive of SBP  
-continue IVdiuretics, still has an important edema of the lower extremities but is evidently improving  
-vein US of the lower extremities was negative for DVT  
-Plt count remains normal  
-1 U of pRBCs ordered on 5/25  
-D dimer high but he has an active liver malignancy. NO clinical evidence of PE. No DVT  
-moderate protein renan malnutrition: continue ensure high protein, low sodium diet.  
-case discussed with his transplant team at [facility] on 5/24 
-rifaximin added today as his ammonia is up to 80 
-silver sulfadiazine for his forearm lacerations Signed By: Ronal Valencia MD   
 May 26, 2019

## 2019-05-26 NOTE — PROGRESS NOTES
END OF SHIFT NOTE: 
 
INTAKE/OUTPUT 
05/25 0701 - 05/26 0700 In: 377.1 Out: 1300 [Urine:1300] Voiding: YES Catheter: NO 
Drain:   
 
 
 
 
 
Flatus: Patient does have flatus present. Stool:  1 occurrences. Characteristics: 
Stool Assessment Stool Color: Magalene Poplin Stool Appearance: Loose Stool Amount: Medium Stool Source/Status: Rectum Emesis: 0 occurrences. Characteristics: VITAL SIGNS Patient Vitals for the past 12 hrs: 
 Temp Pulse Resp BP SpO2  
05/26/19 1451 98 °F (36.7 °C) 76 18 118/72 95 % 05/26/19 1137 98.1 °F (36.7 °C) 88 19 130/74 97 % 05/26/19 0854 98.2 °F (36.8 °C) 77 19 118/70 94 % Pain Assessment Pain Intensity 1: 0 (05/26/19 0255) Patient Stated Pain Goal: 0 Ambulating Yes Shift report given to oncoming nurse at the bedside.  
 
Emir Pena RN

## 2019-05-26 NOTE — PROGRESS NOTES
END OF SHIFT NOTE: 
 
INTAKE/OUTPUT 
05/25 0701 - 05/26 0700 In: 377.1 Out: 1300 [Urine:1300] Voiding: YES Catheter: NO 
Drain:   
 
 
 
 
 
Flatus: Patient does have flatus present. Stool:  0 occurrences. Characteristics: 
Stool Assessment Stool Color: Yellow, Aniket Countess Stool Appearance: Watery, Formed, Soft Stool Amount: Small Stool Source/Status: Rectum Emesis: 0 occurrences. Characteristics: VITAL SIGNS Patient Vitals for the past 12 hrs: 
 Temp Pulse Resp BP SpO2  
05/26/19 0400 98.3 °F (36.8 °C) 91 17 107/65 92 % 05/25/19 2351 99.2 °F (37.3 °C) 89 18 118/65 94 % 05/25/19 2026 97.8 °F (36.6 °C) 90 19 130/71 97 % 05/25/19 1940 98 °F (36.7 °C) 83 17 133/80 97 % Pain Assessment Pain Intensity 1: 0 (05/26/19 0255) Patient Stated Pain Goal: 0 Ambulating Yes Shift report given to oncoming nurse at the bedside.  
 
Justin Valdivia RN

## 2019-05-27 NOTE — PROGRESS NOTES
Problem: Mobility Impaired (Adult and Pediatric) Goal: *Acute Goals and Plan of Care (Insert Text) Description LTG: 
(1.)Mr. Gemma Winter will move from supine to sit and sit to supine, scoot up and down and roll side to side in bed with INDEPENDENT within 7 day(s). (2.)Mr. Gemma Winter will transfer from bed to chair and chair to bed with INDEPENDENT using the least restrictive device within 7 day(s). (3.)Mr. Gemma Winter will ambulate with modified INDEPENDENCE for 500+ feet with the least restrictive/no device within 7 day(s). (4.)Mr. Gemma Winter will perform standing static and dynamic balance activities x 8 minutes with SUPERVISION to improve safety within 7 day(s). (5.)Mr. Gemma Winter will ascend and descend 3 stairs using one hand rail(s) with SUPERVISION to improve functional mobility and safety within 7 day(s). Outcome: Progressing Towards Goal 
  
PHYSICAL THERAPY: Initial Assessment and Daily Note 5/27/2019 INPATIENT: PT Visit Days : 1 Payor: [employer] FIRST CHOICE / Plan: [facility] FIRST CHOICE / Product Type: Managed Care Medicare /   
  
NAME/AGE/GENDER: Prudencio Brooks is a 69 y.o. male PRIMARY DIAGNOSIS: Decompensated hepatic cirrhosis (HCC) [K72.90] <principal problem not specified> 
 <principal problem not specified> 
 
  
  
ICD-10: Treatment Diagnosis:  
 Other abnormalities of gait and mobility (R26.89) History of falling (Z91.81) Precaution/Allergies: 
Hydromet [hydrocodone-homatropine] ASSESSMENT:  
 
Mr. Gemma Winter presents sitting in recliner with wife at bedside. At baseline he ambulates with quad cane in home and RW in community. He fell recently while trying to bend over and  a remote. Patient stands with modified independence, able to perform Romberg, but unable to perform tandem stand. Only able to perform single leg stance for 3 seconds. Dynamic standing balance poor. He then ambulated 250' with RW and CGA. Sit to supine independently. Mr. Roxy Mae would benefit from skilled physical therapy (medically necessary) to address his deficits and maximize his function. Initiated treatment to include exercises below to increase balance and activity tolerance. Good effort, no progress yet. This section established at most recent assessment PROBLEM LIST (Impairments causing functional limitations): 
Decreased Transfer Abilities Decreased Ambulation Ability/Technique Decreased Balance Decreased Activity Tolerance INTERVENTIONS PLANNED: (Benefits and precautions of physical therapy have been discussed with the patient.) Balance Exercise Bed Mobility Gait Training Therapeutic Activites Therapeutic Exercise/Strengthening Transfer Training 
education TREATMENT PLAN: Frequency/Duration: 3 times a week for 1 week Rehabilitation Potential For Stated Goals: Good REHAB RECOMMENDATIONS (at time of discharge pending progress):   
Placement: It is my opinion, based on this patient's performance to date, that Mr. Roxy Mae may benefit from [address] after discharge due to the functional deficits listed above that are likely to improve with skilled rehabilitation because he/she has multiple medical issues that affect his/her functional mobility in the community. Equipment:  
None at this time HISTORY:  
History of Present Injury/Illness (Reason for Referral): 
Per MD note, \"Patient 74WM with hx of ETOH abuse (sober since July 2018), [address] esophageal varcies, ileum ulcer, HTN, alcoholic cirrhosis with ascites and frequent paracentesis, chronic left pleural effusion, chronic cardiac murmur, presents with complaint of increasing abdominal and LE swelling. Reports his lasix \"isn't working anymore\". Pt typically sees [address] ([facility]) but has not been back since eval for liver transplant started at [facility].  He was diagnosed with [facility] by biopsy in April 2019 and follows with [facility]. His most recent paracentesis was 5/13 with 6.5 liters removed\" Past Medical History/Comorbidities:  
Mr. Dye Client  has a past medical history of Bronchitis, Cirrhosis of liver with ascites ([facility] Hypertension, and Ulcer of ileum. Mr. Dye Client  has a past surgical history that includes hx orthopaedic; hx orthopaedic; and hx appendectomy. Social History/Living Environment:  
Home Environment: Apartment # Steps to Enter: 2 Rails to Enter: Yes Hand Rails : Bilateral 
One/Two Story Residence: One story Living Alone: No 
Support Systems: Family member(s), Spouse/Significant Other/Partner Patient Expects to be Discharged to[de-identified] [facility] Current DME Used/Available at Home: [employer], quad, Walker, rolling Prior Level of Function/Work/Activity: 
Lives at home with wife. Uses quad cane in home and RW in community. Mod Ind. Number of Personal Factors/Comorbidities that affect the Plan of Care: 1-2: MODERATE COMPLEXITY EXAMINATION:  
Most Recent Physical Functioning:  
Gross Assessment: 
AROM: Generally decreased, functional 
Strength: Generally decreased, functional 
         
  
Posture: 
Posture (WDL): Exceptions to [facility] Posture Assessment: Forward head, Rounded shoulders Balance: 
Sitting: Intact Standing: Impaired Standing - Static: Fair Standing - Dynamic : Poor Bed Mobility: 
Sit to Supine: Independent Scooting: Independent Wheelchair Mobility: 
  
Transfers: 
Sit to Stand: Modified independent Stand to Sit: Modified independent Bed to Chair: Supervision Gait: 
  
Base of Support: Center of gravity altered Speed/Aleyda: Slow Gait Abnormalities: Decreased step clearance Distance (ft): 250 Feet (ft) Assistive Device: Walker, rolling Ambulation - Level of Assistance: Contact guard assistance Body Structures Involved: Metabolic Body Functions Affected: Movement Related Metobolic/Endocrine Activities and Participation Affected: Mobility Community, Social and [place] Number of elements that affect the Plan of Care: 1-2: LOW COMPLEXITY CLINICAL PRESENTATION:  
Presentation: Stable and uncomplicated: LOW COMPLEXITY CLINICAL DECISION MAKING:  
[employer] AM-PAC? ?6 Clicks? Basic Mobility Inpatient Short Form How much difficulty does the patient currently have. .. Unable A Lot A Little None 1. Turning over in bed (including adjusting bedclothes, sheets and blankets)? ? 1   ? 2   ? 3   ? 4  
2. Sitting down on and standing up from a chair with arms ( e.g., wheelchair, bedside commode, etc.)   ? 1   ? 2   ? 3   ? 4  
3. Moving from lying on back to sitting on the side of the bed?   ? 1   ? 2   ? 3   ? 4 How much help from another person does the patient currently need. .. Total A Lot A Little None 4. Moving to and from a bed to a chair (including a wheelchair)? ? 1   ? 2   ? 3   ? 4  
5. Need to walk in hospital room? ? 1   ? 2   ? 3   ? 4  
6. Climbing 3-5 steps with a railing? ? 1   ? 2   ? 3   ? 4  
© 2007, Trustees of [facility] [employer], under license to [employer]. All rights reserved Score:  Initial: 21 Most Recent: X (Date: -- ) Interpretation of Tool:  Represents activities that are increasingly more difficult (i.e. Bed mobility, Transfers, Gait). Medical Necessity:    
Patient is expected to demonstrate progress in strength, balance and functional technique 
 to increase independence with   and improve safety during all functional mobility. . 
Reason for Services/Other Comments: 
Patient continues to require skilled intervention due to decline in functional mobility. .  
Use of outcome tool(s) and clinical judgement create a POC that gives a: Clear prediction of patient's progress: LOW COMPLEXITY  
  
 
 
 
TREATMENT:  
(In addition to Assessment/Re-Assessment sessions the following treatments were rendered) Pre-treatment Symptoms/Complaints:  none. Pain: Initial: Pain Intensity 1: 0  Post Session:  0 Therapeutic Exercise: ( 10 minutes):  Exercises per grid below to improve mobility, strength and balance. Required minimal visual and verbal cues to promote proper body alignment. Progressed complexity of movement as indicated. Hands on RW for balance. Date: 5/27/19 Ambulation Device 
assistance Partial Squats Hip Abduction/ Adduction Standing 2x10 Heel Raises Standing x20 Toe Raises Standing x20 Hip Flexion Standing Sit to Stand Key:  R=right, L=left, B=bilaterally, A=active, AA=active assisted, P=passive Braces/Orthotics/Lines/Etc:  
O2 Device: Room air Treatment/Session Assessment:   
Response to Treatment:  pleasant and cooperative. Interdisciplinary Collaboration:  
Physical Therapist 
Registered Nurse Certified Nursing Assistant/Patient Care Technician After treatment position/precautions:  
Supine in bed Bed alarm/tab alert on Bed/Chair-wheels locked Bed in low position Call light within reach RN notified Family at bedside Compliance with Program/Exercises: Will assess as treatment progresses Recommendations/Intent for next treatment session: \"Next visit will focus on advancements to more challenging activities and reduction in assistance provided\". Total Treatment Duration: PT Patient Time In/Time Out Time In: 1039 Time Out: 1050 Donta Palomares PT, DPT

## 2019-05-27 NOTE — PROGRESS NOTES
Dispo update:  Spoke briefly to Mr. Reyna David and his wife again in room 211 about discharge planning. Plan is same:  Home and resume [facility] home health RN Paige Harding, [phone]) and PT. He does not want STR at a SNF.

## 2019-05-27 NOTE — PROGRESS NOTES
Progress Note Patient: Cyndi aDng MRN: [id number]  SSN: [id number] YOB: 1945  Age: 69 y.o. Sex: male Admit Date: 5/20/2019 LOS: 7 days Subjective: This is a 68 YO male patient with PMH of alcoholic cirrhosis, several episodes of hepatic encephalopathy requiring admission and hepatocellular carcinoma who has been seen at the [facility] transplant center for liver transplant. She was admitted on 5/20 due to severe fluid overload, ascites and low Plt count of 8K. Her platelet count went above 200K the day after he was admitted and remained stable. It was considered a lab error. He has been receiving IV lasix with spironolactone with important reduction of the severe leg edema he had at admission. Had paracentesis on 5/22 with close to 5lts of peritoneal fluid removed. Received 1 U of pRBCs on 5/25 due to anemia with Hgb close to 7. On 5/26 he fell down and suffered abrasions of the both upper extremities. His ammonia was checked and it was up to 88. On 5/27 his ammonia went up to 130. He was already on lactulose. Was started on rifaximin bid. Renal function remains stable. Will recommend to continue IV lasix as long as he is in the hospital and to do a repeated paracentesis in 1 -2 days before he is discharged ( basically he is having weekly paracentesis at this point). Would recommend discharging him once his ammonia level is better. I discussed his case with the [facility] transplant unit on 5/24 . The patient has an appointment with them in June. At discharge he should be provided with a copy of his discharge summary to take it with him to [facility]. Objective:  
 
Vitals:  
 05/27/19 0745 05/27/19 0746 05/27/19 1233 05/27/19 1611 BP: 123/72 123/72 120/70 127/73 Pulse: 82 82 80 83 Resp: 18  18 18 Temp: 97.7 °F (36.5 °C)  98.8 °F (37.1 °C) 98.2 °F (36.8 °C) SpO2: 95% Weight:      
Height:      
  
 
Intake and Output: 
Current Shift: 05/27 0701 - 05/27 1900 In: -  
Out: 825 [Urine:825] Last three shifts: 05/25 1901 - 05/27 0700 In: 977.1 [P.O.:600] Out: 1200 [Urine:1200] Physical Exam:  
GENERAL: alert, appears stated age EYE: conjunctivae/corneas clear. LYMPHATIC: Cervical, supraclavicular, and axillary nodes normal.  
THROAT & NECK: normal and no erythema or exudates noted. LUNG: clear to auscultation bilaterally HEART: regular rate and rhythm, S1, S2 normal, no murmur, click, rub or gallop ABDOMEN: distended. + for ascites, better after paracentesis EXTREMITIES: bilateral leg edema. Improving SKIN: Normal. 
NEUROLOGIC: No focal deficits PSYCHIATRIC: non focal 
 
Lab/Data Review: All lab results for the last 24 hours reviewed. Assessment:  
 
Active Problems: 
  Alcoholic cirrhosis of liver with ascites ([address] (10/8/2018) Esophageal varices without bleeding ([address] (10/8/2018) Essential hypertension (10/30/2018) Hyponatremia (12/28/2018) Pleural effusion, left (1/16/2019) Hyperkalemia (3/26/2019) Decompensated hepatic cirrhosis ([address] (5/20/2019) Plan:  
 
-paracentesis on 5/22. Fluid NOT suggestive of SBP  
-continue IVdiuretics, still has an important edema of the lower extremities but is evidently improving and responding well to IV diuretics. -vein US of the lower extremities was negative for DVT  
-Plt count remains normal  
-1 U of pRBCs transfused on 5/25  
-D dimer ordered by Hematology,  high at admission  but he has an active liver malignancy. NO clinical suspicion for PE. No DVT . Seen by oncology during this admission.  
-moderate protein renan malnutrition: continue ensure high protein, low sodium diet.  
-case discussed with his transplant team at [facility] on 5/24. Please provide patient with a copy of his discharge summary  
-rifaximin added on 5/26,  ammonia is up to 132 
-silver sulfadiazine for his forearm lacerations Signed By: Dorie Hector MD   
 May 27, 2019

## 2019-05-27 NOTE — PROGRESS NOTES
END OF SHIFT NOTE: 
 
INTAKE/OUTPUT 
05/26 0701 - 05/27 0700 In: 600 [P.O.:600] Out: 700 [Urine:700] Voiding: YES Catheter: NO 
Drain:   
 
 
 
 
 
Flatus: Patient does have flatus present. Stool:  0 occurrences. Characteristics: 
Stool Assessment Stool Color: Jackquelyn Capuchin Stool Appearance: Loose Stool Amount: Medium Stool Source/Status: Rectum Emesis: 0 occurrences. Characteristics: VITAL SIGNS Patient Vitals for the past 12 hrs: 
 Temp Pulse Resp BP SpO2  
05/27/19 0323 98.7 °F (37.1 °C) 90 16 116/67 92 % 05/26/19 2352 98 °F (36.7 °C) 83 17 126/72 95 % 05/26/19 2011 98.1 °F (36.7 °C) 81 17 124/75 96 % Pain Assessment Pain Intensity 1: 0 (05/27/19 0323) Pain Location 1: Head 
Pain Intervention(s) 1: Medication (see MAR) Patient Stated Pain Goal: 0 Ambulating Yes Shift report given to oncoming nurse at the bedside.  
 
Jose Prak RN

## 2019-05-28 NOTE — PROGRESS NOTES
Problem: Mobility Impaired (Adult and Pediatric) Goal: *Acute Goals and Plan of Care (Insert Text) Description LTG: 
(1.)Mr. Mattie Farias will move from supine to sit and sit to supine, scoot up and down and roll side to side in bed with INDEPENDENT within 7 day(s). (2.)Mr. Mattie Farias will transfer from bed to chair and chair to bed with INDEPENDENT using the least restrictive device within 7 day(s). (3.)Mr. Mattie Farias will ambulate with modified INDEPENDENCE for 500+ feet with the least restrictive/no device within 7 day(s). (4.)Mr. Mattie Farias will perform standing static and dynamic balance activities x 8 minutes with SUPERVISION to improve safety within 7 day(s). (5.)Mr. Mattie Farias will ascend and descend 3 stairs using one hand rail(s) with SUPERVISION to improve functional mobility and safety within 7 day(s). Outcome: Progressing Towards Goal 
  
PHYSICAL THERAPY: Daily Note 5/28/2019 INPATIENT: PT Visit Days : 2 Payor: [employer] FIRST CHOICE / Plan: [facility] CHOICE / Product Type: Managed Care Medicare /   
  
NAME/AGE/GENDER: Sharyn Kern is a 69 y.o. male PRIMARY DIAGNOSIS: Decompensated hepatic cirrhosis (HCC) [K72.90] <principal problem not specified> 
 <principal problem not specified> 
 
 
  
ICD-10: Treatment Diagnosis:  
 · Other abnormalities of gait and mobility (R26.89) · History of falling (Z91.81) Precaution/Allergies: 
Hydromet [hydrocodone-homatropine] ASSESSMENT:  
Mr. Mattie Farias presents supine in bed with wife at bedside. At baseline he ambulates with quad cane in home and RW in community. He fell recently while trying to bend over and  a remote. Patient transitioned to sit with SBA and additional time and effort. Patient states that he likes to lie in bed, encouraged him to spend more time in the chair. He stood with SBA then ambulated 500' with RW and SBA.   Worked on standing balance activities in hallway such as sidestepping, braiding, heel to toe amb and pt required CG to min A for balance. Patient demonstrated progress with activity tolerance today, but bed mob and transfers more effortful. Needs continued work on balance. Mr. Radha Arias would benefit from skilled physical therapy (medically necessary) to address his deficits and maximize his function. This section established at most recent assessment PROBLEM LIST (Impairments causing functional limitations): 1. Decreased Transfer Abilities 2. Decreased Ambulation Ability/Technique 3. Decreased Balance 4. Decreased Activity Tolerance INTERVENTIONS PLANNED: (Benefits and precautions of physical therapy have been discussed with the patient.) 1. Balance Exercise 2. Bed Mobility 3. Gait Training 4. Therapeutic Activites 5. Therapeutic Exercise/Strengthening 6. Transfer Training 7. education TREATMENT PLAN: Frequency/Duration: 3 times a week for 1 week Rehabilitation Potential For Stated Goals: Good REHAB RECOMMENDATIONS (at time of discharge pending progress):   
Placement: It is my opinion, based on this patient's performance to date, that Mr. Radha Arias may benefit from [address] after discharge due to the functional deficits listed above that are likely to improve with skilled rehabilitation because he/she has multiple medical issues that affect his/her functional mobility in the community. Equipment:  
? None at this time HISTORY:  
History of Present Injury/Illness (Reason for Referral): 
Per MD note, \"Patient 74WM with hx of ETOH abuse (sober since July 2018), [facility] [address] esophageal varcies, ileum ulcer, HTN, alcoholic cirrhosis with ascites and frequent paracentesis, chronic left pleural effusion, chronic cardiac murmur, presents with complaint of increasing abdominal and LE swelling. Reports his lasix \"isn't working anymore\".   
 Pt typically sees [facility] ([facility]) but has not been back since eval for liver transplant started at [facility]. He was diagnosed with [address] by biopsy in April 2019 and follows with [facility]. His most recent paracentesis was 5/13 with 6.5 liters removed\" Past Medical History/Comorbidities:  
Mr. Taran Abreu  has a past medical history of Bronchitis, Cirrhosis of liver with ascites ([address] Hypertension, and Ulcer of ileum. Mr. Taran Abreu  has a past surgical history that includes hx orthopaedic; hx orthopaedic; and hx appendectomy. Social History/Living Environment:  
Home Environment: Apartment # Steps to Enter: 2 Rails to Enter: Yes Hand Rails : Bilateral 
One/Two Story Residence: One story Living Alone: No 
Support Systems: Family member(s), Spouse/Significant Other/Partner Patient Expects to be Discharged to[de-identified] [facility] Current DME Used/Available at Home: Heather Sers, quad, Walker, rolling Prior Level of Function/Work/Activity: 
Lives at home with wife. Uses quad cane in home and RW in community. Mod Ind. Number of Personal Factors/Comorbidities that affect the Plan of Care: 1-2: MODERATE COMPLEXITY EXAMINATION:  
Most Recent Physical Functioning:  
Gross Assessment: 
AROM: Generally decreased, functional 
Strength: Generally decreased, functional 
         
  
Posture: 
Posture (WDL): Exceptions to [facility] Posture Assessment: Forward head, Rounded shoulders Balance: 
Sitting: Intact Standing: Impaired Standing - Static: Fair Standing - Dynamic : Fair Bed Mobility: 
Supine to Sit: Stand-by assistance; Additional time Scooting: Stand-by assistance; Additional time Duration: 25 Minutes Wheelchair Mobility: 
  
Transfers: 
Sit to Stand: Stand-by assistance Stand to Sit: Stand-by assistance Gait: 
  
Base of Support: Center of gravity altered Speed/Aleyda: Slow Gait Abnormalities: Decreased step clearance Distance (ft): 500 Feet (ft) Assistive Device: Walker, rolling;Gait belt Ambulation - Level of Assistance: Stand-by assistance Body Structures Involved: 1. Metabolic Body Functions Affected: 1. Movement Related 2. Metobolic/Endocrine Activities and Participation Affected: 1. Mobility 2. Community, Social and [place] Number of elements that affect the Plan of Care: 1-2: LOW COMPLEXITY CLINICAL PRESENTATION:  
Presentation: Stable and uncomplicated: LOW COMPLEXITY CLINICAL DECISION MAKING:  
[facility] AM-PAC 6 Clicks Basic Mobility Inpatient Short Form How much difficulty does the patient currently have. .. Unable A Lot A Little None 1. Turning over in bed (including adjusting bedclothes, sheets and blankets)? ? 1   ? 2   ? 3   ? 4  
2. Sitting down on and standing up from a chair with arms ( e.g., wheelchair, bedside commode, etc.)   ? 1   ? 2   ? 3   ? 4  
3. Moving from lying on back to sitting on the side of the bed?   ? 1   ? 2   ? 3   ? 4 How much help from another person does the patient currently need. .. Total A Lot A Little None 4. Moving to and from a bed to a chair (including a wheelchair)? ? 1   ? 2   ? 3   ? 4  
5. Need to walk in hospital room? ? 1   ? 2   ? 3   ? 4  
6. Climbing 3-5 steps with a railing? ? 1   ? 2   ? 3   ? 4  
© 2007, Trustees of [facility] [employer], under license to [employer]. All rights reserved Score:  Initial: 21 Most Recent: X (Date: -- ) Interpretation of Tool:  Represents activities that are increasingly more difficult (i.e. Bed mobility, Transfers, Gait). Medical Necessity:    
· Patient is expected to demonstrate progress in strength, balance and functional technique ·  to increase independence with   and improve safety during all functional mobility. · . Reason for Services/Other Comments: 
· Patient continues to require skilled intervention due to decline in functional mobility. · . Use of outcome tool(s) and clinical judgement create a POC that gives a: Clear prediction of patient's progress: LOW COMPLEXITY  
  
 
 
 
TREATMENT:  
 (In addition to Assessment/Re-Assessment sessions the following treatments were rendered) Pre-treatment Symptoms/Complaints:  none. Pain: Initial:  
Pain Intensity 1: 0  Post Session:  0 Therapeutic Activity: (25 Minutes   ):  Therapeutic activities including Bed transfers, Chair transfers, Ambulation on level ground and standing balance activities in hallway to improve mobility, strength and balance. Required minimal   to promote dynamic balance in standing and correct technique with activities. [facility] Therapeutic Exercise: ( 0 minutes):  Exercises per grid below to improve mobility, strength and balance. Required minimal visual and verbal cues to promote proper body alignment. Progressed complexity of movement as indicated. Hands on RW for balance. Date: 5/27/19 Ambulation Device 
assistance Partial Squats Hip Abduction/ Adduction Standing 2x10 Heel Raises Standing x20 Toe Raises Standing x20 Hip Flexion Standing Sit to Stand Key:  R=right, L=left, B=bilaterally, A=active, AA=active assisted, P=passive Braces/Orthotics/Lines/Etc:  
· O2 Device: Room air Treatment/Session Assessment:   
· Response to Treatment:  pleasant and cooperative. · Interdisciplinary Collaboration:  
o Physical Therapist 
o Registered Nurse · After treatment position/precautions:  
o Up in chair 
o Bed alarm/tab alert on 
o Bed/Chair-wheels locked 
o Call light within reach 
o Family at bedside · Compliance with Program/Exercises: Will assess as treatment progresses · Recommendations/Intent for next treatment session: \"Next visit will focus on advancements to more challenging activities and reduction in assistance provided\". Total Treatment Duration: PT Patient Time In/Time Out Time In: 1052 Time Out: 1118 Trevin Hua, PT, DPT

## 2019-05-28 NOTE — PROGRESS NOTES
Nutrition F/U Assessment: visited patient and wife in room. Reviewed previous teaching. No additional questions. Both seem motivated. Since wife is vegetarian, patient usually cooks his own meats and wife states she will not be able to control how he cooks for himself. DIET CARDIAC Regular; 2 GM NA (House Low NA) DIET NUTRITIONAL SUPPLEMENTS Breakfast, Dinner; Ensure High Protein  Chocolate Anthropometrics:Height: 5' 8\" (172.7 cm),  Weight: 100.2 kg (221 lb), unspecified, Body mass index is 33.6 kg/m². BMI class of obesity. BMI and current wt with limited validity secondary ascites. Malnutrition Criteria: ASPEN Malnutrition Criteria Acute Illness, Chronic Illness, or Social/Enviornmental: Chronic illness Body Fat: Mild Muscle Mass: Mild ASPEN Malnutrition Score - Chronic Illness: 2 Chronic Illness - Malnutrition Diagnosis: Moderate malnutrition Macronutrient needs: 82 kg reported wt without fluid EER:  2902-0701 kcal /day (25-30 kcal/kg) EPR:  82-98 grams protein/day (1-1.2 grams/kg) Intake/Comparative Standards: Pt recalls eating % most meals. This will provide at least 75% of kcal needs and 85% of protein needs. Also consuming 1-2 ensure high protein supplements per day which will meet 100% of kcal and protein needs. Nutrition Diagnosis: Moderate chronic disease or condition related malnutrition related to taste alterations, poor appetite, early satiety as evidenced by self report of barriers, recall of highly variable po at home, +muscle and fat loss in setting of increased needs associated with liver disease. Intervention: 
Meals and snacks: Continue current diet. Nutrition supplement therapy: Ensure High Protein BID - chocolate. Nutrition education: Provided patient and wife with verbal instruction on low sodium diet. Emphasis on sodium free seasoning options, importance of adequate kcal and protein intake.   Patient verbalizes good understanding of diet. Expect fair compliance. Discharge Nutrition Plan: No discharge needs at this time. Ashley Hardy, [address] [place] [address], [address]

## 2019-05-28 NOTE — PROGRESS NOTES
Progress Note Patient: Wily Wilder MRN: [id number]  SSN: [id number] YOB: 1945  Age: 69 y.o. Sex: male Admit Date: 5/20/2019 LOS: 8 days Subjective:  
 
alcoholic cirrhosis, several episodes of hepatic encephalopathy requiring admission and hepatocellular carcinoma who has been seen at the [facility] transplant center for liver transplant. Patient usually requires paracentesis almost weekly. He has been receiving IV Lasix and Spironolactone. Legs are less swollen now, but ascites is still tense. Recent paracentesis was on May 22, 2019 with 5 liters of peritoneal fluid removed. He is being treated for hepatic encephapathy as well. Objective:  
 
Vitals:  
 05/27/19 1900 05/27/19 2300 05/28/19 0300 05/28/19 0820 BP: 142/74 142/77 143/77 118/76 Pulse: 80 90 87 85 Resp: 17 18 18 18 Temp: 97.8 °F (36.6 °C) 98 °F (36.7 °C) 98.5 °F (36.9 °C) 97.4 °F (36.3 °C) SpO2: 97% 95% 95% 92% Weight:      
Height:      
  
 
Intake and Output: 
Current Shift: No intake/output data recorded. Last three shifts: 05/26 1901 - 05/28 0700 In: -  
Out: Lorrie [[id number]] Physical Exam:  
 
General:                    The patient is a male in no acute distress. appears lethargic and slow to answer. Generalized superficial vein dilations and hypertrophy of parotid glands. Head:                                   Normocephalic/atraumatic. Eyes:                                   palpebral pallor and mild scleral icterus. ENT:                                    External auricular and nasal exam within normal limits. Mucous membranes are moist. 
Neck:                                   Supple, non-tender, no JVD. Lungs:                       Clear to auscultation bilaterally without wheezes or crackles. Decreased breath sound at bases. No respiratory distress or accessory muscle use. Heart:                                  Regular rate and rhythm, without murmurs, rubs, or gallops. Abdomen:                  Soft, non-tender, very distended with positive for ascites with normoactive bowel sounds. Genitourinary:           No tenderness over the bladder or bilateral CVAs. Extremities:               Without clubbing, cyanosis, 2+ edema with some wrinkles of skin. Skin:                                    Normal color, texture, and turgor. No rashes, lesions, or jaundice. Pulses:                      Radial and dorsalis pedis pulses present 2+ bilaterally. Capillary refill <2s. Neurologic:                CN II-XII grossly intact and symmetrical.  
                                            Moving all four extremities well with no focal deficits. Psychiatric:                Pleasant demeanor, appropriate affect. Lethargic and responded slowly to verbal commands and oriented x 3 Lab/Data Review: 
 
Recent Results (from the past 24 hour(s)) CBC W/O DIFF Collection Time: 05/28/19  6:03 AM  
Result Value Ref Range WBC 8.0 4.3 - 11.1 K/uL  
 RBC 3.43 (L) 4.23 - 5.6 M/uL HGB 9.2 (L) 13.6 - 17.2 g/dL HCT 28.7 (L) 41.1 - 50.3 % MCV 83.7 79.6 - 97.8 FL  
 MCH 26.8 26.1 - 32.9 PG  
 MCHC 32.1 31.4 - 35.0 g/dL  
 RDW 15.9 (H) 11.9 - 14.6 % PLATELET 749 237 - 915 K/uL MPV 10.4 9.4 - 12.3 FL ABSOLUTE NRBC 0.00 0.0 - 0.2 K/uL METABOLIC PANEL, COMPREHENSIVE Collection Time: 05/28/19  6:03 AM  
Result Value Ref Range Sodium 130 (L) 136 - 145 mmol/L Potassium 4.0 3.5 - 5.1 mmol/L Chloride 93 (L) 98 - 107 mmol/L  
 CO2 29 21 - 32 mmol/L Anion gap 8 7 - 16 mmol/L Glucose 102 (H) 65 - 100 mg/dL BUN 13 8 - 23 MG/DL Creatinine 0.94 0.8 - 1.5 MG/DL  
 GFR est AA >60 >60 ml/min/1.73m2 GFR est non-AA >60 >60 ml/min/1.73m2 Calcium 8.2 (L) 8.3 - 10.4 MG/DL  Bilirubin, total 1.9 (H) 0.2 - 1.1 MG/DL  
 ALT (SGPT) 27 12 - 65 U/L  
 AST (SGOT) 45 (H) 15 - 37 U/L Alk. phosphatase 230 (H) 50 - 136 U/L Protein, total 6.2 (L) 6.3 - 8.2 g/dL Albumin 2.6 (L) 3.2 - 4.6 g/dL Globulin 3.6 (H) 2.3 - 3.5 g/dL A-G Ratio 0.7 (L) 1.2 - 3.5 MAGNESIUM Collection Time: 05/28/19  6:03 AM  
Result Value Ref Range Magnesium 2.1 1.8 - 2.4 mg/dL XR chest  
5- IMPRESSION: Larger left pleural effusion Current Facility-Administered Medications:  
  heparin (porcine) injection 5,000 Units, 5,000 Units, SubCUTAneous, Q12H, Jacinta Vyas MD, 5,000 Units at 05/28/19 3588   silver sulfADIAZINE (SILVADENE) 1 % topical cream, , Topical, DAILY, Iraj Lehman MD 
  rifAXIMin Brea Ramon) tablet 550 mg, 550 mg, Oral, BID, Iraj Lehman MD, 550 mg at 05/28/19 0734 
  0.9% sodium chloride infusion 250 mL, 250 mL, IntraVENous, PRN, Iraj Lehman MD 
  [DISCONTINUED] budesonide (PULMICORT) 500 mcg/2 ml nebulizer suspension, 500 mcg, Nebulization, BID RT, 500 mcg at 05/24/19 0740 **AND** albuterol (PROVENTIL VENTOLIN) nebulizer solution 2.5 mg, 2.5 mg, Nebulization, Q6H PRN, Iraj Lehman MD 
  pantoprazole (PROTONIX) tablet 40 mg, 40 mg, Oral, ACB, Keri Vazquez NP, 40 mg at 05/28/19 0556 
  0.9% sodium chloride infusion 250 mL, 250 mL, IntraVENous, PRN, Michel Garveyison C, DO 
  escitalopram oxalate (LEXAPRO) tablet 10 mg, 10 mg, Oral, DAILY, Maria Luisa Garvey C, DO, 10 mg at 05/28/19 7037   ferrous sulfate 300 mg (60 mg iron)/5 mL oral syrup 27 mg, 27 mg, Oral, ACB, Garvey, Maria Luisa C, DO, 27 mg at 05/28/19 9485   furosemide (LASIX) injection 40 mg, 40 mg, IntraVENous, DAILY, Garvey, Maria Luisa C, DO, 40 mg at 05/28/19 0736 
  gabapentin (NEURONTIN) capsule 400 mg, 400 mg, Oral, TID, Garvey, Maria Luisa C, DO, 400 mg at 05/28/19 0734 
  lactulose (CHRONULAC) 10 gram/15 mL solution 15 mL, 10 g, Oral, TID, Garvey, Maria Luisa C, DO, 15 mL at 05/28/19 3186   magnesium oxide (MAG-OX) tablet 400 mg, 400 mg, Oral, DAILY, Garvey, Maria Luisa C, DO, 400 mg at 05/28/19 8458   mirtazapine (REMERON) tablet 15 mg, 15 mg, Oral, QHS, Garvey, Maria Luisa C, DO, 15 mg at 05/27/19 2227   sodium chloride (NS) flush 5-40 mL, 5-40 mL, IntraVENous, Q8H, Garvey, Maria Luisa C, DO, 10 mL at 05/28/19 0557   sodium chloride (NS) flush 5-40 mL, 5-40 mL, IntraVENous, PRN, Garvey, Christa Pink, DO 
  acetaminophen (TYLENOL) tablet 650 mg, 650 mg, Oral, Q4H PRN, Jessica Laughter C, DO, 650 mg at 05/26/19 1206   ondansetron (ZOFRAN) injection 4 mg, 4 mg, IntraVENous, Q4H PRN, Garvey, Maria Luisa C, DO 
  ciprofloxacin HCl (CIPRO) tablet 500 mg, 500 mg, Oral, Q24H, Garvey, Maria Luisa C, DO, 500 mg at 05/27/19 2228   spironolactone (ALDACTONE) tablet 100 mg, 100 mg, Oral, DAILY, Garvey, Maria Luisa C, DO, 100 mg at 05/28/19 6299 Assessment:  
 
Principal Problem: 
  Decompensated hepatic cirrhosis ([facility] [address] (5/20/2019) Active Problems: 
  Alcoholic cirrhosis of liver with ascites ([facility] [address] (10/8/2018) Esophageal varices without bleeding ([facility] [address] (10/8/2018) Essential hypertension (10/30/2018) Hyponatremia (12/28/2018) Pleural effusion, left (1/16/2019) Hyperkalemia (3/26/2019) Plan:  
 
Hepatic cirrhosis with decompensation. Continue IV Lasix, Spironolactone, Rifaximin. Will plan for paracentesis again. Ask IR to help. Hypertension Monitor blood pressure and manage accordingly. Continue current medications. Hyperkalemia Resolved. Hyponatremia Mild. Monitor. Related to cirrhosis and edematous state. I have discussed the plan of care with patient. DVT prophylaxis : heparin SC Signed By: Donavon Bennett MD   
 May 28, 2019

## 2019-05-28 NOTE — WOUND CARE
Bilateral forearms with abrasions and small skin tears, using silvadene per MD order, would continue. These areas will heal well with moist wound healing which any ointment or ointment gauze would provide. Continue daily dressings with silvadene. Will loosely follow inpatient. Should discharge to self care of these area at home, patient verbalized he was capable of continuing to care for these areas at home.

## 2019-05-29 NOTE — DISCHARGE INSTRUCTIONS
Activity: Activity as tolerated  Diet: low salt diet,   Wound Care: Keep wound clean and dry         Patient Education        Cirrhosis: Care Instructions  Your Care Instructions    Cirrhosis occurs when healthy tissue in your liver gets scarred. This keeps the liver from working well. It usually happens after a liver has been inflamed for years. Cirrhosis is most often caused by alcohol abuse or hepatitis infection. But there are other causes too. These include medicines and too much fat in the liver. Conditions passed down in families and other disorders can also cause it. In some cases, no cause can be found. Treatment can't completely fix liver damage. But you may be able to slow or prevent more damage if you don't drink alcohol or use drugs that harm your liver. Follow-up care is a key part of your treatment and safety. Be sure to make and go to all appointments, and call your doctor if you are having problems. It's also a good idea to know your test results and keep a list of the medicines you take. How can you care for yourself at home? · Do not drink any alcohol. It can harm your liver. Talk to your doctor if you need help to stop drinking. · Be safe with medicines. Take your medicines exactly as prescribed. Call your doctor if you think you are having a problem with your medicine. · Talk to your doctor before you take any other medicines. These include over-the-counter medicines and herbal products. · Be careful taking acetaminophen (Tylenol), ibuprofen (Advil, Motrin), or naproxen (Aleve). These can sometimes cause more liver damage. Talk with your doctor if you're not sure which medicines are safe. · If your cirrhosis causes extra fluid to build up in your body, try not to eat a lot of salt. Use less salt when you cook and at the table. Don't eat fast foods or snack foods with a lot of salt. Extra fluid in your belly, legs, and chest can cause serious problems.   · Work with your doctor or a dietitian to be sure you eat the right amount of carbohydrate, protein, fat, and sodium (salt). It's very important to choose the best foods for the health of your liver. · If your doctor recommends it, limit how much fluid you drink. · If your doctor recommends it, get more exercise. Walking is a good choice. Bit by bit, increase the amount you walk every day. Try for at least 30 minutes on most days of the week. You also may want to swim, bike, or do other activities. When should you call for help? Call 911 anytime you think you may need emergency care. For example, call if:    · You have trouble breathing.     · You vomit blood or what looks like coffee grounds.    Call your doctor now or seek immediate medical care if:    · You feel very sleepy or confused.     · You have new belly pain, or your pain gets worse.     · You have a fever.     · There is a new or increasing yellow tint to your skin or the whites of your eyes.     · You have any abnormal bleeding, such as:  ? Nosebleeds. ? Vaginal bleeding that is different (heavier, more frequent, at a different time of the month) than what you are used to.  ? Bloody or black stools, or rectal bleeding. ? Bloody or pink urine.    Watch closely for changes in your health, and be sure to contact your doctor if:    · You have any problems.     · Your belly is getting bigger.     · You are gaining weight. Where can you learn more? Go to http://josie-leonard.info/. Enter M412 in the search box to learn more about \"Cirrhosis: Care Instructions. \"  Current as of: March 27, 2018  Content Version: 11.9  © 1692-9549 [employer]. Care instructions adapted under license by [employer] (which disclaims liability or warranty for this information).  If you have questions about a medical condition or this instruction, always ask your healthcare professional. [address] any warranty or liability for your use of this information. Learning About Paracentesis  What is paracentesis? Paracentesis (say \"kcqd-qm-hnf-SONAM-karen\") is a procedure that removes fluid from the belly. The buildup of fluid may be caused by infection, inflammation, an injury, or other problems. Swelling from too much fluid may cause pain or trouble breathing. The doctor will remove the extra fluid with a needle attached to a tube. Your doctor may remove the fluid to:  · Diagnose infection, injury, or other conditions. · Relieve pressure in your belly. How is the procedure done? Your doctor or nurse will clean the area of your belly where the needle will go in. Then he or she will put sterile towels around the area. You may get a shot of numbing medicine in your belly. Then your doctor will gently insert a needle where the fluid is. He or she may attach a tube (catheter) to the site to help collect the fluid. The procedure may take from a few minutes to 30 minutes or more. After the fluid has drained, your doctor will take out the needle or catheter and put a bandage on the site. What can you expect after the procedure? Your doctor will watch your pulse, blood pressure, and temperature for about an hour. If your doctor thinks that testing the fluid can help find the cause of a problem, he or she will send it to a lab. For up to 2 days after the procedure, you may have a small amount of clear fluid coming out of the site where the needle was inserted, especially if you had a lot of fluid removed. You may need to change the bandage on the site. You can do your normal activities after the procedure, unless your doctor tells you not to. If fluid builds up in your belly again, your doctor may repeat this procedure. When should you call for help? Call 911 anytime you think you may need emergency care. For example, call if:  · You passed out (lost consciousness).   Call your doctor now or seek immediate medical care if:  · You have symptoms of infection, such as:  ? Increased pain, swelling, warmth, or redness. ? Red streaks or pus. ? A fever. · You are dizzy or lightheaded, or you feel like you may faint. · You have new or worse belly pain. Watch closely for changes in your health, and be sure to contact your doctor if:  · Fluid builds up in your belly again. · You do not get better as expected. Where can you learn more? Go to http://josie-leonard.info/. Enter X447 in the search box to learn more about \"Learning About Paracentesis. \"  Current as of: March 27, 2018  Content Version: 11.9  © 7198-0460 [employer] Care instructions adapted under license by [employer] (which disclaims liability or warranty for this information). If you have questions about a medical condition or this instruction, always ask your healthcare professional. [place] [address] any warranty or liability for your use of this information. DISCHARGE SUMMARY from Nurse    PATIENT INSTRUCTIONS:    After general anesthesia or intravenous sedation, for 24 hours or while taking prescription Narcotics:  · Limit your activities  · Do not drive and operate hazardous machinery  · Do not make important personal or business decisions  · Do  not drink alcoholic beverages  · If you have not urinated within 8 hours after discharge, please contact your surgeon on call. Report the following to your surgeon:  · Excessive pain, swelling, redness or odor of or around the surgical area  · Temperature over 100.5  · Nausea and vomiting lasting longer than 4 hours or if unable to take medications  · Any signs of decreased circulation or nerve impairment to extremity: change in color, persistent  numbness, tingling, coldness or increase pain  · Any questions    What to do at Home:  Recommended activity: Activity as tolerated.     If you experience any of the following symptoms:  Fever greater than 100.5,  Increased fluid build up in your abdomen,  Pain or nausea/ vomiting not controlled by your medication,  please follow up with your doctor. *  Please give a list of your current medications to your Primary Care Provider. *  Please update this list whenever your medications are discontinued, doses are      changed, or new medications (including over-the-counter products) are added. *  Please carry medication information at all times in case of emergency situations. These are general instructions for a healthy lifestyle:    No smoking/ No tobacco products/ Avoid exposure to second hand smoke  Surgeon General's Warning:  Quitting smoking now greatly reduces serious risk to your health. Obesity, smoking, and sedentary lifestyle greatly increases your risk for illness    A healthy diet, regular physical exercise & weight monitoring are important for maintaining a healthy lifestyle    You may be retaining fluid if you have a history of heart failure or if you experience any of the following symptoms:  Weight gain of 3 pounds or more overnight or 5 pounds in a week, increased swelling in our hands or feet or shortness of breath while lying flat in bed. Please call your doctor as soon as you notice any of these symptoms; do not wait until your next office visit. Recognize signs and symptoms of STROKE:    F-face looks uneven    A-arms unable to move or move unevenly    S-speech slurred or non-existent    T-time-call 911 as soon as signs and symptoms begin-DO NOT go       Back to bed or wait to see if you get better-TIME IS BRAIN. Warning Signs of HEART ATTACK     Call 911 if you have these symptoms:   Chest discomfort. Most heart attacks involve discomfort in the center of the chest that lasts more than a few minutes, or that goes away and comes back. It can feel like uncomfortable pressure, squeezing, fullness, or pain.  Discomfort in other areas of the upper body.  Symptoms can include pain or discomfort in one or both arms, the back, neck, jaw, or stomach.  Shortness of breath with or without chest discomfort.  Other signs may include breaking out in a cold sweat, nausea, or lightheadedness. Don't wait more than five minutes to call 911 - MINUTES MATTER! Fast action can save your life. Calling 911 is almost always the fastest way to get lifesaving treatment. Emergency Medical Services staff can begin treatment when they arrive -- up to an hour sooner than if someone gets to the hospital by car. The discharge information has been reviewed with the patient. The patient verbalized understanding. Discharge medications reviewed with the patient and appropriate educational materials and side effects teaching were provided.   ___________________________________________________________________________________________________________________________________

## 2019-05-29 NOTE — PROGRESS NOTES
Dressing change to 3 wounds on UE completed. Cleaned wound and applied  the medication, telfa, and kerlix. Pt tolerated well

## 2019-05-29 NOTE — PROGRESS NOTES
TRANSFER - OUT REPORT: 
 
Verbal report given to Salas RN(name) on Blake Baker  being transferred to [facility](unit) for routine progression of care Report consisted of patients Situation, Background, Assessment and  
Recommendations(SBAR). Information from the following report(s) SBAR, Kardex, Procedure Summary, Intake/Output and Accordion was reviewed with the receiving nurse. Lines:  
Peripheral IV 05/25/19 Left;Posterior Forearm (Active) Site Assessment Clean, dry, & intact 5/29/2019  2:15 AM  
Phlebitis Assessment 0 5/29/2019  2:15 AM  
Infiltration Assessment 0 5/29/2019  2:15 AM  
Dressing Status Clean, dry, & intact 5/28/2019  7:50 PM  
Dressing Type Transparent 5/28/2019  7:50 PM  
Hub Color/Line Status Green;Capped 5/28/2019  7:50 PM  
  
 
Opportunity for questions and clarification was provided. Patient transported with: 
 [employer]

## 2019-05-29 NOTE — PROGRESS NOTES
Dispo update:  Faxed [phone]) resumption of care (AGA) orders to [facility] (phone [phone]) for home health RN and PT. Also called [facility] RN George Johnson, [phone] and left detailed message.

## 2019-05-29 NOTE — DISCHARGE SUMMARY
Discharge Summary Patient: Melissa Hernandez MRN: [id number]  SSN: [id number] YOB: 1945  Age: 69 y.o. Sex: male Admit Date: 5/20/2019 Discharge Date: 5/29/2019 Admission Diagnoses: Decompensated hepatic cirrhosis ([place] [address].) [K72.90] Discharge Diagnoses:  
Problem List as of 5/29/2019 Date Reviewed: 4/23/2019 Codes Class Noted - Resolved * (Principal) Decompensated hepatic cirrhosis ([place] [address].) ICD-10-CM: K72.90 ICD-9-CM: 572.8  5/20/2019 - Present Leukocytosis ICD-10-CM: N39.921 ICD-9-CM: 288.60  3/26/2019 - Present SBP (spontaneous bacterial peritonitis) ([place] [address].) ICD-10-CM: R76.0 ICD-9-CM: 567.23  3/26/2019 - Present Nausea & vomiting ICD-10-CM: R11.2 ICD-9-CM: 787.01  3/26/2019 - Present Hyperkalemia ICD-10-CM: E87.5 ICD-9-CM: 276.7  3/26/2019 - Present NATE (acute kidney injury) ([place] [address].) ICD-10-CM: N17.9 ICD-9-CM: 584.9  3/26/2019 - Present Normocytic anemia ICD-10-CM: D64.9 ICD-9-CM: 285.9  3/26/2019 - Present Pleural effusion, left ICD-10-CM: J90 ICD-9-CM: 511.9  1/16/2019 - Present Hyponatremia ICD-10-CM: E87.1 ICD-9-CM: 276.1  12/28/2018 - Present Murmur, cardiac ICD-10-CM: R01.1 ICD-9-CM: 785.2  11/20/2018 - Present Essential hypertension ICD-10-CM: I10 
ICD-9-CM: 401.9  10/30/2018 - Present Alcoholic cirrhosis of liver with ascites ([place] [address].) ICD-10-CM: K70.31 ICD-9-CM: 571.2  10/8/2018 - Present Esophageal varices without bleeding (HCC) ICD-10-CM: I85.00 ICD-9-CM: 456.1  10/8/2018 - Present RESOLVED: Atelectasis ICD-10-CM: J98.11 ICD-9-CM: 518.0  2/5/2019 - 3/26/2019 RESOLVED: Acute respiratory failure ([facility].) ICD-10-CM: J96.00 
ICD-9-CM: 518.81  12/28/2018 - 12/30/2018 RESOLVED: Volume overload ICD-10-CM: E87.70 ICD-9-CM: 276.69  12/28/2018 - 3/26/2019 RESOLVED: Cirrhosis of liver with ascites ([facility].) ICD-10-CM: K74.60, R18.8 ICD-9-CM: 571.5  12/28/2018 - 3/26/2019 RESOLVED: Ascites ICD-10-CM: R18.8 ICD-9-CM: 789.59  12/28/2018 - 3/26/2019 RESOLVED: Precordial pain ICD-10-CM: R07.2 ICD-9-CM: 786.51  11/20/2018 - 3/26/2019 RESOLVED: Cough ICD-10-CM: R05 ICD-9-CM: 786.2  11/20/2018 - 3/26/2019 RESOLVED: Pneumonia of left lower lobe due to infectious organism [facility]) ICD-10-CM: J18.1 ICD-9-CM: 543  10/8/2018 - 3/26/2019 Discharge Condition: Stable Hospital Course:  
 
Patient with PMH of alcoholic cirrhosis, hepatocellular carcinoma, esophageal varices, hypertension, several episodes of hepatic encephalopathy requiring admission and hepatocellular carcinoma who has been seen at the [facility] transplant center for liver transplant. He was admitted on 5- due to severe fluid overload, ascites. He was treated with IV diuretic.  
  
Patient usually requires paracentesis almost weekly. He was treated for encephalopathy with Lactulose and Rifaximin. He underwent paracentesis with removal about 5 L of peritoneal fluid on 5-. He received IV albumin as well. Physical Exam:  
  
General:                    The patient is a male in no acute distress.  appears lethargic and slow to answer. Generalized superficial vein dilations and hypertrophy of parotid glands. RYPG:                                   HEARZTCMCEAXI/EIYFWVUWWU. Eyes:                                   palpebral pallor and mild scleral icterus. ENT:                                    External auricular and nasal exam within normal limits.                                             QCHULT membranes are moist. 
Neck:                                   Supple, non-tender, no JVD. Lungs:                       Clear to auscultation bilaterally without wheezes or crackles. Decreased breath sound at bases.                                             No respiratory distress or accessory muscle use. Heart:                                  Regular rate and rhythm, without murmurs, rubs, or gallops. Abdomen:                  Soft, non-tender, less distended compared to the day prior with positive for ascites with normoactive bowel sounds. Genitourinary:           No tenderness over the bladder or bilateral CVAs. Extremities:               Without clubbing, cyanosis, 2+ edema with some wrinkles of skin. Skin:                                    Normal color, texture, and turgor. No rashes, lesions, or jaundice. Pulses:                      Radial and dorsalis pedis pulses present 2+ bilaterally.  
                                            Capillary refill <2s. Neurologic:                CN II-XII grossly intact and symmetrical.  
                                            Moving all four extremities well with no focal deficits. Psychiatric:                Pleasant demeanor, appropriate affect. Lethargic and responded slowly to verbal commands and oriented x 3 Consults: Gastroenterology, Hematology/Oncology and interventional radiology Significant Diagnostic Studies: XR chest  
5- IMPRESSION: Larger left pleural effusion 
  
 
Recent Results (from the past 24 hour(s)) AMMONIA Collection Time: 05/29/19  4:35 AM  
Result Value Ref Range Ammonia 86 (H) 11 - 32 UMOL/L  
 
METABOLIC PANEL, COMPREHENSIVE [[id number]] (Order [id number]) Lab Date: 5/27/2019 Department: [facility]  Surgical Released By/Authorizing: Kris Dias MD (auto-released) Component Value Flag Ref Range Units Status Sodium 130  Low   136 - 145 mmol/L Final  
Potassium 4.0   3.5 - 5.1 mmol/L Final  
Chloride 93  Low   98 - 107 mmol/L Final  
CO2 29   21 - 32 mmol/L Final  
Anion gap 8   7 - 16 mmol/L Final  
Glucose 102  High   65 - 100 mg/dL Final  
Comment:  
47 - 60 mg/dl Consistent with, but not fully diagnostic of hypoglycemia. 101 - 125 mg/dl Impaired fasting glucose/consistent with pre-diabetes mellitus  
> 126 mg/dl Fasting glucose consistent with overt diabetes mellitus BUN 13   8 - 23 MG/DL Final  
Creatinine 0.94   0.8 - 1.5 MG/DL Final  
GFR est AA >60   >60 ml/min/1.73m2 Final  
GFR est non-AA >60   >60 ml/min/1.73m2 Final  
Comment:  
(NOTE) Estimated GFR is calculated using the Modification of Diet in Renal  
Disease (MDRD) Study equation, reported for both  Americans [facility] [place]) and non- Americans (GFRNA), and normalized to 1.73m2  
body surface area. The physician must decide which value applies to  
the patient. The MDRD study equation should only be used in  
individuals age 25 or older. It has not been validated for the  
following: pregnant women, patients with serious comorbid conditions,  
or on certain medications, or persons with extremes of body size,  
muscle mass, or nutritional status. Calcium 8.2  Low   8.3 - 10.4 MG/DL Final  
Bilirubin, total 1.9  High   0.2 - 1.1 MG/DL Final  
ALT (SGPT) 27   12 - 65 U/L Final  
AST (SGOT) 45  High   15 - 37 U/L Final  
Alk. phosphatase 230  High   50 - 136 U/L Final  
Protein, total 6.2  Low   6.3 - 8.2 g/dL Final  
Albumin 2.6  Low   3.2 - 4.6 g/dL Final  
Globulin 3.6  High   2.3 - 3.5 g/dL Final  
A-G Ratio 0.7  Low   1.2 - 3.5   Final  
 
CBC W/O DIFF [[id number]] (Order [id number]) Lab Date: 5/27/2019 Department: [facility] Surgical Released By/Authorizing: Spring Piedra MD (auto-released) Component Value Flag Ref Range Units Status WBC 8.0   4.3 - 11.1 K/uL Final  
RBC 3.43  Low   4.23 - 5.6 M/uL Final  
HGB 9.2  Low   13.6 - 17.2 g/dL Final  
HCT 28.7  Low   41.1 - 50.3 % Final  
MCV 83.7   79.6 - 97.8 FL Final  
MCH 26.8   26.1 - 32.9 PG Final  
MCHC 32.1   31.4 - 35.0 g/dL Final  
RDW 15.9  High   11.9 - 14.6 % Final  
PLATELET 020   843 - 450 K/uL Final  
MPV 10.4   9.4 - 12.3 FL Final  
ABSOLUTE NRBC 0.00   0.0 - 0.2 K/uL Final  
 
 
 
 Disposition: home Discharge Medications:  
Current Discharge Medication List  
  
START taking these medications Details  
rifAXIMin (XIFAXAN) 550 mg tablet Take 1 Tab by mouth two (2) times a day for 10 days. Qty: 20 Tab, Refills: 0 CONTINUE these medications which have NOT CHANGED Details  
gabapentin (NEURONTIN) 400 mg capsule Take 1 Cap by mouth three (3) times daily. Qty: 90 Cap, Refills: 3  
  
escitalopram oxalate (LEXAPRO) 10 mg tablet Take 1 Tab by mouth daily. Qty: 30 Tab, Refills: 3  
  
spironolactone (ALDACTONE) 50 mg tablet Take 50 mg by mouth daily. furosemide (LASIX) 20 mg tablet Take 20 mg by mouth daily. budesonide-formoterol (SYMBICORT) 160-4.5 mcg/actuation HFAA Take 2 Puffs by inhalation two (2) times a day. Qty: 1 Inhaler, Refills: 3  
  
magnesium oxide (MAG-OX) 400 mg tablet Take 1 Tab by mouth daily. Indications: low amount of magnesium in the blood Qty: 30 Tab, Refills: 2  
  
ciprofloxacin HCl (CIPRO) 500 mg tablet Take  by mouth two (2) times a day. lactulose (GENERLAC) 10 gram/15 mL solution Take  by mouth three (3) times daily. mirtazapine (REMERON) 15 mg tablet Take 1 Tab by mouth nightly. Qty: 30 Tab, Refills: 1  
  
ferrous sulfate (IRON) 325 mg (65 mg iron) tablet Take 27 mg by mouth Daily (before breakfast). pantoprazole (PROTONIX) 40 mg tablet Take 40 mg by mouth daily. STOP taking these medications  
  
 ibuprofen (MOTRIN) 200 mg tablet Comments:  
Reason for Stopping:   
   
  
 
 
Activity: Activity as tolerated Diet: low salt diet, Wound Care: Keep wound clean and dry Follow-up Appointments Procedures  FOLLOW UP VISIT Appointment in: 3 - 5 Days Please make follow up appointment once discharged Please make follow up appointment once discharged Standing Status:   Standing Number of Occurrences:   1 Order Specific Question:   Appointment in Answer:   3 - 5 Days  FOLLOW UP VISIT Appointment in: Other (Specify) See your primary doctor in 3-5 days and your transplant clinic as per the appointment. See your primary doctor in 3-5 days and your transplant clinic as per the appointment. Standing Status:   Standing Number of Occurrences:   1 Standing Expiration Date:   5/30/2019 Order Specific Question:   Appointment in Answer: Other (Specify) I have discussed the plan of care with patient and wife at bedside. Time spent on discharge is 40 minutes. Signed By: Amalia Burns MD   
 May 29, 2019

## 2019-05-30 NOTE — PROGRESS NOTES
This note will not be viewable in [address] Date/Time of Call: 05/30/19 1039am  
What was the patient hospitalized for? Decompensated Hepatic Cirrhosis Consent for ARCENIO PEREZ Call Does the patient understand his/her diagnosis and/or treatment and what happened during the hospitalization? Called and spoke with patient. He states that he is doing okay Yes Did the patient receive discharge instructions? Yes  
CM Assessed Risk for Readmission:  
 
 
Patient stated Risk for Readmission:  Patient is a moderate risk for readmission due to diagnoses and/or comorbidities No concerns voiced at this time Review any discharge instructions (see discharge instructions/AVS in [facility]). Ask patient if they understand these. Do they have any questions? reviewed DC instructions Were home services ordered (nursing, PT, OT, ST, etc.)? Yes If so, has the first visit occurred? If not, why? (Assist with coordination of services if necessary.) Patient was currently active with [facility] Was any DME ordered? No   
If so, has it been received? If not, why?  (Assist patient in obtaining DME orders &/or equipment if necessary. ) NA Complete a review of all medications (new, continued and discontinued meds per the D/C instructions and medication tab in [facility] [address] [facility]). Medications reviewed START taking: 
rifAXIMin 550 mg tablet Margruby Farias) STOP taking: 
ibuprofen 200 mg tablet (MOTRIN) Were all new prescriptions filled? If not, why?  (Assist patient in obtaining medications if necessary  escalate for CCM &/or SW if ongoing issues are verbalized by pt or anticipated) Yes Does the patient understand the purpose and dosing instructions for all medications? (If patient has questions, provide explanation and education.) Patient verbalizes understanding of medications Does the patient have any problems in performing ADLs? (If patient is unable to perform ADLs  what is the limiting factor(s)? Do they have a support system that can assist? If no support system is present, discuss possible assistance that they may be able to obtain. Escalate for CCM/SW if ongoing issues are verbalized by pt or anticipated) Patient somewhat independent with ADLs; family assists PRN Does the patient have all follow-up appointments scheduled? 7 day f/up with PCP?  
(f/up with PCP may be w/in 14 days if patient has a f/up with their specialist w/in 7 days) 7-14 day f/up with specialist?  
(or per discharge instructions) If f/up has not been made  what actions has the care coordinator made to accomplish this? Has transportation been arranged? No, patient states that he and his daughter will call and schedule FU appointments. Offered assistance. Patient declined. Explained importance of FUs. He verbalizes understanding and agrees to contact Care Coordinator if they have issues scheduling. Yes, no concerns Any other questions or concerns expressed by the patient? No questions or concerns are voiced at this time. Care Coordinator contact information provided should any needs arise. Schedule next appointment with ARCENIO Cross or refer to RN Case Manager/ per the workflow guidelines. When is care coordinators next follow-up call scheduled? If referred for CCM  what RN care manager was the referral assigned? Within 30 days Within 30 days NA  
AILIN Call Completed By: Angela Villarreal CMA Care Coordinator

## 2019-06-26 NOTE — DISCHARGE INSTRUCTIONS
[address]  Department of Interventional Radiology  [facility]  [phone] Office  [phone] Fax    PARACENTESIS DISCHARGE INSTRUCTIONS    General Information:  During this procedure, the doctor will insert a needle into the abdomen to drain fluid. After the procedure, you will be able to take a deep breath much easier. The site of the puncture may ooze the first day. This will decrease and eventually stop. Paracentesis (draining fluid from the abdomen) sometimes makes patients hypotensive (low blood pressure). Your doctor may order for you to receive fluids or albumin (a volume booster) during the procedure through an IV site. Home Care Instructions:  Keep the puncture site clean and dry. No tub baths or swimming until puncture site heals. Showering is acceptable. Resume your normal diet, and resume your normal activity slowly and as you tolerate. If you are short of breath, rest. If shortness of breath does not ease, please call your ordering doctor. Fluid can re-accumulate in the chest and/or in the abdomen. If this should occur, your doctor needs to know as you may need to have the procedure done again. Call If:     You should call your Physician and/or the Radiology Nurse if you notice any signs of infection, like pus draining, or if it is swollen or reddened. Also call if you have a fever, or if you are bleeding from the puncture site more than a small amount on the dressing. Call if the puncture site keeps draining fluid. Some oozing is to be expected, but should slow and then stop. Call if you feel like you have pressure in your abdomen. SEEK IMMEDIATE CARE OR CALL 911 IF YOU SUDDENLY HAVE TROUBLE BREATHING, OR IF YOUR LIPS TURN BLUE, OR IF YOU NOTICE BLOOD IN YOUR SPUTUM. Follow-Up Instructions: Please see your ordering doctor as he/she has requested. To Reach Us:   If you have any questions about your procedure, please call the Interventional Radiology department at [phone]. After business hours (5pm) and weekends, call the answering service at [phone] and ask for the Radiologist on call to be paged. Interventional Radiology General Nurse Discharge    After general anesthesia or intravenous sedation, for 24 hours or while taking prescription Narcotics:  · Limit your activities  · Do not drive and operate hazardous machinery  · Do not make important personal or business decisions  · Do  not drink alcoholic beverages  · If you have not urinated within 8 hours after discharge, please contact your surgeon on call. * Please give a list of your current medications to your Primary Care Provider. * Please update this list whenever your medications are discontinued, doses are     changed, or new medications (including over-the-counter products) are added. * Please carry medication information at all times in case of emergency situations. These are general instructions for a healthy lifestyle:    No smoking/ No tobacco products/ Avoid exposure to second hand smoke  Surgeon General's Warning:  Quitting smoking now greatly reduces serious risk to your health. Obesity, smoking, and sedentary lifestyle greatly increases your risk for illness  A healthy diet, regular physical exercise & weight monitoring are important for maintaining a healthy lifestyle    You may be retaining fluid if you have a history of heart failure or if you experience any of the following symptoms:  Weight gain of 3 pounds or more overnight or 5 pounds in a week, increased swelling in our hands or feet or shortness of breath while lying flat in bed. Please call your doctor as soon as you notice any of these symptoms; do not wait until your next office visit.     Recognize signs and symptoms of STROKE:  F-face looks uneven    A-arms unable to move or move unevenly    S-speech slurred or non-existent    T-time-call 911 as soon as signs and symptoms begin-DO NOT go       Back to bed or wait to see if you get better-TIME IS BRAIN.         Date: 6/26/2019  Discharging Nurse: Boris Jauregui RN

## 2019-06-26 NOTE — PROGRESS NOTES
Interventional Radiology Post Paracentesis/Thoracentesis Note    6/26/2019    Procedure(s): Ultrasound guided Therapeutic Paracentesis Performed with 8 Polish catheter total volume 5200 ml. Preliminary Findings: large cloudy and orange/tan. Complications: None    Plan:  Observation, check labs if drawn.           Chest X-Ray:  no    Full dictated report to follow

## 2019-07-10 NOTE — DISCHARGE INSTRUCTIONS
[address]  Department of Interventional Radiology  [facility]  [phone] Office  [phone] Fax    PARACENTESIS DISCHARGE INSTRUCTIONS    General Information:  During this procedure, the doctor will insert a needle into the abdomen to drain fluid. After the procedure, you will be able to take a deep breath much easier. The site of the puncture may ooze the first day. This will decrease and eventually stop. Paracentesis (draining fluid from the abdomen) sometimes makes patients hypotensive (low blood pressure). Your doctor may order for you to receive fluids or albumin (a volume booster) during the procedure through an IV site. Home Care Instructions:  Keep the puncture site clean and dry. No tub baths or swimming until puncture site heals. Showering is acceptable. Resume your normal diet, and resume your normal activity slowly and as you tolerate. If you are short of breath, rest. If shortness of breath does not ease, please call your ordering doctor. Fluid can re-accumulate in the chest and/or in the abdomen. If this should occur, your doctor needs to know as you may need to have the procedure done again. Call If:     You should call your Physician and/or the Radiology Nurse if you notice any signs of infection, like pus draining, or if it is swollen or reddened. Also call if you have a fever, or if you are bleeding from the puncture site more than a small amount on the dressing. Call if the puncture site keeps draining fluid. Some oozing is to be expected, but should slow and then stop. Call if you feel like you have pressure in your abdomen. SEEK IMMEDIATE CARE OR CALL 911 IF YOU SUDDENLY HAVE TROUBLE BREATHING, OR IF YOUR LIPS TURN BLUE, OR IF YOU NOTICE BLOOD IN YOUR SPUTUM. Follow-Up Instructions: Please see your ordering doctor as he/she has requested. To Reach Us:     If you have any questions about your procedure, please call the Interventional Radiology department at [phone]. After business hours (5pm) and weekends, call the answering service at [phone] and ask for the Radiologist on call to be paged. Si tiene Preguntas acerca del procedimiento, por favor llame al departamento de Radiología Intervencional al [phone]. Después de horas de oficina (5 pm) y los fines de [place], llamar al [facility] al [phone] y pregunte por el Radiologo de [facility].        Date: 7/10/2019  Discharging Nurse: Taran Petit RN

## 2019-09-10 NOTE — PROCEDURES
Department of Interventional Radiology  [phone]        Interventional Radiology Brief Procedure Note    Patient: Poonam Redd MRN: [id number]  SSN: [id number]    YOB: 1945  Age: 69 y.o. Sex: male      Date of Procedure: 9/10/2019    Pre-Procedure Diagnosis: alcoholic cirrhosis, abdominal distention, frequent falls, concern for Pleurx catheter malfunction/dislodged. Hospice care    Post-Procedure Diagnosis: SAME    Procedure(s): abdominal drainage performed through existing catheter, 100 ml blood tinged ascites removed. Dressing reapplied. Pt transported to [address] [place] [address],[address]. Brief Description of Procedure: abdominal drainage performed through existing catheter, 100 ml blood tinged ascites removed. Dressing reapplied    Performed By: Artis Avalos PA-C     Assistants: None    Anesthesia:None    Estimated Blood Loss: None    Specimens:  None    Implants:  None    Findings: ltd abd us demonstrates very small volume ascites, mostly loculated, with only fluid pocket being epigastrium. ++ anasarca. Complications: None    Recommendations: drain weekly. Likely won't get more than a few hundred mls.       Follow Up: prn    Signed By: Artis Avalos PA-C     September 10, 2019

## 2019-09-10 NOTE — DISCHARGE INSTRUCTIONS
Your catheter was in the correct position    The increased swelling of your abdomen is from fluid in the skin -- edema, like the swelling in your legs    Increase your lasix to 80 mg daily for 5 days. Follow up with your doctor    Recent Results (from the past 8 hour(s))   CBC W/O DIFF    Collection Time: 09/10/19 10:19 AM   Result Value Ref Range    WBC 9.8 4.3 - 11.1 K/uL    RBC 3.08 (L) 4.23 - 5.6 M/uL    HGB 8.2 (L) 13.6 - 17.2 g/dL    HCT 25.9 (L) 41.1 - 50.3 %    MCV 84.1 79.6 - 97.8 FL    MCH 26.6 26.1 - 32.9 PG    MCHC 31.7 31.4 - 35.0 g/dL    RDW 19.9 (H) 11.9 - 14.6 %    PLATELET 457 811 - 908 K/uL    MPV 9.9 9.4 - 12.3 FL    ABSOLUTE NRBC 0.00 0.0 - 0.2 K/uL   METABOLIC PANEL, COMPREHENSIVE    Collection Time: 09/10/19 10:19 AM   Result Value Ref Range    Sodium 127 (L) 136 - 145 mmol/L    Potassium 4.9 3.5 - 5.1 mmol/L    Chloride 94 (L) 98 - 107 mmol/L    CO2 26 21 - 32 mmol/L    Anion gap 7 7 - 16 mmol/L    Glucose 101 (H) 65 - 100 mg/dL    BUN 22 8 - 23 MG/DL    Creatinine 1.52 (H) 0.8 - 1.5 MG/DL    GFR est AA 58 (L) >60 ml/min/1.73m2    GFR est non-AA 48 (L) >60 ml/min/1.73m2    Calcium 8.3 8.3 - 10.4 MG/DL    Bilirubin, total 2.0 (H) 0.2 - 1.1 MG/DL    ALT (SGPT) 34 12 - 65 U/L    AST (SGOT) 71 (H) 15 - 37 U/L    Alk. phosphatase 472 (H) 50 - 136 U/L    Protein, total 6.4 6.3 - 8.2 g/dL    Albumin 1.6 (L) 3.2 - 4.6 g/dL    Globulin 4.8 (H) 2.3 - 3.5 g/dL    A-G Ratio 0.3 (L) 1.2 - 3.5     PROTHROMBIN TIME + INR    Collection Time: 09/10/19 10:19 AM   Result Value Ref Range    Prothrombin time 17.5 (H) 11.7 - 14.5 sec    INR 1.4       Xr Chest Pa Lat    Result Date: 9/10/2019  PA AND LATERAL CHEST X-RAY. Clinical Indication: Wheezing, shortness of breath, cirrhosis Comparison: Chest x-ray dated 5/20/2019 Findings: 2 views of the chest submitted demonstrate a new, moderate size right pleural effusion with a stable left pleural effusion. Note the left pleural effusion has a loculated appearance. The cardiac silhouette and mediastinum are stable. IMPRESSION: 1. New moderate size right pleural effusion. 2. Stable, likely loculated left pleural effusion. Xr Abd (kub)    Result Date: 9/10/2019  KUB CLINICAL INDICATION: Cirrhosis, Pleurx catheter FINDINGS: A catheter is appreciated in the left lower abdominal quadrant. The bowel gas pattern is unremarkable and nonspecific. The bilateral pleural effusions are again noted. IMPRESSION: 1. Pleurx catheter in the left lower abdominal quadrant 2. Bilateral pleural effusions    Us Abd Ltd    Result Date: 9/10/2019  Abdominal ultrasound, limited CLINICAL INDICATION: Evaluate Pleurx drainage catheter PROCEDURE: Realtime grayscale sonographic evaluation of the abdomen. COMPARISON: No prior. FINDINGS: A small amount of ascites is present. The Pleurx catheter is noted in the left lower quadrant. IMPRESSION: Small volume ascites with a Pleurx catheter in the left lower abdominal quadrant.

## 2019-09-10 NOTE — ED TRIAGE NOTES
Family reports patient has had several falls and is concerned that the pleurx drain is displaced. States that it is not draining as it was and the color of the drainage is different.

## 2019-09-10 NOTE — ED NOTES
I have reviewed discharge instructions with the patient. The patient verbalized understanding. Patient left ED via Discharge Method: wheelchair to Home with family    Opportunity for questions and clarification provided. Patient given 0 scripts. To continue your aftercare when you leave the hospital, you may receive an automated call from our care team to check in on how you are doing. This is a free service and part of our promise to provide the best care and service to meet your aftercare needs.  If you have questions, or wish to unsubscribe from this service please call [phone]. Thank you for Choosing our [facility].

## 2019-09-22 NOTE — DISCHARGE INSTRUCTIONS
Increase Lasix to twice daily for the next several days. Elevate legs. Return for worsening or concerning symptoms.

## 2019-09-22 NOTE — ED NOTES
I have reviewed discharge instructions with the patient. The patient verbalized understanding. Patient left ED via Discharge Method: wheelchair to Home with wife and daughter. Opportunity for questions and clarification provided. Patient given 0 scripts. To continue your aftercare when you leave the hospital, you may receive an automated call from our care team to check in on how you are doing. This is a free service and part of our promise to provide the best care and service to meet your aftercare needs.  If you have questions, or wish to unsubscribe from this service please call [phone]. Thank you for Choosing our [facility] Emergency Department.

## 2019-09-22 NOTE — ED PROVIDER NOTES
74-year-old male with history of liver cancer and cirrhosis presents with increased abdominal swelling, left arm swelling, and bilateral leg swelling over the past 3 weeks. He has had increasing falls. Family states his Pleurx catheter stopped draining since his last large fall 3 weeks ago. He was seen in the emergency department 12 days ago for the same. Pleurx catheter was evaluated by interventional radiology with ultrasound and found to be draining properly. Patient only had a small volume ascites at that time. Swelling was thought to be more related to  subcutaneous edema. He has been compliant with Lasix and Aldactone. Family states that despite increased recent doses of lactulose, he has been constipated and straining to have a bowel movement. He has continued left wrist pain since his fall. Denies any history of asymmetric left arm swelling in the past.  No fevers or chills. No confusion. He has been urinating well, but urine has been dark. Swelling Associated symptoms include constipation. Pertinent negatives include no fever, no diarrhea, no nausea, no vomiting, no headaches, no chest pain and no back pain. Past Medical History:  
Diagnosis Date  Bronchitis  Cirrhosis of liver with ascites ([address]  Hypertension  Ulcer of ileum   
 ulcer Past Surgical History:  
Procedure Laterality Date  HX APPENDECTOMY  HX ORTHOPAEDIC    
 carpal tunnel  HX ORTHOPAEDIC    
 back surgery  IR PARACENTESIS ABD W IMAGE  5/29/2019  IR PARACENTESIS ABD W IMAGE  6/14/2019  IR PARACENTESIS ABD W IMAGE  6/26/2019  IR PARACENTESIS ABD W IMAGE  7/10/2019 Family History:  
Problem Relation Age of Onset  COPD Mother  Heart Attack Father 52 Social History Socioeconomic History  Marital status:  Spouse name: Not on file  Number of children: Not on file  Years of education: Not on file  Highest education level: Not on file Occupational History  Not on file Social Needs  Financial resource strain: Not on file  Food insecurity:  
  Worry: Not on file Inability: Not on file  Transportation needs:  
  Medical: Not on file Non-medical: Not on file Tobacco Use  Smoking status: Never Smoker  Smokeless tobacco: Never Used Substance and Sexual Activity  Alcohol use: No  
  Comment: None since 7/2018  Drug use: No  
 Sexual activity: Not on file Lifestyle  Physical activity:  
  Days per week: Not on file Minutes per session: Not on file  Stress: Not on file Relationships  Social connections:  
  Talks on phone: Not on file Gets together: Not on file Attends Gnosticism service: Not on file Active member of club or organization: Not on file Attends meetings of clubs or organizations: Not on file Relationship status: Not on file  Intimate partner violence:  
  Fear of current or ex partner: Not on file Emotionally abused: Not on file Physically abused: Not on file Forced sexual activity: Not on file Other Topics Concern  Not on file Social History Narrative  and lives with wife. Has lived in [place] and [place]. Has one dog. Retired. Previously worked as . ALLERGIES: Hydromet [hydrocodone-homatropine] Review of Systems Constitutional: Positive for fatigue. Negative for chills and fever. HENT: Negative for hearing loss. Eyes: Negative for visual disturbance. Respiratory: Negative for cough and shortness of breath. Cardiovascular: Positive for leg swelling. Negative for chest pain and palpitations. Gastrointestinal: Positive for abdominal distention, abdominal pain and constipation. Negative for diarrhea, nausea and vomiting. Genitourinary: Negative for difficulty urinating. Musculoskeletal: Negative for back pain. Skin: Negative for rash. Neurological: Negative for weakness and headaches. Psychiatric/Behavioral: Negative for confusion. Vitals:  
 09/22/19 0934 09/22/19 1114 BP: 115/70 119/59 Pulse: 100 95 Resp: 17 Temp: 98.3 °F (36.8 °C) SpO2: 99% 96% Weight: 99.8 kg (220 lb) Height: 5' 8\" (1.727 m) Physical Exam  
Constitutional: He appears well-developed and well-nourished. Anasarca HENT:  
Head: Normocephalic and atraumatic. Eyes: Pupils are equal, round, and reactive to light. EOM are normal.  
Neck: Normal range of motion. Neck supple. Cardiovascular: Regular rhythm and normal heart sounds. Pulmonary/Chest: Effort normal and breath sounds normal.  
Abdominal: Soft. He exhibits distension. There is tenderness. Musculoskeletal: He exhibits edema and tenderness. Left wrist: He exhibits decreased range of motion, tenderness and swelling. Left hand: He exhibits swelling. Normal sensation noted. Normal strength noted. Right lower leg: He exhibits edema. Left lower leg: He exhibits edema. Neurological: He is alert. Skin: Skin is warm and dry. There is pallor. Psychiatric: His behavior is normal.  
Nursing note and vitals reviewed. MDM Number of Diagnoses or Management Options Diagnosis management comments: Parts of this document were created using dragon voice recognition software. The chart has been reviewed but errors may still be present. 1:16 PM 
Discussed CT findings of portal vein thrombosis/stenosis with GI, Dr. Annabel Vargas. Dr. Dr Annabel Vargas states anticoagulation is NOT recommended in this patient with obvious varices on CT. Patient already on hospice. Family expressed concern as they want him off of hospice to be admitted to see further specialists and have further treatments performed. He was previously seen by [facility].  Discussed that GI did not believe further treatments are possible and admission would not provide further benefit. Labs are currently stable near baseline. Advised to increase Lasix to twice daily over the next several days and continue evaluation by hospice physician. Patient has been urinating significantly after IV Lasix today. I discussed the results of all labs, procedures, radiographs, and treatments with the patient and available family. Treatment plan is agreed upon and the patient is ready for discharge. Questions about treatment in the ED and differential diagnosis of presenting condition were answered. Patient was given verbal discharge instructions including, but not limited to, importance of returning to the emergency department for any concern of worsening or continued symptoms. Instructions were given to follow up with a primary care provider or specialist within 1-2 days. Adverse effects of medications, if prescribed, were discussed and patient was advised to refrain from significant physical activity until followed up by primary care physician and to not drive or operate heavy machinery after taking any sedating substances. Amount and/or Complexity of Data Reviewed Clinical lab tests: ordered and reviewed (Results for orders placed or performed during the hospital encounter of 09/22/19 
-CBC WITH AUTOMATED DIFF Result                      Value             Ref Range WBC                         7.6               4.3 - 11.1 K* 
     RBC                         2.88 (L)          4.23 - 5.6 M* HGB                         7.7 (L)           13.6 - 17.2 * HCT                         24.7 (L)          41.1 - 50.3 % MCV                         85.8              79.6 - 97.8 * MCH                         26.7              26.1 - 32.9 * MCHC                        31.2 (L)          31.4 - 35.0 * RDW                         19.9 (H)          11.9 - 14.6 % PLATELET                    214               150 - 450 K/* MPV                         10.1              9.4 - 12.3 FL ABSOLUTE NRBC               0.00              0.0 - 0.2 K/* DF                          AUTOMATED NEUTROPHILS                 67                43 - 78 % LYMPHOCYTES                 11 (L)            13 - 44 % MONOCYTES                   18 (H)            4.0 - 12.0 % EOSINOPHILS                 2                 0.5 - 7.8 % BASOPHILS                   0                 0.0 - 2.0 % IMMATURE GRANULOCYTES       1                 0.0 - 5.0 %   
     ABS. NEUTROPHILS            5.1               1.7 - 8.2 K/* ABS. LYMPHOCYTES            0.8               0.5 - 4.6 K/* ABS. MONOCYTES              1.4 (H)           0.1 - 1.3 K/* ABS. EOSINOPHILS            0.2               0.0 - 0.8 K/* ABS. BASOPHILS              0.0               0.0 - 0.2 K/* ABS. IMM. GRANS.            0.1               0.0 - 0.5 K/* 
-METABOLIC PANEL, COMPREHENSIVE Result                      Value             Ref Range Sodium                      134 (L)           136 - 145 mm* Potassium                   4.1               3.5 - 5.1 mm* Chloride                    98                98 - 107 mmo* CO2                         30                21 - 32 mmol* Anion gap                   6 (L)             7 - 16 mmol/L Glucose                     187 (H)           65 - 100 mg/* BUN                         15                8 - 23 MG/DL Creatinine                  1.29              0.8 - 1.5 MG* 
     GFR est AA                  >60               >60 ml/min/1* GFR est non-AA              58 (L)            >60 ml/min/1* Calcium                     8.2 (L)           8.3 - 10.4 M* Bilirubin, total            1.9 (H)           0.2 - 1.1 MG*      ALT (SGPT)                  40                12 - 65 U/L   
 AST (SGOT)                  65 (H)            15 - 37 U/L Alk. phosphatase            407 (H)           50 - 136 U/L Protein, total              6.4               6.3 - 8.2 g/* Albumin                     1.6 (L)           3.2 - 4.6 g/* Globulin                    4.8 (H)           2.3 - 3.5 g/* A-G Ratio                   0.3 (L)           1.2 - 3.5     
-BNP Result                      Value             Ref Range BNP                         65 (H)            0 pg/mL       
-AMMONIA Result                      Value             Ref Range Ammonia                     19                11 - 32 UMOL* 
) Tests in the radiology section of CPT®: ordered and reviewed (Xr Wrist Lt Ap/lat/obl Min 3v Result Date: 9/22/2019 LEFT WRIST 3 view(s). HISTORY: Wrist pain without mention of acute injury. TECHNIQUE: AP and lateral and oblique views. COMPARISON: None. FINDINGS: No acute fracture, subluxation or dislocation. Moderate osteoarthritis at the first carpometacarpal joint. There are subchondral cysts and endplate sclerosis and joint space narrowing. No periostitis or erosions. Bone mineral density borderline. IMPRESSION: Moderate osteoarthritis first carpometacarpal joint. [facility] Ct Abd Pelv W Cont Result Date: 9/22/2019 EXAM: CT of the abdomen and pelvis with contrast. Indication: Abdominal pain and distention. Comparison: Abdomen CT dated March 26, 2019. Multiple axial images were obtained through the abdomen and pelvis after intravenous injection of 100mL of Isovue 370. Radiation dose reduction techniques were used for this study: All CT scans performed at this facility use one or all of the following: Automated exposure control, adjustment of the mA and/or kVp according to patient's size, iterative reconstruction. FINDINGS: LOWER THORAX: Interval development of a moderate right pleural effusion and unchanged moderate left pleural effusion. Interval development of an inferior lingular groundglass peribronchovascular airspace opacity. Advanced atherosclerosis of the coronary arteries. Atelectatic change of the lower lobes. LIVER: Findings of macronodular cirrhosis without a suspicious hepatic lesion evident. Recanalization of the periumbilical vein as well as gastric and esophageal varices. Near complete thrombosis of the portal vein. BILIARY SYSTEM: Contracted gallbladder containing stones. Extrahepatic biliary duct dilatation. PANCREAS: No pancreatic mass. No pancreatic duct dilation. SPLEEN: Splenomegaly again seen. ADRENALS: No adrenal nodule or adrenal hypertrophy. URINARY SYSTEM: Large interpolar left renal cyst as well as an interpolar exophytic intermediate density lesion measuring 18 mm. No hydronephrosis or nephrolithiasis. Bladder is unremarkable. FLUID:  Small volume of intra-abdominal and intrapelvic ascites with a Pleurx catheter evident terminating in the left midabdomen. REPRODUCTIVE ORGANS: Unremarkable. BOWEL: Moderate stool burden within the colon. Postsurgical change of appendectomy. Diffuse fold thickening of the small bowel favored related to patient's fluid status. VASCULAR/NODES: Oral and findings as above. No evidence of abdominal aortic aneurysm. Prominent retroperitoneal lymph nodes without pathologic adenopathy. BONES/SUBCUTANEOUS TISSUE: Diffuse soft tissue anasarca. Mild bilateral gynecomastia. 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 IMPRESSION: 1. Morphologic evidence of cirrhosis with portal hypertension with perigastric and paraesophageal varices, splenomegaly, and recanalization of the periumbilical vein. There is high-grade stenosis/thrombosis of the portal vein. 2. New moderate size right pleural effusion and unchanged moderate left pleural effusion.  New inferior lingular groundglass opacity likely infectious or inflammatory in etiology. 3. Small amount of abdominal ascites with a Pleurx catheter evident. 4. Left interpolar exophytic hyperdense renal lesion which is incompletely characterized. 5. Diffuse fold thickening of the small bowel favored related to patient's fluid status. Diffuse anasarca. Duplex Upper Ext Venous Left Result Date: 9/22/2019 LEFT UPPER EXTREMITY VENOUS DUPLEX ULTRASOUND. HISTORY: Left upper vertex during the swelling and discoloration. TECHNIQUE: Direct skin-contact high resolution grayscale images, color-flow Doppler and duplex waveform analysis. FINDINGS: There is color-flow assignment on color-flow Doppler and duplex venous waveforms in the internal jugular, subclavian, axillary, brachial, radial and ulnar veins. The peripheral veins are compressible. IMPRESSION: Negative for sonographic evidence of deep venous thrombosis left upper extremity. ) Tests in the medicine section of CPT®: reviewed and ordered Procedures

## 2019-09-22 NOTE — ED TRIAGE NOTES
Patient reports left hand swelling for about 1 week. Also reports abdominal pain/swelling and lower leg swelling with constipation. Denies n/v/d. Pitting to both lower legs and left arm

## 2023-02-13 NOTE — PROGRESS NOTES
Nutrition Reason for assessment: BPA - EN/PN PTA - false positive result verified with RN and pt BPA - Malnutrition Screening Tool positive for unintended weight loss. Recently Lost Weight Without Trying: No 
If Yes, How Much Weight Loss: >33 lbs Eating Poorly Due to Decreased Appetite: Yes Assessment:  
Admitted with decompensated alcoholic cirrhosis, hyponatremia, hyperammonemia. PMH remarkable for ETOH abuse (sober since July 2018), HCC, esophogeal varices, ileum ulcer, HTN, alcoholic cirrhosis with ascites and frequent paracentesis. Food/Nutrition Patient History:  Visit with pt and wife at bedside. They report following a sodium restricted diet at home with pt referencing 2,000 mg per day. PO intake is variable and poor at times, he drinks boost, ensure, equate at times but prefers to get his \"sodium elsewhere. \"  He uses \"[employer] Low Sodium Salt\" at home when he wants salt would presume this is lite salt which still contains 300mg sodium with increased potassium provision. Baseline wt of 235# July 2081 with wife reporting wt without fluid ~1 month ago of \"180 some. \"  Pt present with physical findings consistent with malnutrition. Pertinent Medications: lasix, lactulose, mag ox DIET CARDIAC Regular; 2 GM NA (House Low NA) Anthropometrics:Height: 5' 8\" (172.7 cm),  Weight: 100.2 kg (221 lb), unspecified  , Body mass index is 33.6 kg/m². BMI class of obesity. BMI and current wt with limited validity secondary ascites. Malnutrition Criteria: ASPEN Malnutrition Criteria Acute Illness, Chronic Illness, or Social/Enviornmental: Chronic illness Body Fat: Mild Muscle Mass: Mild ASPEN Malnutrition Score - Chronic Illness: 2 Chronic Illness - Malnutrition Diagnosis: Moderate malnutrition Macronutrient needs: 82 kg reported wt without fluid EER:  9050-0503 kcal /day (25-30 kcal/kg) EPR:  82-98 grams protein/day (1-1.2 grams/kg) Intake/Comparative Standards: No recorded meal(s): Pt recalls ~35% am tray, 100% protein sources from noon meal. Defer comparison until adequate data available. Nutrition Diagnosis: Predicted inadequate energy itnake related to taste alterations, poor appetite, early satiety as evidenced by self report of barriers, recall of highly variable po at home, +muscle and fat loss in setting of increased needs associated with liver disease. Intervention: 
Meals and snacks: Continue current diet. Nutrition supplement therapy: Ensure High Protein BID - chocolate. Nutrition education: Provided patient and wife with verbal instruction on low sodium diet. Emphasis on sodium free seasoning options, importance of adequate kcal and protein intake. Patient verbalizes good understanding of diet. Expect fair compliance. Coordination of nutrition care: Chani Lomas RN. Discharge Nutrition Plan: Too soon to determine. [address]

## 2023-02-15 NOTE — PROGRESS NOTES
[facility] Progress Note Admit Date:  5/20/2019 Today's Date:  5/23/2019 CC:  Decompensated cirrhosis Subjective:  
 
Patient is feeling better today. He denies any abd pain, but has bloating with the abd distention. He denies any nausea or vomiting. He has not had a BM today, but is passing gas. He denies any bleeding. Medications:  
Current Facility-Administered Medications Medication Dose Route Frequency  albumin human 25% (BUMINATE) solution 12.5 g  12.5 g IntraVENous BID  pantoprazole (PROTONIX) tablet 40 mg  40 mg Oral ACB  
 0.9% sodium chloride infusion 250 mL  250 mL IntraVENous PRN  
 escitalopram oxalate (LEXAPRO) tablet 10 mg  10 mg Oral DAILY  ferrous sulfate 300 mg (60 mg iron)/5 mL oral syrup 27 mg  27 mg Oral ACB  furosemide (LASIX) injection 40 mg  40 mg IntraVENous DAILY  gabapentin (NEURONTIN) capsule 400 mg  400 mg Oral TID  lactulose (CHRONULAC) 10 gram/15 mL solution 15 mL  10 g Oral TID  magnesium oxide (MAG-OX) tablet 400 mg  400 mg Oral DAILY  mirtazapine (REMERON) tablet 15 mg  15 mg Oral QHS  budesonide (PULMICORT) 500 mcg/2 ml nebulizer suspension  500 mcg Nebulization BID RT And  
 albuterol (PROVENTIL VENTOLIN) nebulizer solution 2.5 mg  2.5 mg Nebulization Q6HWA RT  
 sodium chloride (NS) flush 5-40 mL  5-40 mL IntraVENous Q8H  
 sodium chloride (NS) flush 5-40 mL  5-40 mL IntraVENous PRN  
 acetaminophen (TYLENOL) tablet 650 mg  650 mg Oral Q4H PRN  
 ondansetron (ZOFRAN) injection 4 mg  4 mg IntraVENous Q4H PRN  
 ciprofloxacin HCl (CIPRO) tablet 500 mg  500 mg Oral Q24H  
 spironolactone (ALDACTONE) tablet 100 mg  100 mg Oral DAILY Review of Systems: ROS was obtained, with pertinent positives as listed above. No chest pain or SOB. Diet:  Cardiac, 2 gm Na+ diet Objective:  
Vitals: 
Visit Vitals /81 Pulse 94 Temp 97.9 °F (36.6 °C) Resp 18 Ht 5' 8\" (1.727 m) Problem: Discharge Planning  Goal: Discharge to home or other facility with appropriate resources  2/15/2023 1541 by Nara Oneal RN  Outcome: Progressing     Problem: Safety - Adult  Goal: Free from fall injury  2/15/2023 1541 by Nara Oneal RN  Outcome: Progressing     Problem: Respiratory - Adult  Goal: Achieves optimal ventilation and oxygenation  2/15/2023 1541 by Nara Oneal RN  Outcome: Progressing     Problem: Cardiovascular - Adult  Goal: Maintains optimal cardiac output and hemodynamic stability  2/15/2023 1541 by Nara Oneal RN  Outcome: Progressing     Problem: Cardiovascular - Adult  Goal: Absence of cardiac dysrhythmias or at baseline  2/15/2023 1541 by Nara Oneal RN  Outcome: Progressing     Problem: Musculoskeletal - Adult  Goal: Return mobility to safest level of function  Outcome: Progressing     Problem: Metabolic/Fluid and Electrolytes - Adult  Goal: Electrolytes maintained within normal limits  2/15/2023 1541 by Nara Oneal RN  Outcome: Progressing     Problem: Hematologic - Adult  Goal: Maintains hematologic stability  2/15/2023 1541 by Nara Oneal RN  Outcome: Progressing     Problem: Chronic Conditions and Co-morbidities  Goal: Patient's chronic conditions and co-morbidity symptoms are monitored and maintained or improved  2/15/2023 1541 by Nara Oneal RN  Outcome: Progressing     Problem: Neurosensory - Adult  Goal: Achieves maximal functionality and self care  Outcome: Progressing     Problem: Skin/Tissue Integrity - Adult  Goal: Skin integrity remains intact  Outcome: Progressing     Problem: Gastrointestinal - Adult  Goal: Maintains adequate nutritional intake  Outcome: Progressing Wt 100.2 kg (221 lb) SpO2 94% BMI 33.60 kg/m² Intake/Output: 
05/23 0701 - 05/23 1900 In: -  
Out: 1000 [Urine:1000] 05/21 1901 - 05/23 0700 In: 120 [P.O.:120] Out: 3735 [Urine:800] Exam: 
General appearance: alert, cooperative, no distress, lying in bed 
Lungs: coarse BS to auscultation bilaterally anteriorly Heart: regular rate and rhythm Abdomen: soft in areas, distended, ascites noted, non-tender. Bowel sounds normal. No masses, no organomegaly Neuro:  alert and oriented Data Review (Labs):   
Recent Labs  
  05/23/19 
0503 05/22/19 
7047 05/21/19 
1556 05/21/19 
1219 05/21/19 
0910 05/21/19 
0540 05/20/19 
1815 05/20/19 
1713 05/20/19 
1658 WBC 9.4 12.1* 7.1  --  4.1*  --  5.5  --   --   
HGB 7.8* 7.7* 7.9*  --  8.8*  --  9.2*  --   --   
HCT 24.4* 24.2* 26.1*  --  27.6*  --  28.3*  --   --   
 188 202  --  225  --  8*  --   -- MCV 86.2 87.1 88.5  --  86.0  --  85.0  --   --   
 131*  --   --   --  133*  --   --  131*  
K 3.9 4.6  --   --   --  4.4  --   --  4.2  97*  --   --   --  98  --   --  97* CO2 28 24  --   --   --  27  --   --  25 BUN 14 13  --   --   --  10  --   --  10  
CREA 0.99 1.06  --   --   --  1.12  --   --  0.98  
CA 8.2* 8.4  --   --   --  8.5  --   --  8.1*  
MG 2.2 2.3  --   --   --   --   --   --  1.9 * 153*  --   --   --  181*  --   --  112* * 200*  --   --   --  245*  --   --  275* SGOT 49* 37  --   --   --  46*  --   --  56* ALT 22 19  --   --   --  25  --   --  26  
TBILI 0.9 1.0  --  1.4*  --  1.6*  --   --  1.5* CBIL  --   --   --  0.8*  --   --   --   --   --   
ALB 2.5* 2.6*  --   --   --  2.4*  --   --  2.4*  
TP 6.1* 6.5  --   --   --  6.7  --   --  6.8 LPSE  --   --   --   --   --   --   --   --  304 PTP  --   --   --   --   --   --   --  17.5*  --   
INR  --   --   --   --   --   --   --  1.5  --   
APTT  --   --   --   --   --   --   --  38.6  --   
 
 
  Ref.  Range 3/27/2019 09:52  
 Lactic acid Latest Ref Range: 0.4 - 2.0 MMOL/L 1.4 Procalcitonin Latest Units: ng/mL 1.9 Ammonia Latest Ref Range: 11 - 32 UMOL/L 89 (H)  
  
  Ref. Range 3/25/2019 11:08 BODY FLUID TYPE Latest Units:   PARACENTESIS FLUID FLUID APPEARANCE Latest Units:   HAZY FLUID COLOR Latest Units:   YELLOW  
FLUID RBC CT. Latest Units: /cu mm <1,000 FLUID WBC COUNT Latest Units: /cu mm 128 FLD NEUTROPHILS Latest Units: % 4 FLD LYMPHS Latest Units: % 95 FLD MONO/MACROPHAGES Latest Units: % 1  
  
CULTURE, BODY FLUID Fredy Tavera [[id number]] (Order [id number]) Microbiology  
                     
Date: 3/25/2019 Department: [facility] Released By:  (auto-released) Authorizing: ROCIO Rodriguez Specimen Information: Paracentesis Fluid  
     
Component Value Flag Ref Range Units Status Special Requests:         Final  
NO SPECIAL REQUESTS   
GRAM STAIN 0 TO 1 WBC/OIF        Final  
GRAM STAIN         Final  
FEW GRAM POSITIVE COCCI Culture result: Abnormal          Final  
MODERATE STREPTOCOCCUS SALIVARIUS Susceptibility Streptococcus salivarius              
Antibiotic Interpretation Value Method Comment Penicillin G ($$) Intermediate 0.5 ug/mL VENTURA    
Clindamycin ($) Susceptible 0.0625 ug/mL VENTURA    
Vancomycin ($) Susceptible 0.5 ug/mL VENTURA    
Cefepime ($$) Susceptible 0.25 ug/mL VENTURA    
Ceftriaxone ($) Susceptible 0.125 ug/mL VENTURA    
Amoxicillin Intermediate 0.5 ug/mL VENTURA    
  
Ultrasound guided paracentesis. 25 March 2019 History: Cirrhosis, ascites.  
: Eric Washington PA-C  
Supervising Physician: Lola Black M.D.  
Consent:  Informed written and oral consent was obtained from the patient after 
the risks and benefits were discussed.  All questions were answered and the 
patient requested that we proceed.  
Procedure:  Maximal sterile barrier technique was used.  Following routine prep 
and drape of the abdomen, a local field block with 1% lidocaine was achieved. Ultrasound evaluation was performed.   
Fluid was identified in the peritoneal cavity. Using real-time ultrasound 
guidance, the peritoneal cavity was accessed with an 8 Amharic centesis catheter. 
 The catheter was connected to Vacutainer bottles.  
A total of 6400 cc of thin yellow fluid was removed and sent to the lab. The 
catheter was removed and a bandage applied.  
Complications: None.  Findings: Large-volume ascites.  IMPRESSION Impression: Uncomplicated ultrasound guided paracentesis. 
  
CT of the abdomen and pelvis without contrast. 26 March 2019 ADDITIONAL CLINICAL INFORMATION: Cirrhosis with paracentesis yesterday now with 
abdominal distention with nausea and vomiting. Comparison: Abdominal ultrasound dated March 25, 2019.  
Multiple axial images were obtained through the abdomen and pelvis without IV 
contrast.  Radiation dose reduction techniques were used for this study:  All CT 
scans performed at this facility use one or all of the following: Automated 
exposure control, adjustment of the mA and/or kVp according to patient's size, 
iterative reconstruction.  
FINDINGS: 
LOWER THORAX: Moderately sized left-sided pleural effusion with atelectatic 
change of the left lung base. Extensive coronary artery calcifications.  LIVER: Atrophic liver with prominent macronodular contour consistent with 
cirrhosis, slight caudate lobe hypertrophy. Cannot evaluate for hepatic lesions 
with the lack of IV contrast.  
BILIARY SYSTEM: The gallbladder is contracted with slight wall thickening and 
with evidence of cholelithiasis.  
SPLEEN: Borderline splenomegaly.  
PANCREAS: No pancreatic mass. No pancreatic duct dilation.  ADRENALS: No adrenal nodule or adrenal hypertrophy.  
URINARY SYSTEM: Left lower pole exophytic renal cyst measuring 3 cm.  Left 
superior pole exophytic 1.9 cm intermediate density lesion which is incompletely 
characterized.  
 FLUID: Elfreda Mook is a large volume of ascites within the abdomen and pelvis.  
REPRODUCTIVE ORGANS: Unremarkable.  
BOWEL: There is diffuse wall thickening of the ascending and transverse colon 
which may be related to patient's cirrhosis. No evidence of bowel structure. There is diffuse wall thickening of the small bowel.  
VASCULAR/NODES: No aortic or iliac artery aneurysm. No lymphadenopathy. Perigastric and paraesophageal varices are noted.  
BONES/SUBCUTANEOUS TISSUE: Mild bilateral gynecomastia.  IMPRESSION IMPRESSION: 
1. Macronodular cirrhosis with sequela of portal hypertension with mild 
splenomegaly and with perigastric and periesophageal varices evident. Large 
volume of intra-abdominal ascites. 2. Moderately sized left-sided pleural effusion. 3. 1.9 cm intermediate density lesion of the left kidney which is exactly 
characterize recommend follow-up with MRI or renal mass CT. 4. Diffuse wall thickening of the ascending and transverse colon and mild wall 
thickening of the small bowel favored related to fluid status and cirrhosis. 
  
Ultrasound guided paracentesis 13 May 2019 Procedure:  Maximal sterile barrier technique was used.  Following routine prep 
and drape of the abdomen, a local field block with 1% lidocaine was achieved. Ultrasound evaluation was performed. Fluid was identified in the peritoneal cavity. Using real-time ultrasound 
guidance, the peritoneal cavity was accessed with an 8 Citizen of the [place] centesis catheter. 
 The catheter was connected to Vacutainer bottles. A total of 6500 cc of thin yellow fluid was removed. The catheter was removed 
and a bandage applied. Complications: None. Findings: Large-volume ascites. IMPRESSION Impression: Uncomplicated ultrasound guided paracentesis. 
  
Ultrasound guided paracentesis. 22 May 2019  
History: Alcohol-related Cirrhosis, ascites.  
: Yvonne Pryor PA-C  
Supervising Physician: Bebe Macdonald M.D.  Consent:  Informed written and oral consent was obtained from the patient after 
the risks and benefits were discussed.  All questions were answered and the 
patient requested that we proceed.  
Procedure:  Maximal sterile barrier technique was used.  Following routine prep 
and drape of the abdomen, a local field block with 1% lidocaine was achieved. Ultrasound evaluation was performed.   
Fluid was identified in the peritoneal cavity. Using real-time ultrasound 
guidance, the peritoneal cavity was accessed with an 8 Eritrean centesis catheter. 
 The catheter was connected to wall suction.  
A total of 4270 cc of thin yellow fluid was removed. A sample was sent to the 
lab The catheter was removed and a bandage applied.  
Complications: None.  Findings: Large-volume ascites.  IMPRESSION Impression: Uncomplicated ultrasound guided paracentesis. Assessment:  
 
Active Problems: 
  Alcoholic cirrhosis of liver with ascites ([address] (10/8/2018) Esophageal varices without bleeding ([address] (10/8/2018) Essential hypertension (10/30/2018) Hyponatremia (12/28/2018) Pleural effusion, left (1/16/2019) Hyperkalemia (3/26/2019) Decompensated hepatic cirrhosis ([address] (5/20/2019) 68 yo male with PMH of  ETOH abuse (sober since July 2018), HCC (dx 4/2019 no treatment currently), alcoholic cirrhosis, with ascites and frequent paracentesis/esophageal varices/HE, ileum ulcer, HTN, chronic left pleural effusion, chronic cardiac murmurm who was seen in consultation 21 May 2019 at the request of Dr. Garvey for cirrhosis, after admitted with worsening edema and ascites.  He was seen at [facility] (Dr. Betina Calabrese) last week and has liver transplant eval mid June.  Na+ low at 133, plt very low at [facility] ([facility]) - lab error as recheck has been in 200 range, Cr 1.12.  TIPs discussed in the past, but HE during last admission. MELD score 18 on 20 May 2019. S/p paracentesis on 22 May 2019. He is feeling better today. Plan: -Supportive care, IVF. -Continue Lasix 40 mg daily and Aldactone 100 mg daily, as long as renal function tolerates. -Continue Lactulose (has been on Xifaxin but no signs of current HE).  Titrate Lactulose to 3 BMs per day. -On Cipro for SBP prophylaxis. 
-Cardiac, regular, 2 gm Na+. Dietitian discussed diet with pt on 21 May 2019. 
-Therapeutic paracentesis PRN. -Hold off on PleurX catheter as pt is undergoing liver transplant evaluation and is not going on hospice.   
-Follow up with Dr. Diaz Zhang (his primary GI) as outpatient after this admission. 
-Follow up pending with [facility] to discuss transplant in June 2019. Patient is seen and examined in collaboration with Dr. Krysten Bennett. Assessment and plan as per Dr. Antony Morfin. Svetlana Schuler, [facility] unknown

## (undated) DEVICE — BRONCHOSCOPY PACK: Brand: MEDLINE INDUSTRIES, INC.

## (undated) DEVICE — SINGLE USE BIOPSY VALVE MAJ-210: Brand: SINGLE USE BIOPSY VALVE (STERILE)

## (undated) DEVICE — KENDALL RADIOLUCENT FOAM MONITORING ELECTRODE RECTANGULAR SHAPE: Brand: KENDALL

## (undated) DEVICE — CANNULA NSL ORAL AD FOR CAPNOFLEX CO2 O2 AIRLFE

## (undated) DEVICE — SINGLE USE SUCTION VALVE MAJ-209: Brand: SINGLE USE SUCTION VALVE (STERILE)